# Patient Record
Sex: MALE | Race: WHITE | NOT HISPANIC OR LATINO | Employment: OTHER | URBAN - METROPOLITAN AREA
[De-identification: names, ages, dates, MRNs, and addresses within clinical notes are randomized per-mention and may not be internally consistent; named-entity substitution may affect disease eponyms.]

---

## 2017-02-06 ENCOUNTER — TRANSFERRED RECORDS (OUTPATIENT)
Dept: HEALTH INFORMATION MANAGEMENT | Facility: CLINIC | Age: 52
End: 2017-02-06

## 2017-05-03 DIAGNOSIS — E10.319 TYPE 1 DIABETES MELLITUS WITH RETINOPATHY OF BOTH EYES, MACULAR EDEMA PRESENCE UNSPECIFIED, UNSPECIFIED RETINOPATHY SEVERITY (H): ICD-10-CM

## 2017-05-03 LAB — HBA1C MFR BLD: 5.9 % (ref 4.3–6)

## 2017-05-03 PROCEDURE — 83036 HEMOGLOBIN GLYCOSYLATED A1C: CPT | Performed by: INTERNAL MEDICINE

## 2017-05-03 PROCEDURE — 36415 COLL VENOUS BLD VENIPUNCTURE: CPT | Performed by: INTERNAL MEDICINE

## 2017-05-09 ENCOUNTER — OFFICE VISIT (OUTPATIENT)
Dept: FAMILY MEDICINE | Facility: CLINIC | Age: 52
End: 2017-05-09
Payer: COMMERCIAL

## 2017-05-09 VITALS
SYSTOLIC BLOOD PRESSURE: 123 MMHG | TEMPERATURE: 97.6 F | WEIGHT: 165.8 LBS | DIASTOLIC BLOOD PRESSURE: 62 MMHG | BODY MASS INDEX: 23.74 KG/M2 | HEART RATE: 54 BPM | HEIGHT: 70 IN

## 2017-05-09 DIAGNOSIS — Z11.59 NEED FOR HEPATITIS C SCREENING TEST: ICD-10-CM

## 2017-05-09 DIAGNOSIS — E10.319 TYPE 1 DIABETES MELLITUS WITH RETINOPATHY OF BOTH EYES, MACULAR EDEMA PRESENCE UNSPECIFIED, UNSPECIFIED RETINOPATHY SEVERITY (H): ICD-10-CM

## 2017-05-09 DIAGNOSIS — E78.5 HYPERLIPIDEMIA LDL GOAL <100: Chronic | ICD-10-CM

## 2017-05-09 DIAGNOSIS — E10.3293 TYPE 1 DIABETES MELLITUS WITH MILD NONPROLIFERATIVE RETINOPATHY OF BOTH EYES WITHOUT MACULAR EDEMA (H): Primary | Chronic | ICD-10-CM

## 2017-05-09 PROCEDURE — 99214 OFFICE O/P EST MOD 30 MIN: CPT | Performed by: INTERNAL MEDICINE

## 2017-05-09 RX ORDER — INSULIN GLARGINE 100 [IU]/ML
2-5 INJECTION, SOLUTION SUBCUTANEOUS AT BEDTIME
Qty: 10 ML | Refills: 11 | Status: SHIPPED | OUTPATIENT
Start: 2017-05-09 | End: 2017-11-21

## 2017-05-09 NOTE — PROGRESS NOTES
SUBJECTIVE:                                                    Rangel Singh is a 51 year old male who presents to clinic today for the following health issues:        Diabetes Follow-up    Patient is checking blood sugars: four times daily.    Blood sugar testing frequency justification: Risk of hypoglycemia with medication(s)  Results are as follows:         am - 85              postprandial after breakfast - 110         lunchtime - 130         suppertime - 150         bedtime - 130    Diabetic concerns: None     Symptoms of hypoglycemia (low blood sugar): shaky.  Does have hypoglycemia 2-3 x week unawareness. Wears continuous glucose monitor.  Only has symptoms when blood sugar is < 40.  Often has lows with exercise or activity.  Often unaware.  He does have Glucogon.  Carries glucose tablets everywhere.    He reports he is taking 3 units with meals, sometimes less.  He is using lantus 5 units at bedtime.  He prefers insulin vials over pens.  He wants 1 vial at a time.     Paresthesias (numbness or burning in feet) or sores: No     Date of last diabetic eye exam: up to date    He has  glucagon.  He doesn't wear medical alert bracelet.  His co-workers are aware.    He does have bedtime snack consistently.  Overnight  Hypoglycemia is most bothersome.     Hyperlipidemia Follow-Up      Rate your low fat/cholesterol diet?: good    Taking statin?  Yes, no muscle aches from statin    Other lipid medications/supplements?:  none     Hypertension Follow-up      Outpatient blood pressures are being checked at home.  Results are 105/60.    Low Salt Diet: no added salt         Amount of exercise or physical activity: 2-3 days/week for an average of 45-60 minutes    Problems taking medications regularly: No    Medication side effects: none    Diet: low salt    Chief Complaint   Patient presents with     Diabetes     check     Lab Result Notice     go over           Current Outpatient Prescriptions   Medication  "Sig Dispense Refill     insulin lispro (HUMALOG) 100 UNIT/ML VIAL 3-4 units in am, 3 lunch, 4-8 units in pm and sliding scale.  Estimated maximum units daily is 25 units per day. 3 Month 1     lisinopril (PRINIVIL,ZESTRIL) 10 MG tablet Take 1 tablet (10 mg) by mouth daily 1 tab by mouth once daily 90 tablet 3     atorvastatin (LIPITOR) 10 MG tablet Take 1 tablet (10 mg) by mouth every other day 45 tablet 3     insulin glargine (LANTUS) 100 UNIT/ML vial Inject 7 Units Subcutaneous At Bedtime 30 mL 11     ASPIRIN 81 MG OR TABS 1 tab po QD (Once per day) 100 3     insulin syringes, disposable, U-100 0.3 ML MISC 1 Device 4 times daily 100 each 11     RELION ULTRA THIN LANCETS 30G MISC 1 Device 4 times daily 200 each 11     blood glucose monitoring (NO BRAND SPECIFIED) test strip Check BS 4-5x/day.  Dispense 3 month supply 100 each 3     order for DME Equipment being ordered: Continue glucose monitor - Dexcom G4 - , transmitter, sensor 1 each prn     tadalafil (CIALIS) 20 MG tablet Take 1 tablet (20 mg) by mouth daily as needed for erectile dysfunction Never use with nitroglycerin, terazosin or doxazosin. 3 tablet 3     insulin glargine (LANTUS VIAL) 100 UNIT/ML soln 3 units at dinner 1 Month 11     insulin pen needle (ULTICARE SHORT) 31G X 8 MM Use 3-4 daily or as directed. 100 each prn     insulin pen needle 31G X 6 MM Use 1 pen needles daily or as directed. 100 each 6       Reviewed and updated as needed this visit by clinical staff       Reviewed and updated as needed this visit by Provider         ROS:  Constitutional, HEENT, cardiovascular, pulmonary, gi and gu systems are negative, except as otherwise noted.    OBJECTIVE:                                                    /62 (BP Location: Right arm, Patient Position: Chair, Cuff Size: Adult Regular)  Pulse 54  Temp 97.6  F (36.4  C) (Tympanic)  Ht 5' 10.25\" (1.784 m)  Wt 165 lb 12.8 oz (75.2 kg)  BMI 23.62 kg/m2  Body mass index is 23.62 " kg/(m^2).  GENERAL APPEARANCE: healthy, alert and no distress    Diagnostic Test Results:  Results for orders placed or performed in visit on 05/03/17   Hemoglobin A1c   Result Value Ref Range    Hemoglobin A1C 5.9 4.3 - 6.0 %        ASSESSMENT/PLAN:                                                      1. 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 He also feels symptoms when his blood sugars greater than 150. Since he has hypoglycemia unawareness, he would prefer this over feeling crummy blood sugars of 150. 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 Discussed caryring glucagon. He does carry applesauce pouches. Will have patient decrease his insulin by 1-2 units at meals and with the Lantus. He is on minimal amounts of insulin. Recheck in 6 months, nonfasting blood work prior  -  insulin glargine (LANTUS) 100 UNIT/ML injection; Inject 2-5 Units Subcutaneous At Bedtime  Dispense: 10 mL; Refill: 11  - insulin lispro (HUMALOG) 100 UNIT/ML injection; 2-3 units in am, 2-3 lunch, 2-4 units in pm and sliding scale.  Estimated maximum units daily is 25 units per day.  Dispense: 1 vial; Refill: 11  - glucagon (GLUCAGON EMERGENCY) 1 MG kit; Inject 1 mg into the muscle once for 1 dose  Dispense: 1 mg; Refill: 11  - Hemoglobin A1c; Future  - LDL cholesterol direct; Future  - Basic metabolic panel; Future  - **Albumin Random Urine Quant FUTURE 6mo; Future    2. 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 - see above  - insulin glargine (LANTUS) 100 UNIT/ML injection; Inject 2-5 Units Subcutaneous At Bedtime  Dispense: 10 mL; Refill: 11  - insulin lispro (HUMALOG) 100 UNIT/ML injection; 2-3 units in am, 2-3 lunch, 2-4 units in pm and sliding scale.  Estimated maximum units daily is 25 units  per day.  Dispense: 1 vial; Refill: 11  - glucagon (GLUCAGON EMERGENCY) 1 MG kit; Inject 1 mg into the muscle once for 1 dose  Dispense: 1 mg; Refill: 11    3. Hyperlipidemia LDL goal <100 - patient on low intensity statin due to fairly low cholesterol numbers off statin    4. Need for hepatitis C screening test  - Hepatitis C antibody; Future    1. Consider wearing your medical alert ID all the time  2. Consider filling a few Glucagon, having kit at work, in lunchbox, at home, etc.  3. Decrease insulin by 1-2 units.  4. See Dr. Ji in 6 months, sooner if needed.  Get non-fasting blood work prior  5. It is recommend that people born between 1945 and 1965 be screened one time for Hepatitis C with a blood test. Order was placed.      Ana Laura Ji, DO  [facility]

## 2017-05-09 NOTE — PATIENT INSTRUCTIONS
1. Consider wearing your medical alert ID all the time  2. Consider filling a few Glucagon, having kit at work, in lunchbox, at home, etc.  3. Decrease insulin by 1-2 units.  4. See Dr. Ji in 6 months, sooner if needed.  Get non-fasting blood work prior  5. It is recommend that people born between 1945 and 1965 be screened one time for Hepatitis C with a blood test. Order was placed.

## 2017-05-09 NOTE — NURSING NOTE
"Chief Complaint   Patient presents with     Diabetes     check     Lab Result Notice     go over       Initial /62 (BP Location: Right arm, Patient Position: Chair, Cuff Size: Adult Regular)  Pulse 54  Temp 97.6  F (36.4  C) (Tympanic)  Ht 5' 10.25\" (1.784 m)  Wt 165 lb 12.8 oz (75.2 kg)  BMI 23.62 kg/m2 Estimated body mass index is 23.62 kg/(m^2) as calculated from the following:    Height as of this encounter: 5' 10.25\" (1.784 m).    Weight as of this encounter: 165 lb 12.8 oz (75.2 kg).  Medication Reconciliation: complete  "

## 2017-05-09 NOTE — MR AVS SNAPSHOT
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 Consider wearing your medical alert ID all the time  2. Consider filling a few Glucagon, having kit at work, in lunchbox, at home, etc.  3. Decrease insulin by 1-2 units.  4. See Dr. Ji in 6 months, sooner if needed.  Get non-fasting blood work prior  5. It is recommend that people born between 1945 and 1965 be screened one time for Hepatitis C with a blood test. Order was placed.          Follow-ups after your visit        Future tests that were ordered for you today     Open Future Orders        Priority Expected Expires Ordered    Hepatitis C antibody Routine  5/9/2018 5/9/2017    Hemoglobin A1c Routine 11/7/2017 2/7/2018 5/9/2017    LDL cholesterol direct Routine  5/9/2018 5/9/2017    Basic metabolic panel Routine 11/7/2017 2/7/2018 5/9/2017    **Albumin Random Urine Quant FUTURE 6mo Routine 11/5/2017 12/5/2017 5/9/2017            Who to contact     If you have questions or need follow up information about today's clinic visit or your schedule please contact [facility] directly at [phone].  Normal or non-critical lab and imaging results will be communicated to you by MyChart, letter or phone within 4 business days after the clinic has received the results. If you do not hear from us within 7 days, please contact the clinic through [employer] or  "phone. If you have a critical or abnormal lab result, we will notify you by phone as soon as possible.  Submit refill requests through "[employer]" or call your pharmacy and they will forward the refill request to us. Please allow 3 business days for your refill to be completed.          Additional Information About Your Visit        MOD[employer]art Information     "[employer] gives you secure access to your electronic health record. If you see a primary care provider, you can also send messages to your care team and make appointments. If you have questions, please call your primary care clinic.  If you do not have a primary care provider, please call [phone] and they will assist you.        Care EveryWhere ID     This is your Care EveryWhere ID. This could be used by other organizations to access your [place] medical records  [id number]        Your Vitals Were     Pulse Temperature Height BMI (Body Mass Index)          54 97.6  F (36.4  C) (Tympanic) 5' 10.25\" (1.784 m) 23.62 kg/m2         Blood Pressure from Last 3 Encounters:   05/09/17 123/62   11/10/16 122/67   11/01/16 123/70    Weight from Last 3 Encounters:   05/09/17 165 lb 12.8 oz (75.2 kg)   11/10/16 162 lb (73.5 kg)   11/01/16 162 lb (73.5 kg)                 Today's Medication Changes          These changes are accurate as of: 5/9/17  3:54 PM.  If you have any questions, ask your nurse or doctor.               Start taking these medicines.        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 "        Dose/Directions    insulin glargine 100 UNIT/ML injection   Commonly known as:  LANTUS   This may have changed:    - how much to take  - Another medication with the same name was removed. Continue taking this medication, and follow the directions you see here.   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  Estimated maximum units daily is 25 units per day.   Quantity:  1 vial   Refills:  11            Where to get your medicines      These medications were sent to [facility] [address] [place] [address]  [address] [facility] [address]     Phone:  [phone]     glucagon 1 MG kit    insulin glargine 100 UNIT/ML injection    insulin lispro 100 UNIT/ML injection                Primary Care Provider Office Phone # Fax #    Ana Laura Ji [phone]       [facility] [address] [facility] [address]        Thank you!     Thank you for choosing [facility]  for your care. Our goal is always to provide you with excellent care. Hearing back from our patients is one way we can continue to improve our services. Please take a few  minutes to complete the written survey that you may receive in the mail after your visit with us. Thank you!             Your Updated Medication List - Protect others around you: Learn how to safely use, store and throw away your medicines at www.disposemymeds.org.          This list is accurate as of: 5/9/17  3:54 PM.  Always use your most recent med list.                   Brand Name Dispense Instructions for use    aspirin 81 MG tablet     100    1 tab po QD (Once per day)       atorvastatin 10 MG tablet    LIPITOR    45 tablet    Take 1 tablet (10 mg) by mouth every other day       blood glucose monitoring test strip    no brand specified    100 each    Check BS 4-5x/day.  Dispense 3 month supply       glucagon 1 MG kit    GLUCAGON EMERGENCY    1 mg    Inject 1 mg into the muscle once for 1 dose       insulin glargine 100 UNIT/ML injection    LANTUS    10 mL    Inject 2-5 Units Subcutaneous At Bedtime       insulin lispro 100 UNIT/ML injection    humaLOG    1 vial    2-3 units in am, 2-3 lunch, 2-4 units in pm and sliding scale.  Estimated maximum units daily is 25 units per day.       * insulin pen needle 31G X 6 MM     100 each    Use 1 pen needles daily or as directed.       * insulin pen needle 31G X 8 MM    ULTICARE SHORT    100 each    Use 3-4 daily or as directed.       insulin syringes (disposable) U-100 0.3 ML Misc     100 each    1 Device 4 times daily       lisinopril 10 MG tablet    PRINIVIL/ZESTRIL    90 tablet    Take 1 tablet (10 mg) by mouth daily 1 tab by mouth once daily       order for DME     1 each    Equipment being ordered: Continue glucose monitor - Dexcom G4 - , transmitter, sensor       RELION ULTRA THIN LANCETS 30G Misc     200 each    1 Device 4 times daily       tadalafil 20 MG tablet    CIALIS    3 tablet    Take 1 tablet (20 mg) by mouth daily as needed for erectile dysfunction Never use with nitroglycerin, terazosin or doxazosin.       * Notice:  This list has 2  medication(s) that are the same as other medications prescribed for you. Read the directions carefully, and ask your doctor or other care provider to review them with you.

## 2017-11-18 ENCOUNTER — TELEPHONE (OUTPATIENT)
Dept: FAMILY MEDICINE | Facility: CLINIC | Age: 52
End: 2017-11-18

## 2017-11-18 DIAGNOSIS — E10.3293 TYPE 1 DIABETES MELLITUS WITH MILD NONPROLIFERATIVE RETINOPATHY OF BOTH EYES WITHOUT MACULAR EDEMA (H): Chronic | ICD-10-CM

## 2017-11-18 DIAGNOSIS — Z11.59 NEED FOR HEPATITIS C SCREENING TEST: ICD-10-CM

## 2017-11-18 DIAGNOSIS — E78.5 HYPERLIPIDEMIA LDL GOAL <100: Primary | Chronic | ICD-10-CM

## 2017-11-18 DIAGNOSIS — E78.5 HYPERLIPIDEMIA LDL GOAL <100: Chronic | ICD-10-CM

## 2017-11-18 LAB
ANION GAP SERPL CALCULATED.3IONS-SCNC: 4 MMOL/L (ref 3–14)
BUN SERPL-MCNC: 25 MG/DL (ref 7–30)
CALCIUM SERPL-MCNC: 8.6 MG/DL (ref 8.5–10.1)
CHLORIDE SERPL-SCNC: 107 MMOL/L (ref 94–109)
CO2 SERPL-SCNC: 29 MMOL/L (ref 20–32)
CREAT SERPL-MCNC: 0.76 MG/DL (ref 0.66–1.25)
CREAT UR-MCNC: 154 MG/DL
GFR SERPL CREATININE-BSD FRML MDRD: >90 ML/MIN/1.7M2
GLUCOSE SERPL-MCNC: 106 MG/DL (ref 70–99)
HBA1C MFR BLD: 6.3 % (ref 4.3–6)
LDLC SERPL DIRECT ASSAY-MCNC: 96 MG/DL
MICROALBUMIN UR-MCNC: 17 MG/L
MICROALBUMIN/CREAT UR: 10.78 MG/G CR (ref 0–17)
POTASSIUM SERPL-SCNC: 5.1 MMOL/L (ref 3.4–5.3)
SODIUM SERPL-SCNC: 140 MMOL/L (ref 133–144)

## 2017-11-18 PROCEDURE — 83721 ASSAY OF BLOOD LIPOPROTEIN: CPT | Performed by: INTERNAL MEDICINE

## 2017-11-18 PROCEDURE — 36415 COLL VENOUS BLD VENIPUNCTURE: CPT | Performed by: INTERNAL MEDICINE

## 2017-11-18 PROCEDURE — 80048 BASIC METABOLIC PNL TOTAL CA: CPT | Performed by: INTERNAL MEDICINE

## 2017-11-18 PROCEDURE — 82043 UR ALBUMIN QUANTITATIVE: CPT | Performed by: INTERNAL MEDICINE

## 2017-11-18 PROCEDURE — 86803 HEPATITIS C AB TEST: CPT | Performed by: INTERNAL MEDICINE

## 2017-11-18 PROCEDURE — 83036 HEMOGLOBIN GLYCOSYLATED A1C: CPT | Performed by: INTERNAL MEDICINE

## 2017-11-18 PROCEDURE — 80061 LIPID PANEL: CPT | Performed by: INTERNAL MEDICINE

## 2017-11-18 NOTE — TELEPHONE ENCOUNTER
Rangel would like the whole BLIPR done with his labs today. Not just the LDLDIR.   Please place order for him in [facility]. Thanks  Hellen

## 2017-11-20 LAB
CHOLEST SERPL-MCNC: 146 MG/DL
HCV AB SERPL QL IA: NONREACTIVE
HDLC SERPL-MCNC: 60 MG/DL
LDLC SERPL CALC-MCNC: 80 MG/DL
NONHDLC SERPL-MCNC: 86 MG/DL
TRIGL SERPL-MCNC: 29 MG/DL

## 2017-11-20 NOTE — TELEPHONE ENCOUNTER
Pt is asking for lipid panel instead of LDL only.  I have cued this up since pt was already drawn and processed on 11/18/17.  I called the lab and lipid panel can still be processed.    Order placed per protocol.  Vibha Jaquez RN    Recent Labs   Lab Test  11/18/17   0657  10/22/16   0723  04/22/16   0811  10/24/15   0721  10/11/14   0716   CHOL   --   156  234*  150  153   HDL   --   60  72  58  62   LDL  96  91  154*  86  87   TRIG   --   24  38  29  22   CHOLHDLRATIO   --    --    --   2.6  2.5

## 2017-11-21 ENCOUNTER — OFFICE VISIT (OUTPATIENT)
Dept: FAMILY MEDICINE | Facility: CLINIC | Age: 52
End: 2017-11-21
Payer: COMMERCIAL

## 2017-11-21 VITALS
HEIGHT: 70 IN | DIASTOLIC BLOOD PRESSURE: 66 MMHG | TEMPERATURE: 97.6 F | SYSTOLIC BLOOD PRESSURE: 103 MMHG | HEART RATE: 64 BPM | WEIGHT: 161.6 LBS | BODY MASS INDEX: 23.13 KG/M2

## 2017-11-21 DIAGNOSIS — I10 ESSENTIAL HYPERTENSION: ICD-10-CM

## 2017-11-21 DIAGNOSIS — E78.5 HYPERLIPIDEMIA LDL GOAL <100: Chronic | ICD-10-CM

## 2017-11-21 DIAGNOSIS — Z00.00 ENCOUNTER FOR GENERAL ADULT MEDICAL EXAMINATION WITHOUT ABNORMAL FINDINGS: Primary | ICD-10-CM

## 2017-11-21 DIAGNOSIS — E10.319 TYPE 1 DIABETES MELLITUS WITH RETINOPATHY OF BOTH EYES, MACULAR EDEMA PRESENCE UNSPECIFIED, UNSPECIFIED RETINOPATHY SEVERITY (H): ICD-10-CM

## 2017-11-21 PROBLEM — E10.649 TYPE 1 DIABETES MELLITUS WITH HYPOGLYCEMIA AND WITHOUT COMA (H): Status: ACTIVE | Noted: 2017-11-21

## 2017-11-21 PROCEDURE — 99396 PREV VISIT EST AGE 40-64: CPT | Performed by: INTERNAL MEDICINE

## 2017-11-21 RX ORDER — INSULIN GLARGINE 100 [IU]/ML
2-5 INJECTION, SOLUTION SUBCUTANEOUS AT BEDTIME
Qty: 10 ML | Refills: 11 | Status: SHIPPED | OUTPATIENT
Start: 2017-11-21 | End: 2018-12-22

## 2017-11-21 RX ORDER — ATORVASTATIN CALCIUM 10 MG/1
10 TABLET, FILM COATED ORAL EVERY OTHER DAY
Qty: 45 TABLET | Refills: 3 | Status: SHIPPED | OUTPATIENT
Start: 2017-11-21 | End: 2019-01-08

## 2017-11-21 RX ORDER — LISINOPRIL 10 MG/1
10 TABLET ORAL DAILY
Qty: 90 TABLET | Refills: 3 | Status: SHIPPED | OUTPATIENT
Start: 2017-11-21 | End: 2019-01-08

## 2017-11-21 NOTE — PROGRESS NOTES
SUBJECTIVE:   CC: Rangel Singh is an 52 year old male who presents for preventative health visit.   Chief Complaint   Patient presents with     Physical     follow up,      Diabetes     follow up diabetes/cholesterol , renew meds- orders pended, due for foot exam      Health Maintenance     Declined Flu shot        Healthy Habits:    Do you get at least three servings of calcium containing foods daily (dairy, green leafy vegetables, etc.)? yes    Amount of exercise or daily activities, outside of work: 6 day(s) per week    Problems taking medications regularly No    Medication side effects: No    Have you had an eye exam in the past two years? yes    Do you see a dentist twice per year? Yes    Do you have sleep apnea, excessive snoring or daytime drowsiness?yes, daytime drowsiness    Diabetes Follow-up    Patient is checking blood sugars: four times daily.    Blood sugar testing frequency justification: Risk of hypoglycemia with medication(s)  Results are as follows:       am -        lunchtime - 70-80            postprandial after supper- 120       bedtime - 100     Diabetic concerns: other - one month ago was 150- over 200 for 2 weeks. Better now      Symptoms of hypoglycemia (low blood sugar): none     Paresthesias (numbness or burning in feet) or sores: Yes Numbness  ( had this in June) seen Ortho for this, has inserts, they do help, but if he does run his big toes get numb.  Worsened with excessive running.  Ortho thought more mechanical than diabetes neuropathy.     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  Occurs 5-6 x in last 6 months.  He has glucagon. He doesn't wear medical alert bracelet.  His co-workers are aware    He does have bedtime snack consistently    Humalog 2-3 in AM and Lantus 4 units HS.  Not running, so using higher amounts.     He prefers vials over insulin pens    He has DEXCOM due to hypoglycemia unawareness    Hyperlipidemia  Follow-Up      Rate your low fat/cholesterol diet?: good    Taking statin?  Yes, muscle aches after starting statin    Other lipid medications/supplements?:  none    Hypertension Follow-up      Outpatient blood pressures are being checked at home.  Results are 100/70-75.    Low Salt Diet: not monitoring salt    Episode of shortness of breath:  While completing half marathon, felt 'gurgling' in throat, like he had to cough, but nothing would come out.  Almost sought medical attention during race, but was able to complete.  He has since completed multiple other long runs w/out similar symptoms.    Urinary Frequency:  Occurred 2-3 weeks ago.  Was urinating every 1-2 hours, large amounts.  Starting lifting weights and symptoms since resolved.  No dysuria, hematuria.  Occurred around time when he had hyperglycemia in 150s.      Today's PHQ-2 Score:   PHQ-2 ( 1999 Pfizer) 11/21/2017 5/9/2017   Q1: Little interest or pleasure in doing things 0 0   Q2: Feeling down, depressed or hopeless 0 0   PHQ-2 Score 0 0       Abuse: Current or Past(Physical, Sexual or Emotional)- No  Do you feel safe in your environment - Yes    Social History   Substance Use Topics     Smoking status: Never Smoker     Smokeless tobacco: Never Used     Alcohol use Yes      Comment: occ. 2-3 times a month     The patient does not drink >3 drinks per day nor >7 drinks per week.    Last PSA: No results found for: PSA    Reviewed orders with patient. Reviewed health maintenance and updated orders accordingly - Yes  Current Outpatient Prescriptions   Medication Sig Dispense Refill     insulin glargine (LANTUS) 100 UNIT/ML injection Inject 2-5 Units Subcutaneous At Bedtime 10 mL 11     insulin lispro (HUMALOG) 100 UNIT/ML injection 2-3 units in am, 2-3 lunch, 2-4 units in pm and sliding scale.  Estimated maximum units daily is 25 units per day. 1 vial 11     insulin syringes, disposable, U-100 0.3 ML MISC 1 Device 4 times daily 100 each 11     RELION ULTRA  "THIN LANCETS 30G MISC 1 Device 4 times daily 200 each 11     blood glucose monitoring (NO BRAND SPECIFIED) test strip Check BS 4-5x/day.  Dispense 3 month supply 100 each 3     lisinopril (PRINIVIL,ZESTRIL) 10 MG tablet Take 1 tablet (10 mg) by mouth daily 1 tab by mouth once daily 90 tablet 3     atorvastatin (LIPITOR) 10 MG tablet Take 1 tablet (10 mg) by mouth every other day 45 tablet 3     order for DME Equipment being ordered: Continue glucose monitor - Dexcom G4 - , transmitter, sensor 1 each prn     ASPIRIN 81 MG OR TABS 1 tab po QD (Once per day) 100 3     glucagon (GLUCAGON EMERGENCY) 1 MG kit Inject 1 mg into the muscle once for 1 dose 1 mg 11       Reviewed and updated as needed this visit by clinical staff  Tobacco  Allergies  Meds  Problems  Med Hx  Surg Hx  Fam Hx  Soc Hx          Reviewed and updated as needed this visit by Provider  Allergies  Meds  Problems          ROS: negative except as noted in HPI  C: NEGATIVE for fever, chills, change in weight  I: NEGATIVE for worrisome rashes, moles or lesions  E: NEGATIVE for vision changes or irritation  ENT: NEGATIVE for ear, mouth and throat problems  R: NEGATIVE for significant cough or SOB  CV: NEGATIVE for chest pain, palpitations or peripheral edema  GI: NEGATIVE for nausea, abdominal pain, heartburn, or change in bowel habits   male: negative for dysuria, hematuria, decreased urinary stream, erectile dysfunction, urethral discharge  M: NEGATIVE for significant arthralgias or myalgia  N: NEGATIVE for weakness, dizziness or paresthesias  P: NEGATIVE for changes in mood or affect    OBJECTIVE:   /66 (BP Location: Left arm, Patient Position: Chair, Cuff Size: Adult Regular)  Pulse 64  Temp 97.6  F (36.4  C) (Tympanic)  Ht 5' 10.25\" (1.784 m)  Wt 161 lb 9.6 oz (73.3 kg)  BMI 23.02 kg/m2  EXAM:  GENERAL: healthy, alert and no distress  EYES: Eyes grossly normal to inspection, PERRL and conjunctivae and sclerae normal  HENT: " "ear canals and TM's normal, nose and mouth without ulcers or lesions  NECK: no adenopathy, no asymmetry, masses, or scars and thyroid normal to palpation  RESP: lungs clear to auscultation - no rales, rhonchi or wheezes  CV: regular rate and rhythm, normal S1 S2, no S3 or S4, no murmur, click or rub, no peripheral edema and peripheral pulses strong  ABDOMEN: soft, nontender, no hepatosplenomegaly, no masses and bowel sounds normal  MS: no gross musculoskeletal defects noted, no edema  SKIN: no suspicious lesions or rashes  NEURO: Normal strength and tone, mentation intact and speech normal  PSYCH: mentation appears normal, affect normal/bright  Diabetic foot exam: normal DP and PT pulses, no trophic changes or ulcerative lesions and normal sensory exam    ASSESSMENT/PLAN:       ICD-10-CM    1. Encounter for general adult medical examination without abnormal findings Z00.00    2. 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 Hyperlipidemia LDL goal <100 E78.5 atorvastatin (LIPITOR) 10 MG tablet   4. Essential hypertension I10        COUNSELING:  Reviewed preventive health counseling, as reflected in patient instructions           reports that he has never smoked. He has never used smokeless tobacco.      Estimated body mass index is 23.02 kg/(m^2) as calculated from the following:    Height as of this encounter: 5' 10.25\" (1.784 m).    Weight as of this encounter: 161 lb 9.6 oz (73.3 kg).         Counseling Resources:  ATP IV Guidelines  Pooled Cohorts Equation " Calculator  FRAX Risk Assessment  ICSI Preventive Guidelines  Dietary Guidelines for Americans, 2010  USDA's MyPlate  ASA Prophylaxis  Lung CA Screening    Ana Laura Ji DO  [facility]

## 2017-11-21 NOTE — PATIENT INSTRUCTIONS
Thank you for choosing [facility].  You may be receiving a survey in the mail from Beck Cota regarding your visit today.  Please take a few minutes to complete and return the survey to let us know how we are doing.      If you have questions or concerns, please contact us via [employer] or you can contact your care team at [phone].    Our Clinic hours are:  Monday 6:40 am  to 7:00 pm  Tuesday -Friday 6:40 am to 5:00 pm    The [place] outpatient lab hours are:  Monday - Friday 6:10 am to 4:45 pm  Saturdays 7:00 am to 11:00 am  Appointments are required, call [phone]    If you have clinical questions after hours or would like to schedule an appointment,  call the clinic at [phone].    Preventive Health Recommendations  Male Ages 50   64    Yearly exam:             See your health care provider every year in order to  o   Review health changes.   o   Discuss preventive care.    o   Review your medicines if your doctor has prescribed any.     Have a cholesterol test every 5 years, or more frequently if you are at risk for high cholesterol/heart disease.     Have a diabetes test (fasting glucose) every three years. If you are at risk for diabetes, you should have this test more often.     Have a colonoscopy at age 50, or have a yearly FIT test (stool test). These exams will check for colon cancer.      Talk with your health care provider about whether or not a prostate cancer screening test (PSA) is right for you.    You should be tested each year for STDs (sexually transmitted diseases), if you re at risk.     Shots: Get a flu shot each year. Get a tetanus shot every 10 years.     Nutrition:    Eat at least 5 servings of fruits and vegetables daily.     Eat whole-grain bread, whole-wheat pasta and brown rice instead of white grains and rice.     Talk to your provider about Calcium and Vitamin D.     Lifestyle    Exercise for at least 150 minutes a week (30 minutes a day, 5 days a week). This  will help you control your weight and prevent disease.     Limit alcohol to one drink per day.     No smoking.     Wear sunscreen to prevent skin cancer.     See your dentist every six months for an exam and cleaning.     See your eye doctor every 1 to 2 years.

## 2017-11-21 NOTE — MR AVS SNAPSHOT
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  You may be receiving a survey in the mail from Bcek Cota regarding your visit today.  Please take a few minutes to complete and return the survey to let us know how we are doing.      If you have questions or concerns, please contact us via [employer] or you can contact your care team at [phone].    Our Clinic hours are:  Monday 6:40 am  to 7:00 pm  Tuesday -Friday 6:40 am to 5:00 pm    The [place] outpatient lab hours are:  Monday - Friday 6:10 am to 4:45 pm  Saturdays 7:00 am to 11:00 am  Appointments are required, call [phone]    If you have clinical questions after hours or would like to schedule an appointment,  call the clinic at [phone].    Preventive Health Recommendations  Male Ages 50 - 64    Yearly exam:             See your health care provider every year in order to  o   Review health changes.   o   Discuss preventive care.    o   Review your medicines if your doctor has prescribed any.     Have a cholesterol test every 5 years, or more frequently if you are at risk for high cholesterol/heart disease.     Have a diabetes test (fasting glucose) every three years. If you are at risk for diabetes, you should have this test more often.     Have a colonoscopy at age 50, or have a yearly FIT test (stool test). These exams will check for  colon cancer.      Talk with your health care provider about whether or not a prostate cancer screening test (PSA) is right for you.    You should be tested each year for STDs (sexually transmitted diseases), if you re at risk.     Shots: Get a flu shot each year. Get a tetanus shot every 10 years.     Nutrition:    Eat at least 5 servings of fruits and vegetables daily.     Eat whole-grain bread, whole-wheat pasta and brown rice instead of white grains and rice.     Talk to your provider about Calcium and Vitamin D.     Lifestyle    Exercise for at least 150 minutes a week (30 minutes a day, 5 days a week). This will help you control your weight and prevent disease.     Limit alcohol to one drink per day.     No smoking.     Wear sunscreen to prevent skin cancer.     See your dentist every six months for an exam and cleaning.     See your eye doctor every 1 to 2 years.            Follow-ups after your visit        Future tests that were ordered for you today     Open Future Orders        Priority Expected Expires Ordered    Hemoglobin A1c Routine 5/22/2018 8/22/2018 11/21/2017            Who to contact     If you have questions or need follow up information about today's clinic visit or your schedule please contact [facility] directly at [phone].  Normal or non-critical lab and imaging results will be communicated to you by MyChart, letter or phone within 4 business days after the clinic has received the results. If you do not hear from us within 7 days, please contact the clinic through [employer] or phone. If you have a critical or abnormal lab result, we will notify you by phone as soon as possible.  Submit refill requests through [employer] or call your pharmacy and they will forward the refill request to us. Please allow 3 business days for your refill to be completed.          Additional Information About Your Visit        Mobile Pulse Information     [employer] gives you secure access to your electronic  "health record. If you see a primary care provider, you can also send messages to your care team and make appointments. If you have questions, please call your primary care clinic.  If you do not have a primary care provider, please call [phone] and they will assist you.        Care EveryWhere ID     This is your Care EveryWhere ID. This could be used by other organizations to access your [place] medical records  [id number]        Your Vitals Were     Pulse Temperature Height BMI (Body Mass Index)          64 97.6  F (36.4  C) (Tympanic) 5' 10.25\" (1.784 m) 23.02 kg/m2         Blood Pressure from Last 3 Encounters:   11/21/17 103/66   05/09/17 123/62   11/10/16 122/67    Weight from Last 3 Encounters:   11/21/17 161 lb 9.6 oz (73.3 kg)   05/09/17 165 lb 12.8 oz (75.2 kg)   11/10/16 162 lb (73.5 kg)                 Today's Medication Changes          These changes are accurate as of: 11/21/17  8:10 AM.  If you have any questions, ask your nurse or doctor.               These medicines have changed or have updated prescriptions.        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   Changed by:  Ana Laura Ji DO        Dose:  10 mg   Take 1 tablet (10 mg) by mouth daily   Quantity:  90 tablet   Refills:  3            Where to get your medicines      These medications were sent to [facility] [address] [place] [address]  [address] [facility] [address]     Phone:  [phone]     atorvastatin 10 MG tablet    blood glucose monitoring test strip    glucagon 1 MG kit    insulin glargine 100 UNIT/ML injection    insulin lispro 100 UNIT/ML injection    insulin syringes (disposable) U-100 0.3 ML Misc    lisinopril 10 MG tablet                Primary " Care Provider Office Phone # Fax #    Ana Laura Ji [phone] [address] [facility] [address]        Equal Access to Services     LITZY KERNS : Hadii aad ku hadmarianao Soyonathanali, waaxda luqadaha, qaybta kaalmada adeegbellda, cyndie meek laOliviagabby keys. So [facility] [phone].    ATENCIÓN: Si habla español, tiene a rosario disposición servicios gratuitos de asistencia lingüística. Llame al [phone].    We comply with applicable federal civil rights laws and [place] laws. We do not discriminate on the basis of race, color, national origin, age, disability, sex, sexual orientation, or gender identity.            Thank you!     Thank you for choosing [facility]  for your care. Our goal is always to provide you with excellent care. Hearing back from our patients is one way we can continue to improve our services. Please take a few minutes to complete the written survey that you may receive in the mail after your visit with us. Thank you!             Your Updated Medication List - Protect others around you: Learn how to safely use, store and throw away your medicines at www.disposemymeds.org.          This list is accurate as of: 11/21/17  8:10 AM.  Always use your most recent med list.                   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  Estimated maximum units daily is 25 units per day.    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

## 2017-11-21 NOTE — NURSING NOTE
"Chief Complaint   Patient presents with     Physical     follow up,      Diabetes     follow up diabetes/cholesterol , renew meds- orders pended, due for foot exam      Health Maintenance     Declined Flu shot        Initial /66 (BP Location: Left arm, Patient Position: Chair, Cuff Size: Adult Regular)  Pulse 64  Temp 97.6  F (36.4  C) (Tympanic)  Ht 5' 10.25\" (1.784 m)  Wt 161 lb 9.6 oz (73.3 kg)  BMI 23.02 kg/m2 Estimated body mass index is 23.02 kg/(m^2) as calculated from the following:    Height as of this encounter: 5' 10.25\" (1.784 m).    Weight as of this encounter: 161 lb 9.6 oz (73.3 kg).  Medication Reconciliation: complete    "

## 2017-12-18 DIAGNOSIS — E10.319 TYPE 1 DIABETES MELLITUS WITH RETINOPATHY OF BOTH EYES, MACULAR EDEMA PRESENCE UNSPECIFIED, UNSPECIFIED RETINOPATHY SEVERITY (H): ICD-10-CM

## 2017-12-27 RX ORDER — GLUCOSAM/CHON-MSM1/C/MANG/BOSW 500-416.6
TABLET ORAL
Qty: 300 EACH | Refills: 3 | Status: SHIPPED | OUTPATIENT
Start: 2017-12-27 | End: 2018-12-28

## 2018-01-25 DIAGNOSIS — E78.5 HYPERLIPIDEMIA LDL GOAL <100: Chronic | ICD-10-CM

## 2018-01-25 DIAGNOSIS — E10.319 TYPE 1 DIABETES MELLITUS WITH RETINOPATHY OF BOTH EYES, MACULAR EDEMA PRESENCE UNSPECIFIED, UNSPECIFIED RETINOPATHY SEVERITY (H): ICD-10-CM

## 2018-01-26 NOTE — TELEPHONE ENCOUNTER
"Requested Prescriptions   Pending Prescriptions Disp Refills     atorvastatin (LIPITOR) 10 MG tablet [Pharmacy Med Name: ATORVASTATIN 10MG   TAB]  Last Written Prescription Date:  11/21/2017  Last Fill Quantity: 45,  # refills: 3   Last Office Visit with [facility] provider:  11/21/2017   Future Office Visit:      45 tablet 3     Sig: TAKE ONE TABLET BY MOUTH EVERY OTHER DAY    Statins Protocol Passed    1/25/2018  3:58 PM       Passed - LDL on file in past 12 months    Recent Labs   Lab Test  11/18/17   0657   LDL  80  96            Passed - No abnormal creatine kinase in past 12 months    No lab results found.         Passed - Recent or future visit with authorizing provider    Patient had office visit in the last year or has a visit in the next 30 days with authorizing provider.  See \"Patient Info\" tab in inbasket, or \"Choose Columns\" in Meds & Orders section of the refill encounter.            Passed - Patient is age 18 or older        Santos SAGASTUME (R)    "

## 2018-01-31 RX ORDER — ATORVASTATIN CALCIUM 10 MG/1
TABLET, FILM COATED ORAL
Qty: 45 TABLET | Refills: 3 | OUTPATIENT
Start: 2018-01-31

## 2018-02-26 ENCOUNTER — OFFICE VISIT (OUTPATIENT)
Dept: FAMILY MEDICINE | Facility: CLINIC | Age: 53
End: 2018-02-26
Payer: COMMERCIAL

## 2018-02-26 VITALS
WEIGHT: 169 LBS | OXYGEN SATURATION: 99 % | SYSTOLIC BLOOD PRESSURE: 112 MMHG | DIASTOLIC BLOOD PRESSURE: 68 MMHG | BODY MASS INDEX: 24.08 KG/M2 | HEART RATE: 63 BPM | TEMPERATURE: 98.2 F

## 2018-02-26 DIAGNOSIS — J06.9 UPPER RESPIRATORY TRACT INFECTION, UNSPECIFIED TYPE: ICD-10-CM

## 2018-02-26 DIAGNOSIS — R68.89 FLU-LIKE SYMPTOMS: Primary | ICD-10-CM

## 2018-02-26 DIAGNOSIS — E10.3293 TYPE 1 DIABETES MELLITUS WITH MILD NONPROLIFERATIVE RETINOPATHY OF BOTH EYES WITHOUT MACULAR EDEMA (H): Chronic | ICD-10-CM

## 2018-02-26 LAB
FLUAV+FLUBV AG SPEC QL: NEGATIVE
FLUAV+FLUBV AG SPEC QL: NEGATIVE
SPECIMEN SOURCE: NORMAL

## 2018-02-26 PROCEDURE — 99214 OFFICE O/P EST MOD 30 MIN: CPT | Performed by: NURSE PRACTITIONER

## 2018-02-26 PROCEDURE — 87804 INFLUENZA ASSAY W/OPTIC: CPT | Performed by: NURSE PRACTITIONER

## 2018-02-26 NOTE — PROGRESS NOTES
SUBJECTIVE:   Rangel Singh is a 52 year old male who presents to clinic today for the following health issues:      ENT Symptoms             Symptoms: cc Present Absent Comment   Fever/Chills  x  chills   Fatigue  x     Muscle Aches  x     Eye Irritation   x    Sneezing   x    Nasal Calvin/Drg   x    Sinus Pressure/Pain   x    Loss of smell   x    Dental pain   x    Sore Throat   x    Swollen Glands   x    Ear Pain/Fullness   x    Cough  x     Wheeze  x     Chest Pain   x    Shortness of breath  x     Rash   x    Other   x      Symptom duration:  3   Symptom severity:  moderate   Treatments tried:  none   Contacts:  Co workers     Patient with history of diabetes mellitus I; hypertension.     2 days ago started feeling achy/ dry cough, fatigued.   Today his cough was productive, + Headaches.   No fevers. Patient is not a smoker- no history of asthma.       -------------------------------------    Problem list and histories reviewed & adjusted, as indicated.  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 (H)     Hyperlipidemia LDL goal <100     Facial cellulitis     Erectile dysfunction     Urinary urgency     Adhesive capsulitis of shoulder, left     Essential hypertension     Type 1 diabetes mellitus with hypoglycemia and without coma (H)     Past Surgical History:   Procedure Laterality Date     COLONOSCOPY  1/3/2014    Procedure: COMBINED COLONOSCOPY, SINGLE BIOPSY/POLYPECTOMY BY BIOPSY;  Colonoscopy;  Surgeon: Ed Galvez MD;  Location: [facility]     EXAM UNDER ANESTHESIA, MANIPULATE JOINT (LOCATION) Left 3/30/2016    Procedure: EXAM UNDER ANESTHESIA, MANIPULATE JOINT (LOCATION);  Surgeon: Justen Ford MD;  Location: [facility]     SURGICAL HISTORY OF -   08/97    appendectomy ([place], [place])        Social History   Substance Use  Topics     Smoking status: Never Smoker     Smokeless tobacco: Never Used     Alcohol use Yes      Comment: occ. 2-3 times a month     Family History   Problem Relation Age of Onset     C.A.D. Father      50s      Hypertension Father      Hypertension Mother      CEREBROVASCULAR DISEASE Mother      CANCER Maternal Grandfather      ?throat/lung cancer     CEREBROVASCULAR DISEASE Maternal Grandmother      Arthritis Sister      RA     CYolandaA.D. Brother      Circulatory Other      2 cousins on maternal side .w anurysms     Cancer - colorectal No family hx of      DIABETES No family hx of            Reviewed and updated as needed this visit by clinical staff       Reviewed and updated as needed this visit by Provider         ROS:  Constitutional, HEENT, cardiovascular, pulmonary, GI, , musculoskeletal, neuro, skin, endocrine and psych systems are negative, except as otherwise noted.    OBJECTIVE:     /68 (BP Location: Left arm, Patient Position: Chair, Cuff Size: Adult Large)  Pulse 63  Temp 98.2  F (36.8  C) (Tympanic)  Wt 169 lb (76.7 kg)  SpO2 99%  BMI 24.08 kg/m2  Body mass index is 24.08 kg/(m^2).  GENERAL: healthy, alert and no distress  HENT: normal cephalic/atraumatic, nose and mouth without ulcers or lesions, oropharynx clear and oral mucous membranes moist  NECK: no adenopathy, no asymmetry, masses, or scars and thyroid normal to palpation  RESP: lungs clear to auscultation - no rales, rhonchi or wheezes  CV: regular rate and rhythm, normal S1 S2, no S3 or S4, no murmur, click or rub, no peripheral edema and peripheral pulses strong  MS: no gross musculoskeletal defects noted, no edema    Diagnostic Test Results:  Results for orders placed or performed in visit on 02/26/18 (from the past 24 hour(s))   Influenza A/B antigen   Result Value Ref Range    Influenza A/B Agn Specimen Nasal     Influenza A Negative NEG^Negative    Influenza B Negative NEG^Negative       ASSESSMENT/PLAN:       1. Upper  respiratory tract infection, unspecified type  - 2-3 days of symptoms   - Influenza - negative  - discussed symptomatic treatment-   - offered patient albuterol inhaler and/or cough medication- patient declined   - Coricidin for cough as other cough medications can elevate blood pressure     2. Flu-like symptoms    - Influenza A/B antigen- Negative     3. 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       Patient Instructions       Adult Self-Care for Colds  Colds are caused by viruses. They can't be cured with antibiotics. However, you can ease symptoms and support your body's efforts to heal itself.  No matter which symptoms you have, be sure to:    Drink plenty of fluids (water or clear soup)    Stop smoking and drinking alcohol    Get plenty of rest    Understand a fever    Take your temperature several times a day. If your fever is 100.4 F (38.0 C) for more than a day, call your healthcare provider.    Relax, lie down. Go to bed if you want. Just get off your feet and rest. Also, drink plenty of fluids to avoid dehydration.    Take acetaminophen or a nonsteroidal anti-inflammatory agent (NSAID), such as ibuprofen.  Treat a troubled nose kindly    Breathe steam or heated humidified air to open blocked nasal passages.  a hot shower or use a vaporizer. Be careful not to get burned by the steam.    Saline nasal sprays and decongestant tablets help open a stuffy nose. Antihistamines can also help, but they can cause side effects such as drowsiness and drying of the eyes, nose, and mouth.  Soothe a sore throat and cough    Gargle every 2 hours with 1/4 teaspoon of salt dissolved in 1/2 cup of warm water. Suck on throat lozenges and cough drops to moisten your throat.    Cough medicines are available but it is unclear how well they actually work.    Take  acetaminophen or an NSAID, such as ibuprofen, to ease throat pain  Ease digestive problems    Put fluids back into your body. Take frequent sips of clear liquids such as water or broth. Avoid drinks that have a lot of sugar in them, such as juices and sodas. These can make diarrhea worse. Older children and adults can drink sports drinks.    As your appetite returns, you can resume your normal diet. Ask your healthcare provider if there are any foods you should avoid.  When to seek medical care  When you first notice symptoms, ask your healthcare provider if antiviral medicines are appropriate. Antibiotics should not be taken for colds or flu. Also, call your healthcare provider if you have any of the following symptoms or if you aren't feeling better after 7 days:    Shortness of breath    Pain or pressure in the chest or belly (abdomen)    Worsening symptoms, especially after a period of improvement    Fever of 100.4 F  (38.0 C) or higher, or fever that doesn't go down with medicine    Sudden dizziness or confusion    Severe or continued vomiting    Signs of dehydration, including extreme thirst, dark urine, infrequent urination, dry mouth    Spotted, red, or very sore throat   Date Last Reviewed: 12/1/2016 2000-2017 [employer]. [address] [place] [address], [place] [address]. All rights reserved. This information is not intended as a substitute for professional medical care. Always follow your healthcare professional's instructions.            LEXI Mclain [facility]

## 2018-02-26 NOTE — NURSING NOTE
"Initial /68 (BP Location: Left arm, Patient Position: Chair, Cuff Size: Adult Large)  Pulse 63  Temp 98.2  F (36.8  C) (Tympanic)  Wt 169 lb (76.7 kg)  SpO2 99%  BMI 24.08 kg/m2 Estimated body mass index is 24.08 kg/(m^2) as calculated from the following:    Height as of 11/21/17: 5' 10.25\" (1.784 m).    Weight as of this encounter: 169 lb (76.7 kg). .    Lisa Rinaldi    "

## 2018-02-26 NOTE — MR AVS SNAPSHOT
After Visit Summary   2/26/2018    Rangel Singh    MRN: [id number]           Patient Information     Date Of Birth          1965        Visit Information        Provider Department      2/26/2018 8:20 AM Ana Laura Bradshaw APRN 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 They can't be cured with antibiotics. However, you can ease symptoms and support your body's efforts to heal itself.  No matter which symptoms you have, be sure to:    Drink plenty of fluids (water or clear soup)    Stop smoking and drinking alcohol    Get plenty of rest    Understand a fever    Take your temperature several times a day. If your fever is 100.4 F (38.0 C) for more than a day, call your healthcare provider.    Relax, lie down. Go to bed if you want. Just get off your feet and rest. Also, drink plenty of fluids to avoid dehydration.    Take acetaminophen or a nonsteroidal anti-inflammatory agent (NSAID), such as ibuprofen.  Treat a troubled nose kindly    Breathe steam or heated humidified air to open blocked nasal passages.  a hot shower or use a vaporizer. Be careful not to get burned by the steam.    Saline nasal sprays and decongestant tablets help open a stuffy nose. Antihistamines can also help, but they can cause side effects such as drowsiness and drying of the eyes, nose, and mouth.  Soothe a sore throat and cough    Gargle every 2 hours with 1/4 teaspoon of salt dissolved in 1/2 cup of warm water. Suck on throat lozenges and cough drops to moisten your throat.    Cough medicines are available but it is unclear how well they actually work.    Take acetaminophen or an NSAID, such as ibuprofen, to ease throat  pain  Ease digestive problems    Put fluids back into your body. Take frequent sips of clear liquids such as water or broth. Avoid drinks that have a lot of sugar in them, such as juices and sodas. These can make diarrhea worse. Older children and adults can drink sports drinks.    As your appetite returns, you can resume your normal diet. Ask your healthcare provider if there are any foods you should avoid.  When to seek medical care  When you first notice symptoms, ask your healthcare provider if antiviral medicines are appropriate. Antibiotics should not be taken for colds or flu. Also, call your healthcare provider if you have any of the following symptoms or if you aren't feeling better after 7 days:    Shortness of breath    Pain or pressure in the chest or belly (abdomen)    Worsening symptoms, especially after a period of improvement    Fever of 100.4 F  (38.0 C) or higher, or fever that doesn't go down with medicine    Sudden dizziness or confusion    Severe or continued vomiting    Signs of dehydration, including extreme thirst, dark urine, infrequent urination, dry mouth    Spotted, red, or very sore throat   Date Last Reviewed: 12/1/2016 2000-2017 [employer]. [address] [place] [address]. All rights reserved. This information is not intended as a substitute for professional medical care. Always follow your healthcare professional's instructions.                Follow-ups after your visit        Who to contact     If you have questions or need follow up information about today's clinic visit or your schedule please contact [facility] directly at [phone].  Normal or non-critical lab and imaging results will be communicated to you by MyChart, letter or phone within 4 business days after the clinic has received the results. If you do not hear from us within 7 days, please contact the clinic through MyChart or phone. If you have a critical or abnormal lab  result, we will notify you by phone as soon as possible.  Submit refill requests through [employer] or call your pharmacy and they will forward the refill request to us. Please allow 3 business days for your refill to be completed.          Additional Information About Your Visit        [employer] Information     [employer] gives you secure access to your electronic health record. If you see a primary care provider, you can also send messages to your care team and make appointments. If you have questions, please call your primary care clinic.  If you do not have a primary care provider, please call [phone] and they will assist you.        Care EveryWhere ID     This is your Care EveryWhere ID. This could be used by other organizations to access your [facility] medical records  [id number]        Your Vitals Were     Pulse Temperature Pulse Oximetry BMI (Body Mass Index)          63 98.2  F (36.8  C) (Tympanic) 99% 24.08 kg/m2         Blood Pressure from Last 3 Encounters:   02/26/18 112/68   11/21/17 103/66   05/09/17 123/62    Weight from Last 3 Encounters:   02/26/18 169 lb (76.7 kg)   11/21/17 161 lb 9.6 oz (73.3 kg)   05/09/17 165 lb 12.8 oz (75.2 kg)              We Performed the Following     Influenza A/B antigen        Primary Care Provider Office Phone # Fax #    Ana Laura Ji [phone] [address] [address]        Equal Access to Services     LITZY KERNS AH: Hadii jonathon ku hadasho Soyonathanali, waaxda luqadaha, qaybta kaalmada adeegyada, cyndie keys. So [facility] [phone].    ATENCIÓN: Si habla español, tiene a rosario disposición servicios gratuitos de asistencia lingüística. Shane al [phone].    We comply with applicable federal civil rights laws and [place] laws. We do not discriminate on the basis of race, color, national origin, age, disability, sex, sexual orientation, or gender identity.            Thank you!     Thank you for  choosing [facility]  for your care. Our goal is always to provide you with excellent care. Hearing back from our patients is one way we can continue to improve our services. Please take a few minutes to complete the written survey that you may receive in the mail after your visit with us. Thank you!             Your Updated Medication List - Protect others around you: Learn how to safely use, store and throw away your medicines at www.disposemymeds.org.          This list is accurate as of 2/26/18  8:56 AM.  Always use your most recent med list.                   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       glucagon 1 MG kit    GLUCAGON EMERGENCY    1 mg    Inject 1 mg into the muscle once for 1 dose    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       insulin glargine 100 UNIT/ML injection    LANTUS    10 mL    Inject 2-5 Units Subcutaneous At Bedtime    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       insulin lispro 100 UNIT/ML injection    humaLOG    1 vial    2-3 units in am, 2-3 lunch, 2-4 units in pm and sliding scale.  Estimated maximum units daily is 25 units per day.    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, macular edema  presence unspecified, unspecified retinopathy severity (H)       insulin syringes (disposable) U-100 0.3 ML Misc     100 each    1 Device 4 times daily    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       lisinopril 10 MG tablet    PRINIVIL/ZESTRIL    90 tablet    Take 1 tablet (10 mg) by mouth daily    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       order for DME     1 each    Equipment being ordered: Continue glucose monitor - Dexcom G4 - , transmitter, sensor    Type 1 diabetes mellitus with diabetic retinopathy, macular edema presence unspecified, with unspecified retinopathy severity       TRUEPLUS LANCETS 33G Misc     300 each    1 DEVICE 4 TIMES DAILY    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

## 2018-02-26 NOTE — PATIENT INSTRUCTIONS
Adult Self-Care for Colds  Colds are caused by viruses. They can't be cured with antibiotics. However, you can ease symptoms and support your body's efforts to heal itself.  No matter which symptoms you have, be sure to:    Drink plenty of fluids (water or clear soup)    Stop smoking and drinking alcohol    Get plenty of rest    Understand a fever    Take your temperature several times a day. If your fever is 100.4 F (38.0 C) for more than a day, call your healthcare provider.    Relax, lie down. Go to bed if you want. Just get off your feet and rest. Also, drink plenty of fluids to avoid dehydration.    Take acetaminophen or a nonsteroidal anti-inflammatory agent (NSAID), such as ibuprofen.  Treat a troubled nose kindly    Breathe steam or heated humidified air to open blocked nasal passages.  a hot shower or use a vaporizer. Be careful not to get burned by the steam.    Saline nasal sprays and decongestant tablets help open a stuffy nose. Antihistamines can also help, but they can cause side effects such as drowsiness and drying of the eyes, nose, and mouth.  Soothe a sore throat and cough    Gargle every 2 hours with 1/4 teaspoon of salt dissolved in 1/2 cup of warm water. Suck on throat lozenges and cough drops to moisten your throat.    Cough medicines are available but it is unclear how well they actually work.    Take acetaminophen or an NSAID, such as ibuprofen, to ease throat pain  Ease digestive problems    Put fluids back into your body. Take frequent sips of clear liquids such as water or broth. Avoid drinks that have a lot of sugar in them, such as juices and sodas. These can make diarrhea worse. Older children and adults can drink sports drinks.    As your appetite returns, you can resume your normal diet. Ask your healthcare provider if there are any foods you should avoid.  When to seek medical care  When you first notice symptoms, ask your healthcare provider if antiviral medicines are  appropriate. Antibiotics should not be taken for colds or flu. Also, call your healthcare provider if you have any of the following symptoms or if you aren't feeling better after 7 days:    Shortness of breath    Pain or pressure in the chest or belly (abdomen)    Worsening symptoms, especially after a period of improvement    Fever of 100.4 F  (38.0 C) or higher, or fever that doesn't go down with medicine    Sudden dizziness or confusion    Severe or continued vomiting    Signs of dehydration, including extreme thirst, dark urine, infrequent urination, dry mouth    Spotted, red, or very sore throat   Date Last Reviewed: 12/1/2016 2000-2017 [employer]. [address] [place] [address], [place] [address]. All rights reserved. This information is not intended as a substitute for professional medical care. Always follow your healthcare professional's instructions.

## 2018-03-02 ENCOUNTER — OFFICE VISIT (OUTPATIENT)
Dept: FAMILY MEDICINE | Facility: CLINIC | Age: 53
End: 2018-03-02
Payer: COMMERCIAL

## 2018-03-02 VITALS
TEMPERATURE: 99.7 F | HEIGHT: 70 IN | DIASTOLIC BLOOD PRESSURE: 57 MMHG | HEART RATE: 70 BPM | RESPIRATION RATE: 18 BRPM | SYSTOLIC BLOOD PRESSURE: 109 MMHG | BODY MASS INDEX: 24.02 KG/M2 | WEIGHT: 167.8 LBS | OXYGEN SATURATION: 94 %

## 2018-03-02 DIAGNOSIS — J20.9 ACUTE BRONCHITIS WITH SYMPTOMS > 10 DAYS: Primary | ICD-10-CM

## 2018-03-02 PROCEDURE — 99213 OFFICE O/P EST LOW 20 MIN: CPT | Performed by: NURSE PRACTITIONER

## 2018-03-02 RX ORDER — ALBUTEROL SULFATE 90 UG/1
2 AEROSOL, METERED RESPIRATORY (INHALATION) EVERY 6 HOURS PRN
Qty: 1 INHALER | Refills: 0 | Status: SHIPPED | OUTPATIENT
Start: 2018-03-02 | End: 2019-03-07

## 2018-03-02 RX ORDER — AZITHROMYCIN 250 MG/1
TABLET, FILM COATED ORAL
Qty: 6 TABLET | Refills: 0 | Status: SHIPPED | OUTPATIENT
Start: 2018-03-02 | End: 2018-06-07

## 2018-03-02 RX ORDER — CODEINE PHOSPHATE AND GUAIFENESIN 10; 100 MG/5ML; MG/5ML
1 SOLUTION ORAL EVERY 4 HOURS PRN
Qty: 120 ML | Refills: 0 | Status: SHIPPED | OUTPATIENT
Start: 2018-03-02 | End: 2018-06-07

## 2018-03-02 NOTE — PROGRESS NOTES
"  SUBJECTIVE:   Rangel Singh is a 52 year old male who presents to clinic today for the following health issues: ill since 2/25, cough, fevers, shortness of breath, nasal congestion and wheezing. Was evaluated on Monday, tested negative for influenza.     ENT Symptoms             Symptoms: cc Present Absent Comment   Fever/Chills  x  High temp of 101 orally this morning at 2:00am; ibuprofen taken today at 6:30am   Fatigue  x     Muscle Aches  x     Eye Irritation   x    Sneezing  x  little   Nasal Calvin/Drg  x     Sinus Pressure/Pain   x    Loss of smell  x     Dental pain   x    Sore Throat   x    Swollen Glands   x    Ear Pain/Fullness   x    Cough  x     Wheeze  x     Chest Pain  x     Shortness of breath  x     Rash   x    Other  x  Loss of appetite, loss of taste, phlegm, headache, stomach ache     Symptom duration:  2/25/18   Symptom severity:  moderate   Treatments tried:  ibuprofen, dayquil   Contacts:  work     Problem list and histories reviewed & adjusted, as indicated.  Additional history: as documented    Labs reviewed in EPIC    Reviewed and updated as needed this visit by clinical staff  Tobacco  Allergies  Meds  Med Hx  Surg Hx  Fam Hx  Soc Hx      Reviewed and updated as needed this visit by Provider         ROS:  Constitutional, HEENT, cardiovascular, pulmonary, gi and gu systems are negative, except as otherwise noted.    OBJECTIVE:     /57  Pulse 70  Temp 99.7  F (37.6  C) (Tympanic)  Resp 18  Ht 5' 10.25\" (1.784 m)  Wt 167 lb 12.8 oz (76.1 kg)  SpO2 94%  BMI 23.91 kg/m2  Body mass index is 23.91 kg/(m^2).  GENERAL: healthy, alert and no distress  EYES: Eyes grossly normal to inspection, PERRL and conjunctivae and sclerae normal  HENT: ear canals and TM's normal, nose and mouth without ulcers or lesions  NECK: no adenopathy, no asymmetry, masses, or scars and thyroid normal to palpation  RESP: occasional bilateral expiratory wheezes, otherwise clear.   CV: regular rate and " rhythm, normal S1 S2, no S3 or S4, no murmur, click or rub, no peripheral edema and peripheral pulses strong  PSYCH: mentation appears normal, affect normal/bright    Diagnostic Test Results:  none     ASSESSMENT/PLAN:     1. Acute bronchitis with symptoms > 10 days  - azithromycin (ZITHROMAX) 250 MG tablet; Two tablets first day, then one tablet daily for four days.  Dispense: 6 tablet; Refill: 0  - guaiFENesin-codeine (ROBITUSSIN AC) 100-10 MG/5ML SOLN solution; Take 5 mLs by mouth every 4 hours as needed for cough  Dispense: 120 mL; Refill: 0  - albuterol (PROAIR HFA/PROVENTIL HFA/VENTOLIN HFA) 108 (90 BASE) MCG/ACT Inhaler; Inhale 2 puffs into the lungs every 6 hours as needed for shortness of breath / dyspnea or wheezing  Dispense: 1 Inhaler; Refill: 0  -drink more fluids  -rest  -if no improvement in 3-4 days follow up    See Patient Instructions    LEXI Rosales [facility]

## 2018-03-02 NOTE — NURSING NOTE
"Chief Complaint   Patient presents with     URI     was seen on 2/26 with Ana Laura Bradshaw; began on 2/25/18 - seems to be getting worse       Initial /57  Pulse 70  Temp 99.7  F (37.6  C) (Tympanic)  Resp 18  Ht 5' 10.25\" (1.784 m)  Wt 167 lb 12.8 oz (76.1 kg)  SpO2 94%  BMI 23.91 kg/m2 Estimated body mass index is 23.91 kg/(m^2) as calculated from the following:    Height as of this encounter: 5' 10.25\" (1.784 m).    Weight as of this encounter: 167 lb 12.8 oz (76.1 kg).  Medication Reconciliation: complete  "

## 2018-03-02 NOTE — MR AVS SNAPSHOT
After Visit Summary   3/2/2018    Rangel Singh    MRN: [id number]           Patient Information     Date Of Birth          1965        Visit Information        Provider Department      3/2/2018 7:40 AM Laura Beatty APRN CNP [facility]        Today's Diagnoses     Acute bronchitis with symptoms > 10 days    -  1      Care Instructions      Cough syrup 5 ml every 4-6 hrs as needed   Albuterol every 1-2 puffs every 4-6 hrs as needed for wheezing and shortness of breath.  Z-pack for 5 days, take 2 tabs today and than 1 tablet daily on day 2-5  Drink more fluids  Rest           Thank you for choosing [facility].  You may be receiving a survey in the mail from [employer] regarding your visit today.  Please take a few minutes to complete and return the survey to let us know how we are doing.      If you have questions or concerns, please contact us via [email or web address] or you can contact your care team at [phone].    Our Clinic hours are:  Monday 6:40 am  to 7:00 pm  Tuesday -Friday 6:40 am to 5:00 pm    The [place] outpatient lab hours are:  Monday - Friday 6:10 am to 4:45 pm  Saturdays 7:00 am to 11:00 am  Appointments are required, call [phone]    If you have clinical questions after hours or would like to schedule an appointment,  call the clinic at [phone].          Follow-ups after your visit        Who to contact     If you have questions or need follow up information about today's clinic visit or your schedule please contact [facility] directly at [phone].  Normal or non-critical lab and imaging results will be communicated to you by MyChart, letter or phone within 4 business days after the clinic has received the results. If you do not hear from us within 7 days, please contact the clinic through [employer] or phone. If you have a critical or abnormal lab result, we will notify you by phone as soon as possible.  Submit refill requests  "through [employer] or call your pharmacy and they will forward the refill request to us. Please allow 3 business days for your refill to be completed.          Additional Information About Your Visit        [employer] Information     [employer] gives you secure access to your electronic health record. If you see a primary care provider, you can also send messages to your care team and make appointments. If you have questions, please call your primary care clinic.  If you do not have a primary care provider, please call [phone] and they will assist you.        Care EveryWhere ID     This is your Care EveryWhere ID. This could be used by other organizations to access your [facility] medical records  [id number]        Your Vitals Were     Pulse Temperature Respirations Height Pulse Oximetry BMI (Body Mass Index)    70 99.7  F (37.6  C) (Tympanic) 18 5' 10.25\" (1.784 m) 94% 23.91 kg/m2       Blood Pressure from Last 3 Encounters:   03/02/18 109/57   02/26/18 112/68   11/21/17 103/66    Weight from Last 3 Encounters:   03/02/18 167 lb 12.8 oz (76.1 kg)   02/26/18 169 lb (76.7 kg)   11/21/17 161 lb 9.6 oz (73.3 kg)              Today, you had the following     No orders found for display         Today's Medication Changes          These changes are accurate as of 3/2/18  8:03 AM.  If you have any questions, ask your nurse or doctor.               Start taking these medicines.        Dose/Directions    albuterol 108 (90 BASE) MCG/ACT Inhaler   Commonly known as:  PROAIR HFA/PROVENTIL HFA/VENTOLIN HFA   Used for:  Acute bronchitis with symptoms > 10 days   Started by:  Laura Beatty APRN CNP        Dose:  2 puff   Inhale 2 puffs into the lungs every 6 hours as needed for shortness of breath / dyspnea or wheezing   Quantity:  1 Inhaler   Refills:  0       azithromycin 250 MG tablet   Commonly known as:  ZITHROMAX   Used for:  Acute bronchitis with symptoms > 10 days   Started by:  Laura Beatty APRN CNP     "    Two tablets first day, then one tablet daily for four days.   Quantity:  6 tablet   Refills:  0       guaiFENesin-codeine 100-10 MG/5ML Soln solution   Commonly known as:  ROBITUSSIN AC   Used for:  Acute bronchitis with symptoms > 10 days   Started by:  Laura Beatty APRN CNP        Dose:  1 tsp.   Take 5 mLs by mouth every 4 hours as needed for cough   Quantity:  120 mL   Refills:  0            Where to get your medicines      These medications were sent to [facility] [address] [place] [address] [address] [facility]  [address] [facility] [address]     Phone:  [phone]     albuterol 108 (90 BASE) MCG/ACT Inhaler    azithromycin 250 MG tablet         Some of these will need a paper prescription and others can be bought over the counter.  Ask your nurse if you have questions.     Bring a paper prescription for each of these medications     guaiFENesin-codeine 100-10 MG/5ML Soln solution                Primary Care Provider Office Phone # Fax #    Ana Laura Ibanez Ji,  [phone] [address] [facility] [address]        Equal Access to Services     LITZY KERNS AH: Hadii aad ku hadasho Somarcus, waaxda luqadaha, qaybta kaalmada adedolores, cyndie ornelas . So [facility] [phone].    ATENCIÓN: Si habla español, tiene a rosario disposición servicios gratuitos de asistencia lingüística. Llame al [phone].    We comply with applicable federal civil rights laws and [place] laws. We do not discriminate on the basis of race, color, national origin, age, disability, sex, sexual orientation, or gender identity.            Thank you!     Thank you for choosing [facility]  for your care. Our goal is always to provide you with excellent care. Hearing back from our patients is one way we can continue to improve our services. Please take a few minutes to complete the written survey that you may receive in the mail after your visit with us.  Thank you!             Your Updated Medication List - Protect others around you: Learn how to safely use, store and throw away your medicines at www.disposemymeds.org.          This list is accurate as of 3/2/18  8:03 AM.  Always use your most recent med list.                   Brand Name Dispense Instructions for use Diagnosis    albuterol 108 (90 BASE) MCG/ACT Inhaler    PROAIR HFA/PROVENTIL HFA/VENTOLIN HFA    1 Inhaler    Inhale 2 puffs into the lungs every 6 hours as needed for shortness of breath / dyspnea or wheezing    Acute bronchitis with symptoms > 10 days       aspirin 81 MG tablet     100    1 tab po QD (Once per day)        atorvastatin 10 MG tablet    LIPITOR    45 tablet    Take 1 tablet (10 mg) by mouth every other day    Hyperlipidemia LDL goal <100, 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       azithromycin 250 MG tablet    ZITHROMAX    6 tablet    Two tablets first day, then one tablet daily for four days.    Acute bronchitis with symptoms > 10 days       blood glucose monitoring test strip    no brand specified    100 each    Check BS 4-5x/day.  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       glucagon 1 MG kit    GLUCAGON EMERGENCY    1 mg    Inject 1 mg into the muscle once for 1 dose    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       guaiFENesin-codeine 100-10 MG/5ML Soln solution    ROBITUSSIN AC    120 mL    Take 5 mLs by mouth every 4 hours as needed for cough    Acute bronchitis with symptoms > 10 days       insulin glargine 100 UNIT/ML injection    LANTUS    10 mL    Inject 2-5 Units Subcutaneous At Bedtime    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, macular edema presence unspecified,  unspecified retinopathy severity (H)       insulin lispro 100 UNIT/ML injection    humaLOG    1 vial    2-3 units in am, 2-3 lunch, 2-4 units in pm and sliding scale.  Estimated maximum units daily is 25 units per day.    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

## 2018-03-02 NOTE — PATIENT INSTRUCTIONS
Cough syrup 5 ml every 4-6 hrs as needed   Albuterol every 1-2 puffs every 4-6 hrs as needed for wheezing and shortness of breath.  Z-pack for 5 days, take 2 tabs today and than 1 tablet daily on day 2-5  Drink more fluids  Rest           Thank you for choosing [facility].  You may be receiving a survey in the mail from [facility] regarding your visit today.  Please take a few minutes to complete and return the survey to let us know how we are doing.      If you have questions or concerns, please contact us via [employer] or you can contact your care team at [phone].    Our Clinic hours are:  Monday 6:40 am  to 7:00 pm  Tuesday -Friday 6:40 am to 5:00 pm    The [place] outpatient lab hours are:  Monday - Friday 6:10 am to 4:45 pm  Saturdays 7:00 am to 11:00 am  Appointments are required, call [phone]    If you have clinical questions after hours or would like to schedule an appointment,  call the clinic at [phone].

## 2018-03-09 ENCOUNTER — TRANSFERRED RECORDS (OUTPATIENT)
Dept: HEALTH INFORMATION MANAGEMENT | Facility: CLINIC | Age: 53
End: 2018-03-09

## 2018-03-20 DIAGNOSIS — E10.319 TYPE 1 DIABETES MELLITUS WITH RETINOPATHY OF BOTH EYES, MACULAR EDEMA PRESENCE UNSPECIFIED, UNSPECIFIED RETINOPATHY SEVERITY (H): ICD-10-CM

## 2018-05-25 DIAGNOSIS — E10.319 TYPE 1 DIABETES MELLITUS WITH RETINOPATHY OF BOTH EYES, MACULAR EDEMA PRESENCE UNSPECIFIED, UNSPECIFIED RETINOPATHY SEVERITY (H): ICD-10-CM

## 2018-05-25 LAB — HBA1C MFR BLD: 6 % (ref 0–5.6)

## 2018-05-25 PROCEDURE — 83036 HEMOGLOBIN GLYCOSYLATED A1C: CPT | Performed by: INTERNAL MEDICINE

## 2018-05-25 PROCEDURE — 36415 COLL VENOUS BLD VENIPUNCTURE: CPT | Performed by: INTERNAL MEDICINE

## 2018-06-07 ENCOUNTER — OFFICE VISIT (OUTPATIENT)
Dept: FAMILY MEDICINE | Facility: CLINIC | Age: 53
End: 2018-06-07
Payer: COMMERCIAL

## 2018-06-07 VITALS
TEMPERATURE: 96.3 F | SYSTOLIC BLOOD PRESSURE: 106 MMHG | HEART RATE: 49 BPM | DIASTOLIC BLOOD PRESSURE: 62 MMHG | WEIGHT: 161 LBS | BODY MASS INDEX: 22.94 KG/M2 | OXYGEN SATURATION: 99 %

## 2018-06-07 DIAGNOSIS — I10 ESSENTIAL HYPERTENSION: ICD-10-CM

## 2018-06-07 DIAGNOSIS — E10.3293 TYPE 1 DIABETES MELLITUS WITH MILD NONPROLIFERATIVE RETINOPATHY OF BOTH EYES WITHOUT MACULAR EDEMA (H): Primary | Chronic | ICD-10-CM

## 2018-06-07 DIAGNOSIS — E78.5 HYPERLIPIDEMIA LDL GOAL <100: Chronic | ICD-10-CM

## 2018-06-07 DIAGNOSIS — Z71.89 ADVANCED DIRECTIVES, COUNSELING/DISCUSSION: ICD-10-CM

## 2018-06-07 PROCEDURE — 99214 OFFICE O/P EST MOD 30 MIN: CPT | Performed by: INTERNAL MEDICINE

## 2018-06-07 NOTE — MR AVS SNAPSHOT
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  If you want to look into 1/2 unit syringes, let Dr. Ji know, I can order over the phone.  2. Return to clinic in Nov, fasting blood work prior.  If need to do non-fasting, that's ok too  3. Make appointment for shingles shot after your race.          Follow-ups after your visit        Future tests that were ordered for you today     Open Future Orders        Priority Expected Expires Ordered    Hemoglobin A1c Routine 12/6/2018 3/8/2019 6/7/2018    **TSH with free T4 reflex FUTURE 6mo Routine 12/4/2018 1/3/2019 6/7/2018    Albumin Random Urine Quantitative with Creat Ratio Routine 12/4/2018 1/3/2019 6/7/2018    Basic metabolic panel Routine 12/6/2018 3/8/2019 6/7/2018    Lipid panel reflex to direct LDL Fasting Routine 12/4/2018 1/3/2019 6/7/2018            Who to contact     If you have questions or need follow up information about today's clinic visit or your schedule please contact [facility] directly at [phone].  Normal or non-critical lab and imaging results will be communicated to you by MyChart, letter or phone within 4 business days after the clinic has received the results. If you do not hear from us within 7 days, please contact the clinic through [employer] or phone. If you have a critical or abnormal lab result, we will notify you by phone as soon as possible.  Submit refill requests through [employer] or call your pharmacy and they will  forward the refill request to us. Please allow 3 business days for your refill to be completed.          Additional Information About Your Visit        [email or web address] Information     [employer] gives you secure access to your electronic health record. If you see a primary care provider, you can also send messages to your care team and make appointments. If you have questions, please call your primary care clinic.  If you do not have a primary care provider, please call [phone] and they will assist you.        Your Vitals Were     Pulse Temperature Pulse Oximetry BMI (Body Mass Index)          49 96.3  F (35.7  C) (Tympanic) 99% 22.94 kg/m2         Blood Pressure from Last 3 Encounters:   06/07/18 106/62   03/02/18 109/57   02/26/18 112/68    Weight from Last 3 Encounters:   06/07/18 161 lb (73 kg)   03/02/18 167 lb 12.8 oz (76.1 kg)   02/26/18 169 lb (76.7 kg)               Primary Care Provider Office Phone # Fax #    Ana Laura Marcelle Ji [phone] [address] [facility] [address]        Equal Access to Services     LITZY KERNS : Hadii jonathon ku hadasho Soyonathanali, waaxda luqadaha, qaybta kaalmada adeegyada, cyndie ornelas . So [facility] [phone].    ATENCIÓN: Si habla español, tiene a rosario disposición servicios gratuitos de asistencia lingüística. Llame al [phone].    We comply with applicable federal civil rights laws and [place] laws. We do not discriminate on the basis of race, color, national origin, age, disability, sex, sexual orientation, or gender identity.            Thank you!     Thank you for choosing [facility]  for your care. Our goal is always to provide you with excellent care. Hearing back from our patients is one way we can continue to improve our services. Please take a few minutes to complete the written survey that you may receive in the mail after your visit with us. Thank you!             Your Updated Medication List - Protect  others around you: Learn how to safely use, store and throw away your medicines at www.disposemymeds.org.          This list is accurate as of 6/7/18  3:47 PM.  Always use your most recent med list.                   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  Dispense 3 month supply. Accu-Check Compact Plus.    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  Estimated maximum units daily is 25 units per day.    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       insulin syringes (disposable)  U-100 0.3 ML Misc     100 each    1 Device 4 times daily    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       lisinopril 10 MG tablet    PRINIVIL/ZESTRIL    90 tablet    Take 1 tablet (10 mg) by mouth daily    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       order for DME     1 each    Equipment being ordered: Continue glucose monitor - Dexcom G4 - , transmitter, sensor    Type 1 diabetes mellitus with diabetic retinopathy, macular edema presence unspecified, with unspecified retinopathy severity       TRUEPLUS LANCETS 33G Misc     300 each    1 DEVICE 4 TIMES DAILY    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

## 2018-06-07 NOTE — PROGRESS NOTES
SUBJECTIVE:   Rangel Singh is a 53 year old male who presents to clinic today for the following health issues:      Diabetes Follow-up    Patient is checking blood sugars: four times daily.    Blood sugar testing frequency justification: Uncontrolled diabetes  Results are as follows:         am - 90 to 110         lunchtime - 80 to 100         suppertime - 110          bedtime - 80    Diabetic concerns: None     Symptoms of hypoglycemia (low blood sugar): none     Paresthesias (numbness or burning in feet) or sores: Yes      Date of last diabetic eye exam: [facility] - not in Epic - Pt did signed  LISSETTE today    Having hypoglycemia 4-5 x month per Dexcom.  He has hypoglycemia unawareness. Never has hypoglycemia during exercise, but the night after.  Typically with physical labor but not with running, which he does often.    He has glucagon. He doesn't wear medical alert bracelet.  His co-workers are aware    He does have bedtime snack consistently    Humalog 3 with breakfast/lunch, 5-6 units with dinner and Lantus 4 units HS.  Not running, so using higher amounts.     He prefers vials over insulin pens    He has DEXCOM due to hypoglycemia unawareness    He has tingling and numbness in right great toe after running.  Never happens other times.  He was told it was due to anatomic foot problem, not neuropathy.     Hyperlipidemia Follow-Up      Rate your low fat/cholesterol diet?: good    Taking statin?  Yes, no muscle aches from statin    Other lipid medications/supplements?:  none    Hypertension Follow-up      Outpatient blood pressures are being checked at home.  Results are 100/60.    Low Salt Diet: low salt    BP Readings from Last 2 Encounters:   06/07/18 106/62   03/02/18 109/57     Hemoglobin A1C (%)   Date Value   05/25/2018 6.0 (H)   11/18/2017 6.3 (H)     LDL Cholesterol Calculated (mg/dL)   Date Value   11/18/2017 80   10/22/2016 91     LDL Cholesterol Direct (mg/dL)   Date Value   11/18/2017 96        Amount of exercise or physical activity: 4-5 days/week for an average of greater than 60 minutes    Problems taking medications regularly: No    Medication side effects: none    Diet: low salt          Current Outpatient Prescriptions   Medication Sig Dispense Refill     ASPIRIN 81 MG OR TABS 1 tab po QD (Once per day) 100 3     atorvastatin (LIPITOR) 10 MG tablet Take 1 tablet (10 mg) by mouth every other day 45 tablet 3     blood glucose monitoring (NO BRAND SPECIFIED) test strip Check BS 4-5x/day.  Dispense 3 month supply. Accu-Check Compact Plus. 450 each 2     insulin glargine (LANTUS) 100 UNIT/ML injection Inject 2-5 Units Subcutaneous At Bedtime 10 mL 11     insulin lispro (HUMALOG) 100 UNIT/ML injection 2-3 units in am, 2-3 lunch, 2-4 units in pm and sliding scale.  Estimated maximum units daily is 25 units per day. 1 vial 11     insulin syringes, disposable, U-100 0.3 ML MISC 1 Device 4 times daily 100 each 11     lisinopril (PRINIVIL/ZESTRIL) 10 MG tablet Take 1 tablet (10 mg) by mouth daily 90 tablet 3     order for DME Equipment being ordered: Continue glucose monitor - Dexcom G4 - , transmitter, sensor 1 each prn     TRUEPLUS LANCETS 33G MISC 1 DEVICE 4 TIMES DAILY 300 each 3     albuterol (PROAIR HFA/PROVENTIL HFA/VENTOLIN HFA) 108 (90 BASE) MCG/ACT Inhaler Inhale 2 puffs into the lungs every 6 hours as needed for shortness of breath / dyspnea or wheezing (Patient not taking: Reported on 6/7/2018) 1 Inhaler 0     glucagon (GLUCAGON EMERGENCY) 1 MG kit Inject 1 mg into the muscle once for 1 dose 1 mg 11       Reviewed and updated as needed this visit by clinical staff  Tobacco  Allergies  Meds  Problems  Med Hx  Surg Hx  Fam Hx  Soc Hx        Reviewed and updated as needed this visit by Provider  Allergies  Meds  Problems         ROS:  Constitutional, HEENT, cardiovascular, pulmonary, gi and gu systems are negative, except as otherwise noted.    OBJECTIVE:     /62 (BP  Location: Right arm, Patient Position: Sitting, Cuff Size: Adult Regular)  Pulse (!) 49  Temp 96.3  F (35.7  C) (Tympanic)  Wt 161 lb (73 kg)  SpO2 99%  BMI 22.94 kg/m2  Body mass index is 22.94 kg/(m^2).  GENERAL APPEARANCE: healthy, alert and no distress    Diagnostic Test Results:  No results found for this or any previous visit (from the past 24 hour(s)).    ASSESSMENT/PLAN:     1. 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  Recheck in nov.  May consider 1/2 unit syringes, which I can order via phone  - Hemoglobin A1c; Future  - **TSH with free T4 reflex FUTURE 6mo; Future  - Albumin Random Urine Quantitative with Creat Ratio; Future  - Basic metabolic panel; Future  - Lipid panel reflex to direct LDL Fasting; Future    2. Hyperlipidemia LDL goal <100 - stable, on statin    3. Essential hypertension - well controlled    4. Advanced directives, counseling/discussion - given honoring choices      Patient Instructions   1.  If you want to look into 1/2 unit syringes, let Dr. Ji know, I can order over the phone.  2. Return to clinic in Nov, fasting blood work prior.  If need to do non-fasting, that's ok too  3. Make appointment for shingles shot after your race.      Ana Laura Ji, DO  [facility]

## 2018-07-06 ENCOUNTER — ALLIED HEALTH/NURSE VISIT (OUTPATIENT)
Dept: FAMILY MEDICINE | Facility: CLINIC | Age: 53
End: 2018-07-06
Payer: COMMERCIAL

## 2018-07-06 DIAGNOSIS — Z23 NEED FOR VACCINATION: Primary | ICD-10-CM

## 2018-07-06 PROCEDURE — 90750 HZV VACC RECOMBINANT IM: CPT

## 2018-07-06 PROCEDURE — 90471 IMMUNIZATION ADMIN: CPT

## 2018-07-06 PROCEDURE — 99207 ZZC NO CHARGE NURSE ONLY: CPT

## 2018-07-06 NOTE — MR AVS SNAPSHOT
After Visit Summary   7/6/2018    Rangel Singh    MRN: [id number]           Patient Information     Date Of Birth          1965        Visit Information        Provider Department      7/6/2018 10:45 AM [place] NADIYA ROLLINS/LPN [facility]        Today's Diagnoses     Need for vaccination    -  1       Follow-ups after your visit        Who to contact     If you have questions or need follow up information about today's clinic visit or your schedule please contact [facility] directly at [phone].  Normal or non-critical lab and imaging results will be communicated to you by [email or web address], letter or phone within 4 business days after the clinic has received the results. If you do not hear from us within 7 days, please contact the clinic through [email or web address] or phone. If you have a critical or abnormal lab result, we will notify you by phone as soon as possible.  Submit refill requests through Freespee or call your pharmacy and they will forward the refill request to us. Please allow 3 business days for your refill to be completed.          Additional Information About Your Visit        MyChart Information     Freespee gives you secure access to your electronic health record. If you see a primary care provider, you can also send messages to your care team and make appointments. If you have questions, please call your primary care clinic.  If you do not have a primary care provider, please call [phone] and they will assist you.         Blood Pressure from Last 3 Encounters:   06/07/18 106/62   03/02/18 109/57   02/26/18 112/68    Weight from Last 3 Encounters:   06/07/18 161 lb (73 kg)   03/02/18 167 lb 12.8 oz (76.1 kg)   02/26/18 169 lb (76.7 kg)              We Performed the Following     1st  Administration  [[id number]]     SHINGRIX [[id number]]        Primary Care Provider Office Phone # Fax #    Ana Laura Ji [phone] [address]  [facility] [address]        Equal Access to Services     LITZY KERNS : Hadii aad ku hadmarianawesly Sami, wamaria gda luqadaha, qaybta kaalmaender landers, cyndie keys. So [facility] [phone].    ATENCIÓN: Si habla español, tiene a rosario disposición servicios gratuitos de asistencia lingüística. Shane al [phone].    We comply with applicable federal civil rights laws and [place] laws. We do not discriminate on the basis of race, color, national origin, age, disability, sex, sexual orientation, or gender identity.            Thank you!     Thank you for choosing [facility]  for your care. Our goal is always to provide you with excellent care. Hearing back from our patients is one way we can continue to improve our services. Please take a few minutes to complete the written survey that you may receive in the mail after your visit with us. Thank you!             Your Updated Medication List - Protect others around you: Learn how to safely use, store and throw away your medicines at www.disposemymeds.org.          This list is accurate as of 7/6/18 11:10 AM.  Always use your most recent med list.                   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  Dispense 3 month supply. Accu-Check Compact Plus.     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  Estimated maximum units daily is 25 units per day.    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

## 2018-07-06 NOTE — NURSING NOTE
Prior to injection verified patient identity using patient's name and date of birth.  Due to injection administration, patient instructed to remain in clinic for 15 minutes  afterwards, and to report any adverse reaction to me immediately.    - Inquired with pt if he had checked with insurance prior to injection. He said he had and it would be covered. He also stated he would pay regardless. Informed that he would need the 2nd dose in the series in 6 months.   Becca Molina MA  10:59 AM 7/6/2018    Screening Questionnaire for Adult Immunization    Are you sick today?   No   Do you have allergies to medications, food, a vaccine component or latex?   No   Have you ever had a serious reaction after receiving a vaccination?   No   Do you have a long-term health problem with heart disease, lung disease, asthma, kidney disease, metabolic disease (e.g. diabetes), anemia, or other blood disorder?   Yes - diabetes   Do you have cancer, leukemia, HIV/AIDS, or any other immune system problem?   No   In the past 3 months, have you taken medications that affect  your immune system, such as prednisone, other steroids, or anticancer drugs; drugs for the treatment of rheumatoid arthritis, Crohn s disease, or psoriasis; or have you had radiation treatments?   No   Have you had a seizure, or a brain or other nervous system problem?   No   During the past year, have you received a transfusion of blood or blood     products, or been given immune (gamma) globulin or antiviral drug?   No   For women: Are you pregnant or is there a chance you could become        pregnant during the next month?   No   Have you received any vaccinations in the past 4 weeks?   No     Immunization questionnaire answers were all negative.      Pt had spoken with PCP prior to see if he should receive vaccine.   Per orders of Dr. Ana Laura Ji, injection of Shingrix given by Becca Molina. Patient instructed to remain in clinic for 15 minutes afterwards, and  to report any adverse reaction to me immediately.       Screening performed by Becca Molina on 7/6/2018 at 10:57 AM.

## 2018-12-18 ENCOUNTER — ALLIED HEALTH/NURSE VISIT (OUTPATIENT)
Dept: FAMILY MEDICINE | Facility: CLINIC | Age: 53
End: 2018-12-18
Payer: COMMERCIAL

## 2018-12-18 DIAGNOSIS — Z23 NEED FOR VACCINATION: Primary | ICD-10-CM

## 2018-12-18 PROCEDURE — 99207 ZZC NO CHARGE NURSE ONLY: CPT

## 2018-12-18 PROCEDURE — 90471 IMMUNIZATION ADMIN: CPT

## 2018-12-18 PROCEDURE — 90750 HZV VACC RECOMBINANT IM: CPT

## 2018-12-18 NOTE — PROGRESS NOTES
Chief Complaint   Patient presents with     Imm/Inj     Shingrix #2- patient checked with insurance and this is covered.      Health Maintenance     declined flu shot        Screening Questionnaire for Adult Immunization    Are you sick today?   No   Do you have allergies to medications, food, a vaccine component or latex?   No   Have you ever had a serious reaction after receiving a vaccination?   Yes   Do you have a long-term health problem with heart disease, lung disease, asthma, kidney disease, metabolic disease (e.g. diabetes), anemia, or other blood disorder?   No   Do you have cancer, leukemia, HIV/AIDS, or any other immune system problem?   No   In the past 3 months, have you taken medications that affect  your immune system, such as prednisone, other steroids, or anticancer drugs; drugs for the treatment of rheumatoid arthritis, Crohn s disease, or psoriasis; or have you had radiation treatments?   No   Have you had a seizure, or a brain or other nervous system problem?   No   During the past year, have you received a transfusion of blood or blood     products, or been given immune (gamma) globulin or antiviral drug?   No   For women: Are you pregnant or is there a chance you could become        pregnant during the next month?   No   Have you received any vaccinations in the past 4 weeks?   No     Immunization questionnaire was positive for at least one answer.  Ok per guidelines for Shingrix #2.         Patient instructed to remain in clinic for 15 minutes afterwards, and to report any adverse reaction to me immediately.       Screening performed by Marlo Easton on 12/18/2018 at 4:21 PM.

## 2018-12-22 DIAGNOSIS — E10.319 TYPE 1 DIABETES MELLITUS WITH RETINOPATHY OF BOTH EYES, MACULAR EDEMA PRESENCE UNSPECIFIED, UNSPECIFIED RETINOPATHY SEVERITY (H): ICD-10-CM

## 2018-12-22 NOTE — LETTER
[facility]  [address] [place] [address]  Phone: [phone]       December 24, 2018         Rangel Singh  [address]  [facility] [address]            Dear Rangel:    We are concerned about your health care.  We recently provided you with medication refills.  Many medications require routine follow-up with your doctor and routine labs.     Your prescription(s) have been refilled for 30 days so you may have time for the above noted follow-up. Please call to schedule soon so we can assure you have an appointment before your next refills are needed.    Thank you,      Ana Laura Ji DO / tenisha

## 2018-12-24 RX ORDER — SYRING-NEEDL,DISP,INSUL,0.3 ML 31GX15/64"
SYRINGE, EMPTY DISPOSABLE MISCELLANEOUS
Qty: 100 EACH | Refills: 6 | Status: SHIPPED | OUTPATIENT
Start: 2018-12-24 | End: 2019-01-08

## 2018-12-24 RX ORDER — INSULIN GLARGINE 100 [IU]/ML
INJECTION, SOLUTION SUBCUTANEOUS
Qty: 10 ML | Refills: 0 | Status: SHIPPED | OUTPATIENT
Start: 2018-12-24 | End: 2019-01-08

## 2018-12-24 NOTE — TELEPHONE ENCOUNTER
"Requested Prescriptions   Pending Prescriptions Disp Refills     BD VEO INSULIN SYRINGE U/F 31G X 15/64\" 0.3 ML [Pharmacy Med Name: BD INS SYR 0.3/31G/6MM MIS]  Last Written Prescription Date:  12/22/18  Last Fill Quantity: 100,  # refills: 9   Last office visit: 12/18/2018 with prescribing provider:  Garrison Moctezuma Office Visit:     100 each 9     Sig: USE 1 SYRINGE 4 TIMES DAILY    Diabetic Supplies Protocol Passed - 12/22/2018 10:10 AM       Passed - Patient is 18 years of age or older       Passed - Recent (6 mo) or future (30 days) visit within the authorizing provider's specialty    Patient had office visit in the last 6 months or has a visit in the next 30 days with authorizing provider.  See \"Patient Info\" tab in inbasket, or \"Choose Columns\" in Meds & Orders section of the refill encounter.            insulin lispro (HUMALOG) 100 UNIT/ML vial [Pharmacy Med Name: HUMALOG 100UNIT/ML  INJ]  Last Written Prescription Date:  11/21/17  Last Fill Quantity: 1,  # refills: 11   Last office visit: 12/18/2018 with prescribing provider:  Garrison   Future Office Visit:      11     Sig: INJECT 2-3 UNITS IN THE MORNING, 2-3 AT LUNCH, AND 2-4 IN THE EVENING AND SLIDING SCALE. ESTIMATED MAXIMUM UNITS DAILY IS 25 UNITS PER DAY    Short Acting Insulin Protocol Failed - 12/22/2018 10:10 AM       Failed - Blood pressure less than 140/90 in past 6 months    BP Readings from Last 3 Encounters:   06/07/18 106/62   03/02/18 109/57   02/26/18 112/68                Failed - LDL on file in past 12 months    Recent Labs   Lab Test 11/18/17  0657   LDL 80  96            Failed - Serum creatinine on file in past 12 months    Recent Labs   Lab Test 11/18/17  0657   CR 0.76            Failed - HgbA1C in past 3 or 6 months    If HgbA1C is 8 or greater, it needs to be on file within the past 3 months.  If less than 8, must be on file within the past 6 months.     Recent Labs   Lab Test 05/25/18  1453   A1C 6.0*            Passed - " "Microalbumin on file in past 12 months    Recent Labs   Lab Test 11/18/17  0658   MICROL 17   UMALCR 10.78            Passed - Patient is age 18 or older       Passed - Recent (6 mo) or future (30 days) visit within the authorizing provider's specialty    Patient had office visit in the last 6 months or has a visit in the next 30 days with authorizing provider or within the authorizing provider's specialty.  See \"Patient Info\" tab in inbasket, or \"Choose Columns\" in Meds & Orders section of the refill encounter.            LANTUS VIAL 100 UNIT/ML soln [Pharmacy Med Name: LANTUS VIAL 100U/ML INJ]  Last Written Prescription Date:  11/21/17  Last Fill Quantity: 10 mL,  # refills: 11   Last office visit: 12/18/2018 with prescribing provider:  Garrison   Future Office Visit:      11     Sig: INJECT 2 TO 5 UNITS SUBCUTANEOUSLY AT BEDTIME    Long Acting Insulin Protocol Failed - 12/22/2018 10:10 AM       Failed - Blood pressure less than 140/90 in past 6 months    BP Readings from Last 3 Encounters:   06/07/18 106/62   03/02/18 109/57   02/26/18 112/68                Failed - LDL on file in past 12 months    Recent Labs   Lab Test 11/18/17  0657   LDL 80  96            Failed - Serum creatinine on file in past 12 months    Recent Labs   Lab Test 11/18/17  0657   CR 0.76            Failed - HgbA1C in past 3 or 6 months    If HgbA1C is 8 or greater, it needs to be on file within the past 3 months.  If less than 8, must be on file within the past 6 months.     Recent Labs   Lab Test 05/25/18  1453   A1C 6.0*            Passed - Microalbumin on file in past 12 months    Recent Labs   Lab Test 11/18/17  0658   MICROL 17   UMALCR 10.78            Passed - Patient is age 18 or older       Passed - Recent (6 mo) or future (30 days) visit within the authorizing provider's specialty    Patient had office visit in the last 6 months or has a visit in the next 30 days with authorizing provider or within the authorizing provider's " "specialty.  See \"Patient Info\" tab in inbasket, or \"Choose Columns\" in Meds & Orders section of the refill encounter.              "

## 2018-12-28 DIAGNOSIS — E10.319 TYPE 1 DIABETES MELLITUS WITH RETINOPATHY OF BOTH EYES, MACULAR EDEMA PRESENCE UNSPECIFIED, UNSPECIFIED RETINOPATHY SEVERITY (H): ICD-10-CM

## 2018-12-28 RX ORDER — GLUCOSAM/CHON-MSM1/C/MANG/BOSW 500-416.6
1 TABLET ORAL 4 TIMES DAILY
Qty: 100 EACH | Refills: 0 | Status: SHIPPED | OUTPATIENT
Start: 2018-12-28 | End: 2019-01-08

## 2018-12-28 NOTE — TELEPHONE ENCOUNTER
"Requested Prescriptions   Pending Prescriptions Disp Refills     TRUEPLUS LANCETS 33G MISC 300 each 3    Diabetic Supplies Protocol Passed - 12/28/2018 10:53 AM       Passed - Patient is 18 years of age or older       Passed - Recent (6 mo) or future (30 days) visit within the authorizing provider's specialty    Patient had office visit in the last 6 months or has a visit in the next 30 days with authorizing provider.  See \"Patient Info\" tab in inbasket, or \"Choose Columns\" in Meds & Orders section of the refill encounter.          Last Written Prescription Date:  12/27/17  Last Fill Quantity: 300,  # refills: 3   Last office visit: 12/18/2018 with prescribing provider:     Future Office Visit:   Next 5 appointments (look out 90 days)    Jan 08, 2019  9:20 AM CST  SHORT with Ana Laura Ji DO  [facility] [address] [id number]  [phone]           "

## 2018-12-28 NOTE — TELEPHONE ENCOUNTER
Lancets approved per [facility] Refill Protocol.  6/7/18 OV notes indicates pt checks BG 4x/day.  Scheduled to see Dr. Ana Laura Ji on 1/8/19.    Leah BUSTILLO RN

## 2018-12-29 DIAGNOSIS — E10.3293 TYPE 1 DIABETES MELLITUS WITH MILD NONPROLIFERATIVE RETINOPATHY OF BOTH EYES WITHOUT MACULAR EDEMA (H): Chronic | ICD-10-CM

## 2018-12-29 LAB
ANION GAP SERPL CALCULATED.3IONS-SCNC: 5 MMOL/L (ref 3–14)
BUN SERPL-MCNC: 25 MG/DL (ref 7–30)
CALCIUM SERPL-MCNC: 8.6 MG/DL (ref 8.5–10.1)
CHLORIDE SERPL-SCNC: 103 MMOL/L (ref 94–109)
CHOLEST SERPL-MCNC: 179 MG/DL
CO2 SERPL-SCNC: 29 MMOL/L (ref 20–32)
CREAT SERPL-MCNC: 0.77 MG/DL (ref 0.66–1.25)
CREAT UR-MCNC: 82 MG/DL
GFR SERPL CREATININE-BSD FRML MDRD: >90 ML/MIN/{1.73_M2}
GLUCOSE SERPL-MCNC: 98 MG/DL (ref 70–99)
HBA1C MFR BLD: 6.7 % (ref 0–5.6)
HDLC SERPL-MCNC: 60 MG/DL
LDLC SERPL CALC-MCNC: 111 MG/DL
MICROALBUMIN UR-MCNC: 9 MG/L
MICROALBUMIN/CREAT UR: 10.69 MG/G CR (ref 0–17)
NONHDLC SERPL-MCNC: 119 MG/DL
POTASSIUM SERPL-SCNC: 5 MMOL/L (ref 3.4–5.3)
SODIUM SERPL-SCNC: 137 MMOL/L (ref 133–144)
TRIGL SERPL-MCNC: 40 MG/DL
TSH SERPL DL<=0.005 MIU/L-ACNC: 1.82 MU/L (ref 0.4–4)

## 2018-12-29 PROCEDURE — 82043 UR ALBUMIN QUANTITATIVE: CPT | Performed by: INTERNAL MEDICINE

## 2018-12-29 PROCEDURE — 84443 ASSAY THYROID STIM HORMONE: CPT | Performed by: INTERNAL MEDICINE

## 2018-12-29 PROCEDURE — 83036 HEMOGLOBIN GLYCOSYLATED A1C: CPT | Performed by: INTERNAL MEDICINE

## 2018-12-29 PROCEDURE — 80061 LIPID PANEL: CPT | Performed by: INTERNAL MEDICINE

## 2018-12-29 PROCEDURE — 80048 BASIC METABOLIC PNL TOTAL CA: CPT | Performed by: INTERNAL MEDICINE

## 2018-12-29 PROCEDURE — 36415 COLL VENOUS BLD VENIPUNCTURE: CPT | Performed by: INTERNAL MEDICINE

## 2019-01-08 ENCOUNTER — OFFICE VISIT (OUTPATIENT)
Dept: FAMILY MEDICINE | Facility: CLINIC | Age: 54
End: 2019-01-08
Payer: COMMERCIAL

## 2019-01-08 VITALS
TEMPERATURE: 96.7 F | HEIGHT: 70 IN | HEART RATE: 59 BPM | WEIGHT: 172.7 LBS | BODY MASS INDEX: 24.73 KG/M2 | DIASTOLIC BLOOD PRESSURE: 72 MMHG | OXYGEN SATURATION: 98 % | SYSTOLIC BLOOD PRESSURE: 120 MMHG

## 2019-01-08 DIAGNOSIS — E10.319 TYPE 1 DIABETES MELLITUS WITH RETINOPATHY OF BOTH EYES, MACULAR EDEMA PRESENCE UNSPECIFIED, UNSPECIFIED RETINOPATHY SEVERITY (H): Primary | ICD-10-CM

## 2019-01-08 DIAGNOSIS — E78.5 HYPERLIPIDEMIA LDL GOAL <100: Chronic | ICD-10-CM

## 2019-01-08 DIAGNOSIS — K64.4 ANAL SKIN TAG: ICD-10-CM

## 2019-01-08 DIAGNOSIS — I10 ESSENTIAL HYPERTENSION: ICD-10-CM

## 2019-01-08 PROCEDURE — 99214 OFFICE O/P EST MOD 30 MIN: CPT | Performed by: INTERNAL MEDICINE

## 2019-01-08 RX ORDER — LISINOPRIL 10 MG/1
10 TABLET ORAL DAILY
Qty: 90 TABLET | Refills: 3 | Status: SHIPPED | OUTPATIENT
Start: 2019-01-08

## 2019-01-08 RX ORDER — ALBUTEROL SULFATE 90 UG/1
2 AEROSOL, METERED RESPIRATORY (INHALATION) EVERY 6 HOURS PRN
Qty: 1 INHALER | Refills: 0 | Status: CANCELLED | OUTPATIENT
Start: 2019-01-08

## 2019-01-08 RX ORDER — ATORVASTATIN CALCIUM 10 MG/1
10 TABLET, FILM COATED ORAL EVERY OTHER DAY
Qty: 45 TABLET | Refills: 3 | Status: SHIPPED | OUTPATIENT
Start: 2019-01-08 | End: 2022-10-17

## 2019-01-08 RX ORDER — GLUCOSAM/CHON-MSM1/C/MANG/BOSW 500-416.6
1 TABLET ORAL 4 TIMES DAILY
Qty: 100 EACH | Refills: 11 | Status: SHIPPED | OUTPATIENT
Start: 2019-01-08

## 2019-01-08 RX ORDER — INSULIN GLARGINE 100 [IU]/ML
INJECTION, SOLUTION SUBCUTANEOUS
Qty: 10 ML | Refills: 11 | Status: SHIPPED | OUTPATIENT
Start: 2019-01-08

## 2019-01-08 ASSESSMENT — MIFFLIN-ST. JEOR: SCORE: 1628.58

## 2019-01-08 NOTE — PROGRESS NOTES
SUBJECTIVE:   Rangel Singh is a 53 year old male who presents to clinic today for the following health issues:    Diabetes Follow-up    Patient is checking blood sugars: four times daily.  Mornin-110 lunch:  dinner: 100-120 and before he goes to bed: varies from , sometimes higher     Diabetic concerns: None     Symptoms of hypoglycemia (low blood sugar): none     Paresthesias (numbness or burning in feet) or sores: No     Date of last diabetic eye exam: UTD     He has glucagon. He doesn't wear medical alert bracelet.  His co-workers are aware    He does have bedtime snack consistently    Humalog 3 with breakfast/lunch, 5-6 units with dinner and Lantus 4 units HS.  Not running, so using higher amounts. Also attributes his higher blood sugar due to holidays and not exercising like he would like.    He prefers vials over insulin pens    He has DEXCOM due to hypoglycemia unawareness    He reports he had 10-12 hypoglycemia episodes in Dec due to using more insulin.  He feels it takes a long time for the insulin to work and then his blood sugar will drop too low    He is also working 12 hour days which he thinks affects his blood sugar.    We discussed meeting with diabetes RN today and he declined.    He does get syringes with half units, but most recently did not.  He will check with pharmacy on this.    Diabetes Management Resources    Hyperlipidemia Follow-Up      Rate your low fat/cholesterol diet?: not monitoring fat    Taking statin?  Yes, no muscle aches from statin    Other lipid medications/supplements?:  none    Hypertension Follow-up      Outpatient blood pressures are being checked at home.  Results are 120/75, 110/65     Low Salt Diet: not monitoring salt    BP Readings from Last 2 Encounters:   18 106/62   18 109/57     Hemoglobin A1C (%)   Date Value   2018 6.7 (H)   2018 6.0 (H)     LDL Cholesterol Calculated (mg/dL)   Date Value   2018 111 (H)  "  11/18/2017 80     LDL Cholesterol Direct (mg/dL)   Date Value   11/18/2017 96       Amount of exercise or physical activity: 4-5 days/week for an average of greater than 60 minutes    Problems taking medications regularly: No    Medication side effects: none    Diet: regular (no restrictions)      Family history of A-fib - both parents and bro.    Personal history of anal polyp: 10+ years ago.  He reports it was removed, but it has recurred and wants it removed.    HCM: declines flu shot      Current Outpatient Medications   Medication Sig Dispense Refill     ASPIRIN 81 MG OR TABS 1 tab po QD (Once per day) 100 3     atorvastatin (LIPITOR) 10 MG tablet Take 1 tablet (10 mg) by mouth every other day 45 tablet 3     blood glucose (NO BRAND SPECIFIED) test strip Check BS 4-5x/day.  Dispense 3 month supply. Accu-Check Compact Plus. 510 each 11     glucagon (GLUCAGON EMERGENCY) 1 MG kit Inject 1 mg into the muscle once for 1 dose 1 mg 11     insulin glargine (LANTUS VIAL) 100 UNIT/ML vial INJECT 2 TO 5 UNITS SUBCUTANEOUSLY AT BEDTIME 10 mL 11     insulin lispro (HUMALOG) 100 UNIT/ML vial INJECT 2-3 UNITS IN THE MORNING, 2-3 AT LUNCH, AND 2-4 IN THE EVENING AND SLIDING SCALE. ESTIMATED MAXIMUM UNITS DAILY IS 25 UNITS PER DAY 10 mL 11     insulin syringe-needle U-100 (BD VEO INSULIN SYRINGE U/F) 31G X 15/64\" 0.3 ML Use 4 syringes daily or as directed. 120 each 11     insulin syringes, disposable, U-100 0.3 ML MISC 1 Device 4 times daily 100 each 11     lisinopril (PRINIVIL/ZESTRIL) 10 MG tablet Take 1 tablet (10 mg) by mouth daily 90 tablet 3     TRUEPLUS LANCETS 33G MISC 1 Device 4 times daily 100 each 11     albuterol (PROAIR HFA/PROVENTIL HFA/VENTOLIN HFA) 108 (90 BASE) MCG/ACT Inhaler Inhale 2 puffs into the lungs every 6 hours as needed for shortness of breath / dyspnea or wheezing (Patient not taking: Reported on 6/7/2018) 1 Inhaler 0     order for DME Equipment being ordered: Continue glucose monitor - Dexcom G4 - " ", transmitter, sensor 1 each prn       Reviewed and updated as needed this visit by clinical staff       Reviewed and updated as needed this visit by Provider         ROS:  Constitutional, HEENT, cardiovascular, pulmonary, gi and gu systems are negative, except as otherwise noted.    OBJECTIVE:     /72 (BP Location: Right arm, Patient Position: Sitting, Cuff Size: Adult Regular)   Pulse 59   Temp 96.7  F (35.9  C) (Tympanic)   Ht 1.784 m (5' 10.25\")   Wt 78.3 kg (172 lb 11.2 oz)   SpO2 98%   BMI 24.60 kg/m    Body mass index is 24.6 kg/m .  GENERAL APPEARANCE: healthy, alert and no distress  RESP: lungs clear to auscultation - no rales, rhonchi or wheezes  CV: regular rates and rhythm, normal S1 S2, no S3 or S4 and no murmur, click or rub  DIABETIC FOOT EXAM: normal DP and PT pulses, no trophic changes or ulcerative lesions and normal sensory exam    Diagnostic Test Results:  Results for orders placed or performed in visit on 12/29/18   Hemoglobin A1c   Result Value Ref Range    Hemoglobin A1C 6.7 (H) 0 - 5.6 %   **TSH with free T4 reflex FUTURE 6mo   Result Value Ref Range    TSH 1.82 0.40 - 4.00 mU/L   Basic metabolic panel   Result Value Ref Range    Sodium 137 133 - 144 mmol/L    Potassium 5.0 3.4 - 5.3 mmol/L    Chloride 103 94 - 109 mmol/L    Carbon Dioxide 29 20 - 32 mmol/L    Anion Gap 5 3 - 14 mmol/L    Glucose 98 70 - 99 mg/dL    Urea Nitrogen 25 7 - 30 mg/dL    Creatinine 0.77 0.66 - 1.25 mg/dL    GFR Estimate >90 >60 mL/min/[1.73_m2]    GFR Estimate If Black >90 >60 mL/min/[1.73_m2]    Calcium 8.6 8.5 - 10.1 mg/dL   Lipid panel reflex to direct LDL Fasting   Result Value Ref Range    Cholesterol 179 <200 mg/dL    Triglycerides 40 <150 mg/dL    HDL Cholesterol 60 >39 mg/dL    LDL Cholesterol Calculated 111 (H) <100 mg/dL    Non HDL Cholesterol 119 <130 mg/dL   Albumin Random Urine Quantitative with Creat Ratio   Result Value Ref Range    Creatinine Urine 82 mg/dL    Albumin Urine mg/L " "9 mg/L    Albumin Urine mg/g Cr 10.69 0 - 17 mg/g Cr       ASSESSMENT/PLAN:       1. 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 -sleep slightly higher A1c due to holidays and not exercising.  He is also having frequent hypoglycemia.  Discussed referral to diabetes education, he declined.  Continue with meds unchanged.  - Hemoglobin A1c; Future  - atorvastatin (LIPITOR) 10 MG tablet; Take 1 tablet (10 mg) by mouth every other day  Dispense: 45 tablet; Refill: 3  - blood glucose (NO BRAND SPECIFIED) test strip; Check BS 4-5x/day.  Dispense 3 month supply. Accu-Check Compact Plus.  Dispense: 510 each; Refill: 11  - glucagon (GLUCAGON EMERGENCY) 1 MG kit; Inject 1 mg into the muscle once for 1 dose  Dispense: 1 mg; Refill: 11  - insulin lispro (HUMALOG) 100 UNIT/ML vial; INJECT 2-3 UNITS IN THE MORNING, 2-3 AT LUNCH, AND 2-4 IN THE EVENING AND SLIDING SCALE. ESTIMATED MAXIMUM UNITS DAILY IS 25 UNITS PER DAY  Dispense: 10 mL; Refill: 11  - insulin syringes, disposable, U-100 0.3 ML MISC; 1 Device 4 times daily  Dispense: 100 each; Refill: 11  - insulin glargine (LANTUS VIAL) 100 UNIT/ML vial; INJECT 2 TO 5 UNITS SUBCUTANEOUSLY AT BEDTIME  Dispense: 10 mL; Refill: 11  - lisinopril (PRINIVIL/ZESTRIL) 10 MG tablet; Take 1 tablet (10 mg) by mouth daily  Dispense: 90 tablet; Refill: 3  - TRUEPLUS LANCETS 33G MISC; 1 Device 4 times daily  Dispense: 100 each; Refill: 11  - insulin syringe-needle U-100 (BD VEO INSULIN SYRINGE U/F) 31G X 15/64\" 0.3 ML; Use 4 syringes daily or as directed.  Dispense: 120 each; Refill: 11  - Hemoglobin A1c; Future  - Hemoglobin A1c; Standing    2. Hyperlipidemia LDL goal <100  - stable, refill provided  - atorvastatin (LIPITOR) 10 MG tablet; Take 1 tablet (10 mg) by mouth every other day  Dispense: 45 tablet; Refill: 3    3. Essential hypertension - stable    4. Anal skin tag requests removal  - GENERAL SURG ADULT " REFERRAL      Ana Laura Ji, [facility]

## 2019-01-08 NOTE — PATIENT INSTRUCTIONS
1. Referral to General Surgery for anal skin tag  2. All ordered renewed  3. Return to clinic 6 months, non-fasting labs prior.

## 2019-01-09 ENCOUNTER — OFFICE VISIT (OUTPATIENT)
Dept: SURGERY | Facility: CLINIC | Age: 54
End: 2019-01-09
Payer: COMMERCIAL

## 2019-01-09 VITALS
HEART RATE: 64 BPM | BODY MASS INDEX: 24.62 KG/M2 | DIASTOLIC BLOOD PRESSURE: 55 MMHG | WEIGHT: 172 LBS | TEMPERATURE: 98.5 F | SYSTOLIC BLOOD PRESSURE: 122 MMHG | HEIGHT: 70 IN

## 2019-01-09 DIAGNOSIS — K64.4 SKIN TAG OF ANUS: Primary | ICD-10-CM

## 2019-01-09 PROCEDURE — 46220 EXCISE ANAL EXT TAG/PAPILLA: CPT | Performed by: SURGERY

## 2019-01-09 ASSESSMENT — MIFFLIN-ST. JEOR: SCORE: 1625.41

## 2019-01-09 NOTE — LETTER
1/9/2019         RE: Rangel Singh  [id number]  [facility] [address]        Dear Colleague,    Thank you for referring your patient, Rangel Singh, to the [facility]. Please see a copy of my visit note below.    53-year-old male here for evaluation of perianal skin tag.  Patient has had these in the past and have been excised in clinic without difficulty.  Patient reports this 1 has shown up recently and is currently nonpainful.    Exam:    Posterior anal margin with a large skin tag with pedunculated base.    Procedure: Area wiped down with alcohol prep pad.  1% lidocaine with epinephrine anesthetize the base of the skin tag and tag removed using a 15 blade scalpel.  Bleeding controlled with direct pressure.  Wound covered with dry gauze.    Assessment and plan: Status post removal of anal skin tag.  Patient advised to keep area clean and dry.  Bleeding should subside over the next few days.  Patient sent home with gauze.  Follow-up again as necessary.    Eduardo Naranjo MD     Again, thank you for allowing me to participate in the care of your patient.        Sincerely,        Eduardo Naranjo MD

## 2019-01-09 NOTE — NURSING NOTE
"Initial /55 (BP Location: Right arm, Patient Position: Sitting, Cuff Size: Adult Regular)   Pulse 64   Temp 98.5  F (36.9  C) (Oral)   Ht 1.784 m (5' 10.25\")   Wt 78 kg (172 lb)   BMI 24.50 kg/m   Estimated body mass index is 24.5 kg/m  as calculated from the following:    Height as of this encounter: 1.784 m (5' 10.25\").    Weight as of this encounter: 78 kg (172 lb). .    Dorinda Douglas MA    "

## 2019-01-09 NOTE — PROGRESS NOTES
53-year-old male here for evaluation of perianal skin tag.  Patient has had these in the past and have been excised in clinic without difficulty.  Patient reports this 1 has shown up recently and is currently nonpainful.    Exam:    Posterior anal margin with a large skin tag with pedunculated base.    Procedure: Area wiped down with alcohol prep pad.  1% lidocaine with epinephrine anesthetize the base of the skin tag and tag removed using a 15 blade scalpel.  Bleeding controlled with direct pressure.  Wound covered with dry gauze.    Assessment and plan: Status post removal of anal skin tag.  Patient advised to keep area clean and dry.  Bleeding should subside over the next few days.  Patient sent home with gauze.  Follow-up again as necessary.    Eduardo Naranjo MD

## 2019-02-04 ENCOUNTER — TELEPHONE (OUTPATIENT)
Dept: FAMILY MEDICINE | Facility: CLINIC | Age: 54
End: 2019-02-04

## 2019-02-05 NOTE — TELEPHONE ENCOUNTER
Prior Authorization Retail Medication Request    Medication/Dose: humalog  ICD code (if different than what is on RX):    Previously Tried and Failed:  Lantus; lantus solstar; humalog kwikpen; humulin unit(s); novolog;   Rationale:  Patient has used since 2011    Insurance Name:  [employer] [phone]  Insurance ID:  [id number]      Pharmacy Information (if different than what is on RX)  Name:    Phone:

## 2019-02-09 NOTE — TELEPHONE ENCOUNTER
PA Initiation    Medication: humalog  Insurance Company: [employer] ([facility]) - Phone [phone] Fax [phone]  Pharmacy Filling the Rx: [facility] [address] [place] [address]  Filling Pharmacy Phone: [phone]  Filling Pharmacy Fax:    Start Date: 2/9/2019    Central Prior Authorization Team   Phone: [phone]

## 2019-02-11 NOTE — TELEPHONE ENCOUNTER
Prior Authorization Not Needed per Insurance    Medication: humalog  Insurance Company: [employer] ([facility]) - Phone [phone] Fax [phone]  Expected CoPay:      Pharmacy Filling the Rx: [facility] [address] [place] [address]  Pharmacy Notified: Yes per pharmacy no PA is needed it is just a refill too soon til 3/3/19  Patient Notified: Yes

## 2019-03-04 ENCOUNTER — NURSE TRIAGE (OUTPATIENT)
Dept: NURSING | Facility: CLINIC | Age: 54
End: 2019-03-04

## 2019-03-04 ENCOUNTER — OFFICE VISIT (OUTPATIENT)
Dept: URGENT CARE | Facility: URGENT CARE | Age: 54
End: 2019-03-04
Payer: COMMERCIAL

## 2019-03-04 VITALS
TEMPERATURE: 99.7 F | WEIGHT: 166 LBS | RESPIRATION RATE: 18 BRPM | DIASTOLIC BLOOD PRESSURE: 69 MMHG | HEART RATE: 77 BPM | BODY MASS INDEX: 23.65 KG/M2 | OXYGEN SATURATION: 99 % | SYSTOLIC BLOOD PRESSURE: 126 MMHG

## 2019-03-04 DIAGNOSIS — E10.9 TYPE 1 DIABETES MELLITUS WITHOUT COMPLICATION (H): ICD-10-CM

## 2019-03-04 DIAGNOSIS — A08.4 VIRAL GASTROENTERITIS: Primary | ICD-10-CM

## 2019-03-04 PROCEDURE — 99214 OFFICE O/P EST MOD 30 MIN: CPT | Performed by: PHYSICIAN ASSISTANT

## 2019-03-04 RX ORDER — ONDANSETRON 4 MG/1
4 TABLET, ORALLY DISINTEGRATING ORAL EVERY 8 HOURS PRN
Qty: 20 TABLET | Refills: 1 | Status: SHIPPED | OUTPATIENT
Start: 2019-03-04 | End: 2021-06-14

## 2019-03-04 ASSESSMENT — ENCOUNTER SYMPTOMS
SINUS PRESSURE: 0
MYALGIAS: 0
EYE DISCHARGE: 0
VOMITING: 1
ABDOMINAL PAIN: 0
COUGH: 0
SINUS PAIN: 0
UNEXPECTED WEIGHT CHANGE: 0
RHINORRHEA: 0
CONSTIPATION: 0
EYE ITCHING: 0
ARTHRALGIAS: 0
PALPITATIONS: 0
EYE REDNESS: 0
EYE PAIN: 0
FATIGUE: 0
WHEEZING: 0
SORE THROAT: 0
FEVER: 1
SHORTNESS OF BREATH: 0
CHILLS: 0
DIARRHEA: 1
NAUSEA: 1

## 2019-03-04 NOTE — LETTER
[facility]  [address] [address]  Phone: [phone]  Fax: [phone]    March 4, 2019        Rangel Singh  [address] [facility] [address]          To whom it may concern:    RE: Rangel Singh    Patient was seen and treated today at our clinic and missed work 03/04/19 through 03/08/19      Please contact me for questions or concerns.      Sincerely,        Nicole Hannon PA-C

## 2019-03-04 NOTE — TELEPHONE ENCOUNTER
"Pt reports vomiting since 12AM today, diarrhea started at 6AM but has subsided.  Pt continues to vomit when he attempts fluid intake.  Chills present, unknown fever status.  BS is 100.      Disposition:  ED.  He verbalized understanding and had no further questions.     Cecilia Haynes RN/FNA    Reason for Disposition    [1] SEVERE vomiting (e.g., 6 or more times/day) AND [2] present > 8 hours    Additional Information    Negative: Shock suspected (e.g., cold/pale/clammy skin, too weak to stand, low BP, rapid pulse)    Negative: Difficult to awaken or acting confused  (e.g., disoriented, slurred speech)    Negative: Sounds like a life-threatening emergency to the triager    Negative: Vomiting occurs only while coughing    Negative: [1] Pregnant < 20 Weeks AND [2] nausea/vomiting began in early pregnancy (i.e., 4-8 weeks pregnant)    Negative: Chest pain    Negative: [1] SEVERE headache AND [2] similar to prior migraines    Negative: [1] Vomiting AND [2] contains red blood or black (\"coffee ground\") material  (Exception: few red streaks in vomit that only happened once)    Negative: Severe pain in one eye    Negative: Recent head injury (within last 3 days)    Negative: Recent abdominal injury (within last 3 days)    Negative: [1] Insulin-dependent diabetes (Type I) AND [2] glucose > 400 mg/dl (22 mmol/l)    Protocols used: VOMITING-ADULT-      "

## 2019-03-05 NOTE — PROGRESS NOTES
SUBJECTIVE:   Rangel Singh is a 53 year old male presenting with a chief complaint of   Chief Complaint   Patient presents with     Vomiting     since yesterday, unable to keep anything down, diarrhea- 7x in last 24 hours, vomiting- 6x in last 24 hours, low grade fever, can't keep water down, body aches        He is an established patient of [facility].    Gastro    Onset of symptoms was 1 day(s) ago.  Course of illness is same.    Severity moderate  Current and Associated symptoms:  vomiting, diarrhea and fever  Aggravating factors: eating.    Alleviating factors:nothing  Diarrhea: Yes  7 stools/day and is persisting  Stools: watery  Vomitting: Yes  6x/day  Appetite: decreased  Risk factors: sick contacts, wife has similar symptoms.     Patient has T1DM, has been checking blood sugar regularly.       Review of Systems   Constitutional: Positive for fever. Negative for chills, fatigue and unexpected weight change.   HENT: Negative for congestion, ear pain, rhinorrhea, sinus pressure, sinus pain and sore throat.    Eyes: Negative for pain, discharge, redness and itching.   Respiratory: Negative for cough, shortness of breath and wheezing.    Cardiovascular: Negative for chest pain, palpitations and leg swelling.   Gastrointestinal: Positive for diarrhea, nausea and vomiting. Negative for abdominal pain and constipation.   Musculoskeletal: Negative for arthralgias and myalgias.   Skin: Negative for rash.       Past Medical History:   Diagnosis Date     CELLULITIS NOS     left danny-auricular 2004     Facial cellulitis 8/11/2014     Family History   Problem Relation Age of Onset     DAYNAAJAIR Father         50s      Hypertension Father      Atrial fibrillation Father      Hypertension Mother      Cerebrovascular Disease Mother      Atrial fibrillation Mother      Cancer Maternal Grandfather         ?throat/lung cancer     Cerebrovascular Disease Maternal Grandmother      Arthritis Sister         RA     ADRIANA Brother   "    Atrial fibrillation Brother      Circulatory Other         2 cousins on maternal side .w anurysms     Cancer - colorectal No family hx of      Diabetes No family hx of      Current Outpatient Medications   Medication Sig Dispense Refill     ASPIRIN 81 MG OR TABS 1 tab po QD (Once per day) 100 3     atorvastatin (LIPITOR) 10 MG tablet Take 1 tablet (10 mg) by mouth every other day 45 tablet 3     blood glucose (NO BRAND SPECIFIED) test strip Check BS 4-5x/day.  Dispense 3 month supply. Accu-Check Compact Plus. 510 each 11     insulin glargine (LANTUS VIAL) 100 UNIT/ML vial INJECT 2 TO 5 UNITS SUBCUTANEOUSLY AT BEDTIME 10 mL 11     insulin lispro (HUMALOG) 100 UNIT/ML vial INJECT 2-3 UNITS IN THE MORNING, 2-3 AT LUNCH, AND 2-4 IN THE EVENING AND SLIDING SCALE. ESTIMATED MAXIMUM UNITS DAILY IS 25 UNITS PER DAY 10 mL 11     insulin syringe-needle U-100 (BD VEO INSULIN SYRINGE U/F) 31G X 15/64\" 0.3 ML Use 4 syringes daily or as directed. 120 each 11     insulin syringes, disposable, U-100 0.3 ML MISC 1 Device 4 times daily 100 each 11     lisinopril (PRINIVIL/ZESTRIL) 10 MG tablet Take 1 tablet (10 mg) by mouth daily 90 tablet 3     ondansetron (ZOFRAN-ODT) 4 MG ODT tab Take 1 tablet (4 mg) by mouth every 8 hours as needed for nausea 20 tablet 1     order for DME Equipment being ordered: Continue glucose monitor - Dexcom G4 - , transmitter, sensor 1 each prn     TRUEPLUS LANCETS 33G MISC 1 Device 4 times daily 100 each 11     albuterol (PROAIR HFA/PROVENTIL HFA/VENTOLIN HFA) 108 (90 BASE) MCG/ACT Inhaler Inhale 2 puffs into the lungs every 6 hours as needed for shortness of breath / dyspnea or wheezing (Patient not taking: Reported on 6/7/2018) 1 Inhaler 0     glucagon (GLUCAGON EMERGENCY) 1 MG kit Inject 1 mg into the muscle once for 1 dose 1 mg 11     Social History     Tobacco Use     Smoking status: Never Smoker     Smokeless tobacco: Never Used   Substance Use Topics     Alcohol use: Yes     Comment: " occ. 2-3 times a month       OBJECTIVE  /69   Pulse 77   Temp 99.7  F (37.6  C) (Tympanic)   Resp 18   Wt 75.3 kg (166 lb)   SpO2 99%   BMI 23.65 kg/m      Physical Exam   Constitutional: He appears well-developed and well-nourished. No distress.   HENT:   Head: Normocephalic and atraumatic.   Right Ear: Tympanic membrane and ear canal normal.   Left Ear: Tympanic membrane and ear canal normal.   Mouth/Throat: Oropharynx is clear and moist.   Eyes: Conjunctivae are normal. Pupils are equal, round, and reactive to light.   Cardiovascular: Normal rate and regular rhythm.   Pulmonary/Chest: Effort normal and breath sounds normal.   Abdominal: Soft. Normal appearance and bowel sounds are normal. There is no tenderness.   Skin: Skin is warm and dry. No rash noted.   Psychiatric: He has a normal mood and affect. His behavior is normal.       Labs:  No results found for this or any previous visit (from the past 24 hour(s)).    X-Ray was not done.    ASSESSMENT:      ICD-10-CM    1. Viral gastroenteritis A08.4 ondansetron (ZOFRAN-ODT) 4 MG ODT tab   2. Type 1 diabetes mellitus without complication (H) E10.9         Medical Decision Making:    Differential Diagnosis:  Gastro: viral gastroenteritis and food poisoning    Serious Comorbid Conditions:  Adult:  Diabetes    PLAN:    Gastro: This is likely a viral illness. Continue with supportive care. Get plenty of rest and push fluids. Advance diet as tolerated. Prescribed Zofran ODT 4mg every 8-12hrs as needed for nausea. Can also try over the counter Imodium as needed for diarrhea. Follow up as needed      T1DM: continue to monitor blood sugar closely and watch for signs of dehydration.     Followup:    If not improving or if condition worsens, follow up with your Primary Care Provider    There are no Patient Instructions on file for this visit.

## 2019-03-06 ENCOUNTER — HOSPITAL ENCOUNTER (EMERGENCY)
Facility: CLINIC | Age: 54
Discharge: HOME | End: 2019-03-06
Attending: EMERGENCY MEDICINE | Admitting: EMERGENCY MEDICINE
Payer: COMMERCIAL

## 2019-03-06 ENCOUNTER — TELEPHONE (OUTPATIENT)
Dept: EMERGENCY MEDICINE | Facility: CLINIC | Age: 54
End: 2019-03-06

## 2019-03-06 VITALS
HEIGHT: 69 IN | DIASTOLIC BLOOD PRESSURE: 77 MMHG | BODY MASS INDEX: 24.59 KG/M2 | RESPIRATION RATE: 20 BRPM | WEIGHT: 166 LBS | TEMPERATURE: 98.3 F | HEART RATE: 72 BPM | SYSTOLIC BLOOD PRESSURE: 130 MMHG | OXYGEN SATURATION: 97 %

## 2019-03-06 DIAGNOSIS — R19.7 DIARRHEA, UNSPECIFIED TYPE: ICD-10-CM

## 2019-03-06 LAB
ALBUMIN SERPL-MCNC: 3.6 G/DL (ref 3.4–5)
ALP SERPL-CCNC: 107 U/L (ref 40–150)
ALT SERPL W P-5'-P-CCNC: 144 U/L (ref 0–70)
ANION GAP SERPL CALCULATED.3IONS-SCNC: 4 MMOL/L (ref 3–14)
AST SERPL W P-5'-P-CCNC: 145 U/L (ref 0–45)
BASOPHILS # BLD AUTO: 0 10E9/L (ref 0–0.2)
BASOPHILS NFR BLD AUTO: 0.4 %
BILIRUB SERPL-MCNC: 0.8 MG/DL (ref 0.2–1.3)
BUN SERPL-MCNC: 18 MG/DL (ref 7–30)
C COLI+JEJUNI+LARI FUSA STL QL NAA+PROBE: NOT DETECTED
C DIFF TOX B STL QL: NEGATIVE
CALCIUM SERPL-MCNC: 8.2 MG/DL (ref 8.5–10.1)
CHLORIDE SERPL-SCNC: 100 MMOL/L (ref 94–109)
CO2 SERPL-SCNC: 30 MMOL/L (ref 20–32)
CREAT SERPL-MCNC: 0.83 MG/DL (ref 0.66–1.25)
DIFFERENTIAL METHOD BLD: ABNORMAL
EC STX1 GENE STL QL NAA+PROBE: NOT DETECTED
EC STX2 GENE STL QL NAA+PROBE: NOT DETECTED
ENTERIC PATHOGEN COMMENT: ABNORMAL
EOSINOPHIL # BLD AUTO: 0.3 10E9/L (ref 0–0.7)
EOSINOPHIL NFR BLD AUTO: 7 %
ERYTHROCYTE [DISTWIDTH] IN BLOOD BY AUTOMATED COUNT: 12.7 % (ref 10–15)
GFR SERPL CREATININE-BSD FRML MDRD: >90 ML/MIN/{1.73_M2}
GLUCOSE SERPL-MCNC: 179 MG/DL (ref 70–99)
HCT VFR BLD AUTO: 47.8 % (ref 40–53)
HGB BLD-MCNC: 15.3 G/DL (ref 13.3–17.7)
IMM GRANULOCYTES # BLD: 0 10E9/L (ref 0–0.4)
IMM GRANULOCYTES NFR BLD: 0.4 %
LIPASE SERPL-CCNC: 95 U/L (ref 73–393)
LYMPHOCYTES # BLD AUTO: 0.5 10E9/L (ref 0.8–5.3)
LYMPHOCYTES NFR BLD AUTO: 10.1 %
MCH RBC QN AUTO: 30.8 PG (ref 26.5–33)
MCHC RBC AUTO-ENTMCNC: 32 G/DL (ref 31.5–36.5)
MCV RBC AUTO: 96 FL (ref 78–100)
MONOCYTES # BLD AUTO: 0.6 10E9/L (ref 0–1.3)
MONOCYTES NFR BLD AUTO: 13.5 %
NEUTROPHILS # BLD AUTO: 3.1 10E9/L (ref 1.6–8.3)
NEUTROPHILS NFR BLD AUTO: 68.6 %
NOROV GI+II ORF1-ORF2 JNC STL QL NAA+PR: ABNORMAL
NRBC # BLD AUTO: 0 10*3/UL
NRBC BLD AUTO-RTO: 0 /100
PLATELET # BLD AUTO: 170 10E9/L (ref 150–450)
POTASSIUM SERPL-SCNC: 4.1 MMOL/L (ref 3.4–5.3)
PROT SERPL-MCNC: 6.9 G/DL (ref 6.8–8.8)
RBC # BLD AUTO: 4.96 10E12/L (ref 4.4–5.9)
RVA NSP5 STL QL NAA+PROBE: NOT DETECTED
SALMONELLA SP RPOD STL QL NAA+PROBE: NOT DETECTED
SHIGELLA SP+EIEC IPAH STL QL NAA+PROBE: NOT DETECTED
SODIUM SERPL-SCNC: 134 MMOL/L (ref 133–144)
SPECIMEN SOURCE: NORMAL
V CHOL+PARA RFBL+TRKH+TNAA STL QL NAA+PR: NOT DETECTED
WBC # BLD AUTO: 4.5 10E9/L (ref 4–11)
Y ENTERO RECN STL QL NAA+PROBE: NOT DETECTED

## 2019-03-06 PROCEDURE — 83690 ASSAY OF LIPASE: CPT | Performed by: EMERGENCY MEDICINE

## 2019-03-06 PROCEDURE — 87506 IADNA-DNA/RNA PROBE TQ 6-11: CPT | Performed by: EMERGENCY MEDICINE

## 2019-03-06 PROCEDURE — 96360 HYDRATION IV INFUSION INIT: CPT | Performed by: EMERGENCY MEDICINE

## 2019-03-06 PROCEDURE — 80053 COMPREHEN METABOLIC PANEL: CPT | Performed by: EMERGENCY MEDICINE

## 2019-03-06 PROCEDURE — 85025 COMPLETE CBC W/AUTO DIFF WBC: CPT | Performed by: EMERGENCY MEDICINE

## 2019-03-06 PROCEDURE — 99283 EMERGENCY DEPT VISIT LOW MDM: CPT | Mod: Z6 | Performed by: EMERGENCY MEDICINE

## 2019-03-06 PROCEDURE — 99283 EMERGENCY DEPT VISIT LOW MDM: CPT | Mod: 25 | Performed by: EMERGENCY MEDICINE

## 2019-03-06 PROCEDURE — 25800030 ZZH RX IP 258 OP 636: Performed by: EMERGENCY MEDICINE

## 2019-03-06 PROCEDURE — 87493 C DIFF AMPLIFIED PROBE: CPT | Performed by: EMERGENCY MEDICINE

## 2019-03-06 RX ORDER — SODIUM CHLORIDE, SODIUM LACTATE, POTASSIUM CHLORIDE, CALCIUM CHLORIDE 600; 310; 30; 20 MG/100ML; MG/100ML; MG/100ML; MG/100ML
INJECTION, SOLUTION INTRAVENOUS ONCE
Status: COMPLETED | OUTPATIENT
Start: 2019-03-06 | End: 2019-03-06

## 2019-03-06 RX ADMIN — SODIUM CHLORIDE, POTASSIUM CHLORIDE, SODIUM LACTATE AND CALCIUM CHLORIDE 1000 ML: 600; 310; 30; 20 INJECTION, SOLUTION INTRAVENOUS at 04:44

## 2019-03-06 ASSESSMENT — ENCOUNTER SYMPTOMS
FATIGUE: 1
DIAPHORESIS: 0
SHORTNESS OF BREATH: 0
ABDOMINAL PAIN: 1
BACK PAIN: 0
WEAKNESS: 0
LIGHT-HEADEDNESS: 0
APPETITE CHANGE: 1
CONSTIPATION: 0
DIARRHEA: 1
DYSURIA: 0
NAUSEA: 1
CHEST TIGHTNESS: 0
FEVER: 0
HEADACHES: 0
VOMITING: 1

## 2019-03-06 ASSESSMENT — MIFFLIN-ST. JEOR: SCORE: 1578.35

## 2019-03-06 NOTE — TELEPHONE ENCOUNTER
[facility] Emergency Department Lab result notification:    [place] ED lab result protocol used  Enteric Bacteria and Virus Panel / Norovirus    Reason for call  Notify of lab results, assess symptoms,  review ED providers recommendations/discharge instructions (if necessary) and advise per ED lab result f/u protocol    Lab Result  Final Enteric Bacteria and Virus Panel by ANNAMARIE Stool is POSITIVE for Norovirus.  Patient to be notified, symptom's assessed and advised per [place] ED lab result f/u protocol.  Information table from ED Provider visit on 3/6/19  Symptoms reported at ED visit (Chief complaint, HPI) Rangel Singh is a 53 year old male with a history of type 1 diabetes, hypertension, and hyperlipidemia presenting for evaluation of persistent heavy frequent episodes of diarrhea.  Patient reports symptoms began on Sunday with initially nausea and vomiting followed by diarrhea.  Was seen in clinic on Monday for the symptoms and given Zofran to help with the nausea.  Nausea since improved and diarrhea was better yesterday but again Tuesday became heavy and frequent.  Patient reports dozens of episodes of loose watery diarrhea which is malodorous and nonbloody.  He reports anytime he eats or drinks anything triggers abdominal cramping followed by diarrhea.  Continues to have some mild nausea but no vomiting.  Denies fever or chills.  No known bad food exposure.  No recent travel.  No recent camping.  Wife had some similar but milder symptoms on Sunday as well but her symptoms have resolved.  No recent antibiotic use.   ED providers Impression and Plan (applicable information) 53-year-old male with history of type 1 diabetes pending for evaluation of frequent watery malodorous diarrhea.  No specific known risk factors or contacts.  Afebrile with normal vital signs in the ED.  Has some moderate epigastric tenderness.  Given IV fluids and blood work and stool sample evaluated.  LFTs mildly  "elevated but no white count or concerning likely abnormalities.  Stool culture pending.  Encouraged oral hydration.  No indication for admission given absence of electrode abnormalities or abnormal vital signs.  Discussed the consideration for over-the-counter antidiarrheals on a limited basis if symptoms need some mild control.   Miscellaneous information N/A     RN Assessment (Patient s current Symptoms), include time called.  [Insert Left message here if message left]  Ray \"not terribly\" better.   Taking Zofran, no emesis.  Diarrhea has not yet improved, sees PCP tomorrow.        RN Recommendations/Instructions per [facility] ED lab result protocol  Did review in depth with Ray and wife general information including infection control related to Norovirus.  Questions answered.      Please Contact your PCP clinic or return to the Emergency department if your:    Symptoms return.    Symptoms worsen or other concerning symptom's.    PCP follow-up Questions asked: YES       Brenda Cardozo RN    [facility] RN  Lung Nodule and ED Lab Results F/U RN  Epic pool (ED late result f/u RN) : [id number]  Ph # [phone]    Copy of Lab result   Order   Enteric Bacteria and Virus Panel by ANNAMARIE Stool [[id number]] (Order [id number])   Exam Information     Exam Date Exam Time Accession # Results    3/6/19  5:01 AM [id number]    Specimen Information: Feces        Component Value Flag Ref Range Units Status Collected Lab   Campylobacter group by ANNAMARIE Not Detected   NDET^Not Detected  Final 03/06/2019  5:01    Salmonella species by ANNAMARIE Not Detected   NDET^Not Detected  Final 03/06/2019  5:01    Shigella species by ANNAMARIE Not Detected   NDET^Not Detected  Final 03/06/2019  5:01    Vibrio group by ANNAMARIE Not Detected   NDET^Not Detected  Final 03/06/2019  5:01    Rotavirus A by ANNAMARIE Not Detected   NDET^Not Detected  Final 03/06/2019  5:01    Shiga toxin 1 gene by ANNAMARIE Not Detected   NDET^Not Detected  " Final 03/06/2019  5:01    Shiga toxin 2 gene by ANNAMARIE Not Detected   NDET^Not Detected  Final 03/06/2019  5:01    Norovirus I and II by ANNAMARIE (Abnormal)   NDET^Not Detected  Final 03/06/2019  5:01    Detected, Abnormal Result Abnormal     Yersinia enterocolitica by ANNAMARIE Not Detected   NDET^Not Detected  Final 03/06/2019  5:01    Enteric pathogen comment     Final 03/06/2019  5:01      ya

## 2019-03-06 NOTE — ED TRIAGE NOTES
Diarrhea Sunday into Monday and restarted yesterday afternoon. Was seen in clinic for nausea and vomiting and got a script for zofran.

## 2019-03-06 NOTE — ED AVS SNAPSHOT
[facility] Emergency Department  [address] [facility] [address]  Phone:  [phone]  Fax:  [phone]                                    Rangel Singh   MRN: [id number]    Department:  [facility] Emergency Department   Date of Visit:  3/6/2019           After Visit Summary Signature Page    I have received my discharge instructions, and my questions have been answered. I have discussed any challenges I see with this plan with the nurse or doctor.    ..........................................................................................................................................  Patient/Patient Representative Signature      ..........................................................................................................................................  Patient Representative Print Name and Relationship to Patient    ..................................................               ................................................  Date                                   Time    ..........................................................................................................................................  Reviewed by Signature/Title    ...................................................              ..............................................  Date                                               Time          22EPIC Rev 08/18

## 2019-03-06 NOTE — ED TRIAGE NOTES
Diarrhea dozens of times tonight, sample collected appears like liquid chocolate milk with some maurice noted.

## 2019-03-06 NOTE — ED PROVIDER NOTES
History     Chief Complaint   Patient presents with     Diarrhea     HPI  Rangel Singh is a 53 year old male with a history of type 1 diabetes, hypertension, and hyperlipidemia presenting for evaluation of persistent heavy frequent episodes of diarrhea.  Patient reports symptoms began on Sunday with initially nausea and vomiting followed by diarrhea.  Was seen in clinic on Monday for the symptoms and given Zofran to help with the nausea.  Nausea since improved and diarrhea was better yesterday but again Tuesday became heavy and frequent.  Patient reports dozens of episodes of loose watery diarrhea which is malodorous and nonbloody.  He reports anytime he eats or drinks anything triggers abdominal cramping followed by diarrhea.  Continues to have some mild nausea but no vomiting.  Denies fever or chills.  No known bad food exposure.  No recent travel.  No recent camping.  Wife had some similar but milder symptoms on Sunday as well but her symptoms have resolved.  No recent antibiotic use.    Allergies:  Allergies   Allergen Reactions     Nkda [No Known Drug Allergies]        Problem List:    Patient Active Problem List    Diagnosis Date Noted     Type 1 diabetes mellitus with hypoglycemia and without coma (H) 11/21/2017     Priority: Medium     Hypoglycemia unawareness.  Uses continuous glucose monitor (Dexcom) for this purpose       Essential hypertension 04/28/2016     Priority: Medium     Adhesive capsulitis of shoulder, left 04/04/2016     Priority: Medium     Follows with [facility] Dr. 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 Insulin started 1999. Cpeptide 0.5 2004. Glutamic acid decarboylase antibody 1.1 12/05 (normal <1.45). No peripheral neuropathy. 11/3/09  Mild to moderate non-proliferative retinopathy.  2016, has mild background retinopathy  Has DEXCOM       Hyperlipidemia LDL goal <100 10/31/2010     Priority: Medium     LVH (left ventricular hypertrophy) 01/08/2009     Priority: Medium     Echo 1/09- borderline right ventricular hypertrophy,  borderline concentric left ventricular hypertrophy          Past Medical History:    Past Medical History:   Diagnosis Date     CELLULITIS NOS      Facial cellulitis 8/11/2014       Past Surgical History:    Past Surgical History:   Procedure Laterality Date     COLONOSCOPY  1/3/2014    Procedure: COMBINED COLONOSCOPY, SINGLE BIOPSY/POLYPECTOMY BY BIOPSY;  Colonoscopy;  Surgeon: Ed Galvez MD;  Location: [facility]     EXAM UNDER ANESTHESIA, MANIPULATE JOINT (LOCATION) Left 3/30/2016    Procedure: EXAM UNDER ANESTHESIA, MANIPULATE JOINT (LOCATION);  Surgeon: Justen Frod MD;  Location: [facility]     SURGICAL HISTORY OF -   08/97    appendectomy ([place], [place])        Family History:    Family History   Problem Relation Age of Onset     C.A.D. Father         50s      Hypertension Father      Atrial fibrillation Father      Hypertension Mother      Cerebrovascular Disease Mother      Atrial fibrillation Mother      Cancer Maternal Grandfather         ?throat/lung cancer     Cerebrovascular Disease Maternal Grandmother      Arthritis Sister         RA     C.A.D. Brother      Atrial fibrillation Brother      Circulatory Other         2 cousins on maternal side .w anurysms     Cancer - colorectal No family hx of      Diabetes No family hx of        Social History:  Marital Status:   [2]  Social History     Tobacco Use     Smoking status: Never Smoker     Smokeless tobacco: Never Used   Substance Use Topics     Alcohol use: Yes     Comment: occ. 2-3 times a month     Drug use: No        Medications:      albuterol (PROAIR HFA/PROVENTIL HFA/VENTOLIN HFA) 108 (90 BASE) MCG/ACT Inhaler   ASPIRIN 81 MG OR TABS  "  atorvastatin (LIPITOR) 10 MG tablet   blood glucose (NO BRAND SPECIFIED) test strip   glucagon (GLUCAGON EMERGENCY) 1 MG kit   insulin glargine (LANTUS VIAL) 100 UNIT/ML vial   insulin lispro (HUMALOG) 100 UNIT/ML vial   insulin syringe-needle U-100 (BD VEO INSULIN SYRINGE U/F) 31G X 15/64\" 0.3 ML   insulin syringes, disposable, U-100 0.3 ML MISC   lisinopril (PRINIVIL/ZESTRIL) 10 MG tablet   ondansetron (ZOFRAN-ODT) 4 MG ODT tab   order for DME   TRUEPLUS LANCETS 33G MISC         Review of Systems   Constitutional: Positive for appetite change and fatigue. Negative for diaphoresis and fever.   HENT: Negative for congestion.    Respiratory: Negative for chest tightness and shortness of breath.    Cardiovascular: Negative for chest pain.   Gastrointestinal: Positive for abdominal pain, diarrhea, nausea and vomiting. Negative for constipation.   Genitourinary: Positive for decreased urine volume. Negative for dysuria.   Musculoskeletal: Negative for back pain.   Skin: Negative for rash.   Neurological: Negative for weakness, light-headedness and headaches.   All other systems reviewed and are negative.      Physical Exam   BP: 130/77  Pulse: 72  Temp: 98.3  F (36.8  C)  Resp: 20  Height: 175.3 cm (5' 9\")  Weight: 75.3 kg (166 lb)  SpO2: 97 %      Physical Exam   Constitutional: He is oriented to person, place, and time. He appears well-developed and well-nourished. No distress.   HENT:   Head: Normocephalic and atraumatic.   Mildly dry appearing mucous membranes   Eyes: Conjunctivae are normal.   Cardiovascular: Normal rate and regular rhythm.   Pulmonary/Chest: Effort normal.   Abdominal: Soft. There is tenderness (moderate epigastric tenderness). There is no rebound and no guarding.   Musculoskeletal: Normal range of motion.   Neurological: He is alert and oriented to person, place, and time.   Skin: Skin is warm and dry. Capillary refill takes less than 2 seconds. He is not diaphoretic.   Psychiatric: He has a " normal mood and affect.   Nursing note and vitals reviewed.      ED Course        Procedures                   Results for orders placed or performed during the hospital encounter of 03/06/19 (from the past 24 hour(s))   CBC with platelets differential   Result Value Ref Range    WBC 4.5 4.0 - 11.0 10e9/L    RBC Count 4.96 4.4 - 5.9 10e12/L    Hemoglobin 15.3 13.3 - 17.7 g/dL    Hematocrit 47.8 40.0 - 53.0 %    MCV 96 78 - 100 fl    MCH 30.8 26.5 - 33.0 pg    MCHC 32.0 31.5 - 36.5 g/dL    RDW 12.7 10.0 - 15.0 %    Platelet Count 170 150 - 450 10e9/L    Diff Method Automated Method     % Neutrophils 68.6 %    % Lymphocytes 10.1 %    % Monocytes 13.5 %    % Eosinophils 7.0 %    % Basophils 0.4 %    % Immature Granulocytes 0.4 %    Nucleated RBCs 0 0 /100    Absolute Neutrophil 3.1 1.6 - 8.3 10e9/L    Absolute Lymphocytes 0.5 (L) 0.8 - 5.3 10e9/L    Absolute Monocytes 0.6 0.0 - 1.3 10e9/L    Absolute Eosinophils 0.3 0.0 - 0.7 10e9/L    Absolute Basophils 0.0 0.0 - 0.2 10e9/L    Abs Immature Granulocytes 0.0 0 - 0.4 10e9/L    Absolute Nucleated RBC 0.0    Comprehensive metabolic panel   Result Value Ref Range    Sodium 134 133 - 144 mmol/L    Potassium 4.1 3.4 - 5.3 mmol/L    Chloride 100 94 - 109 mmol/L    Carbon Dioxide 30 20 - 32 mmol/L    Anion Gap 4 3 - 14 mmol/L    Glucose 179 (H) 70 - 99 mg/dL    Urea Nitrogen 18 7 - 30 mg/dL    Creatinine 0.83 0.66 - 1.25 mg/dL    GFR Estimate >90 >60 mL/min/[1.73_m2]    GFR Estimate If Black >90 >60 mL/min/[1.73_m2]    Calcium 8.2 (L) 8.5 - 10.1 mg/dL    Bilirubin Total 0.8 0.2 - 1.3 mg/dL    Albumin 3.6 3.4 - 5.0 g/dL    Protein Total 6.9 6.8 - 8.8 g/dL    Alkaline Phosphatase 107 40 - 150 U/L     (H) 0 - 70 U/L     (H) 0 - 45 U/L   Lipase   Result Value Ref Range    Lipase 95 73 - 393 U/L       Medications   lactated ringers infusion (1,000 mLs Intravenous New Bag 3/6/19 0444)     5:50 AM: PT re-assessed.  Labs show mild LFT elevation but electrolytes and white  count normal.  Still with some crampy pain and a few episodes of loose stool.  Vitals were normal.  Discussed continued IV fluids for hydration but patient declined.  He feels comfortable going home to continue oral hydration.    Assessments & Plan (with Medical Decision Making)  53-year-old male with history of type 1 diabetes pending for evaluation of frequent watery malodorous diarrhea.  No specific known risk factors or contacts.  Afebrile with normal vital signs in the ED.  Has some moderate epigastric tenderness.  Given IV fluids and blood work and stool sample evaluated.  LFTs mildly elevated but no white count or concerning likely abnormalities.  Stool culture pending.  Encouraged oral hydration.  No indication for admission given absence of electrode abnormalities or abnormal vital signs.  Discussed the consideration for over-the-counter antidiarrheals on a limited basis if symptoms need some mild control.     I have reviewed the nursing notes.    I have reviewed the findings, diagnosis, plan and need for follow up with the patient.          Medication List      There are no discharge medications for this visit.         Final diagnoses:   Diarrhea, unspecified type - Stool culture pending       3/6/2019   [facility] EMERGENCY DEPARTMENT     Snider, Alexis Clark MD  03/06/19 0556

## 2019-03-07 ENCOUNTER — OFFICE VISIT (OUTPATIENT)
Dept: FAMILY MEDICINE | Facility: CLINIC | Age: 54
End: 2019-03-07
Payer: COMMERCIAL

## 2019-03-07 VITALS
WEIGHT: 163.2 LBS | DIASTOLIC BLOOD PRESSURE: 68 MMHG | HEIGHT: 70 IN | OXYGEN SATURATION: 99 % | RESPIRATION RATE: 12 BRPM | HEART RATE: 58 BPM | BODY MASS INDEX: 23.37 KG/M2 | TEMPERATURE: 96.8 F | SYSTOLIC BLOOD PRESSURE: 106 MMHG

## 2019-03-07 DIAGNOSIS — A09 DIARRHEA OF INFECTIOUS ORIGIN: Primary | ICD-10-CM

## 2019-03-07 PROCEDURE — 99213 OFFICE O/P EST LOW 20 MIN: CPT | Performed by: NURSE PRACTITIONER

## 2019-03-07 ASSESSMENT — MIFFLIN-ST. JEOR: SCORE: 1585.49

## 2019-03-07 NOTE — PATIENT INSTRUCTIONS
Continue BRAT diet  Drink more fluids  Zofran as needed   Repeat labs next week     Thank you for choosing [facility].  You may be receiving a survey in the mail from [employer] regarding your visit today.  Please take a few minutes to complete and return the survey to let us know how we are doing.      If you have questions or concerns, please contact us via [employer] or you can contact your care team at [phone].    Our Clinic hours are:  Monday 6:40 am  to 7:00 pm  Tuesday -Friday 6:40 am to 5:00 pm    The [place] outpatient lab hours are:  Monday - Friday 6:10 am to 4:45 pm  Saturdays 7:00 am to 11:00 am  Appointments are required, call [phone]    If you have clinical questions after hours or would like to schedule an appointment,  call the clinic at [phone].

## 2019-03-07 NOTE — PROGRESS NOTES
"  SUBJECTIVE:   Rangel Singh is a 53 year old male who presents to clinic today for the following health issues:diarrhea, feeling better, no loose stools since yesterday. Tested positive for norovirus. Still have mild nausea, takes Zofran as needed with improvement, tolerating small frequent meals, following bland diet.     ED/UC Followup:    Facility:  [facility], [place]   Date of visit: 3/6/19   Reason for visit: Diarrhea - tested positive for Norovirus   Current Status: No Diarrhea as of this morning, getting appetite back . Still feeling weak . Nausea. Zofran helps      Problem list and histories reviewed & adjusted, as indicated.  Additional history: as documented    Labs reviewed in EPIC    Reviewed and updated as needed this visit by clinical staff  Tobacco  Allergies  Meds  Med Hx  Surg Hx  Fam Hx  Soc Hx      Reviewed and updated as needed this visit by Provider         ROS:  Constitutional, HEENT, cardiovascular, pulmonary, gi and gu systems are negative, except as otherwise noted.    OBJECTIVE:     /68 (BP Location: Left arm, Patient Position: Chair, Cuff Size: Adult Regular)   Pulse 58   Temp 96.8  F (36  C) (Tympanic)   Resp 12   Ht 1.784 m (5' 10.25\")   Wt 74 kg (163 lb 3.2 oz)   SpO2 99%   BMI 23.25 kg/m    Body mass index is 23.25 kg/m .  GENERAL: healthy, alert and no distress  ABDOMEN: soft, nontender, no hepatosplenomegaly, no masses and bowel sounds normal  SKIN: no suspicious lesions or rashes  PSYCH: mentation appears normal, affect normal/bright    Diagnostic Test Results:  Pending     ASSESSMENT/PLAN:     1. Diarrhea of infectious origin  -improving  -continue with BRAT diet, advance to bland diet as tolerated  -drink more fluids  -recheck liver panel next week   - Comprehensive metabolic panel (BMP + Alb, Alk Phos, ALT, AST, Total. Bili, TP); Future    See Patient Instructions    LEXI Rosales [facility] - FAMILY PRACT[facility]  "

## 2019-03-14 DIAGNOSIS — A09 DIARRHEA OF INFECTIOUS ORIGIN: ICD-10-CM

## 2019-03-14 LAB
ALBUMIN SERPL-MCNC: 3.5 G/DL (ref 3.4–5)
ALP SERPL-CCNC: 101 U/L (ref 40–150)
ALT SERPL W P-5'-P-CCNC: 76 U/L (ref 0–70)
ANION GAP SERPL CALCULATED.3IONS-SCNC: 3 MMOL/L (ref 3–14)
AST SERPL W P-5'-P-CCNC: 40 U/L (ref 0–45)
BILIRUB SERPL-MCNC: 0.4 MG/DL (ref 0.2–1.3)
BUN SERPL-MCNC: 28 MG/DL (ref 7–30)
CALCIUM SERPL-MCNC: 8.2 MG/DL (ref 8.5–10.1)
CHLORIDE SERPL-SCNC: 105 MMOL/L (ref 94–109)
CO2 SERPL-SCNC: 29 MMOL/L (ref 20–32)
CREAT SERPL-MCNC: 0.7 MG/DL (ref 0.66–1.25)
GFR SERPL CREATININE-BSD FRML MDRD: >90 ML/MIN/{1.73_M2}
GLUCOSE SERPL-MCNC: 147 MG/DL (ref 70–99)
POTASSIUM SERPL-SCNC: 4.5 MMOL/L (ref 3.4–5.3)
PROT SERPL-MCNC: 6.6 G/DL (ref 6.8–8.8)
SODIUM SERPL-SCNC: 137 MMOL/L (ref 133–144)

## 2019-03-14 PROCEDURE — 80053 COMPREHEN METABOLIC PANEL: CPT | Performed by: NURSE PRACTITIONER

## 2019-03-14 PROCEDURE — 36415 COLL VENOUS BLD VENIPUNCTURE: CPT | Performed by: NURSE PRACTITIONER

## 2019-03-29 ENCOUNTER — TRANSFERRED RECORDS (OUTPATIENT)
Dept: HEALTH INFORMATION MANAGEMENT | Facility: CLINIC | Age: 54
End: 2019-03-29

## 2019-03-29 DIAGNOSIS — E10.319 TYPE 1 DIABETES MELLITUS WITH RETINOPATHY OF BOTH EYES, MACULAR EDEMA PRESENCE UNSPECIFIED, UNSPECIFIED RETINOPATHY SEVERITY (H): ICD-10-CM

## 2019-03-29 LAB
HBA1C MFR BLD: 6 % (ref 0–5.6)
RETINOPATHY: POSITIVE

## 2019-03-29 PROCEDURE — 36415 COLL VENOUS BLD VENIPUNCTURE: CPT | Performed by: INTERNAL MEDICINE

## 2019-03-29 PROCEDURE — 83036 HEMOGLOBIN GLYCOSYLATED A1C: CPT | Performed by: INTERNAL MEDICINE

## 2019-04-10 ENCOUNTER — TELEPHONE (OUTPATIENT)
Dept: INTERNAL MEDICINE | Facility: CLINIC | Age: 54
End: 2019-04-10

## 2019-04-10 NOTE — TELEPHONE ENCOUNTER
Total eye care report received and reviewed by the PCP  Total eye care - 3/29/19  Form placed in abstraction.  Rachel Mccann CMA ([place]UNO)   (aka: Tara Mccann)

## 2019-09-27 ENCOUNTER — HEALTH MAINTENANCE LETTER (OUTPATIENT)
Age: 54
End: 2019-09-27

## 2019-10-07 ENCOUNTER — MYC MEDICAL ADVICE (OUTPATIENT)
Dept: FAMILY MEDICINE | Facility: CLINIC | Age: 54
End: 2019-10-07

## 2019-10-07 NOTE — TELEPHONE ENCOUNTER
Provider covering for Dr. Ji,    Patient gives us less than 1 days notice on needing [facility] letter for [facility].  Please see my chart message.  I have attempted to call the patient and sent him a my chart message stating that Dr. Ji is out of office and we may not be able to provide this letter on such short notice. I have started a letter if appropriate.  Please advise. Alena LINCOLN RN

## 2019-10-07 NOTE — LETTER
Rangel Singh  [address] [address] [address]            To Whom This May Concern:    Mr. Singh has been under my care since 2015.  He has diabetes type 1 with hypoglycemia unawareness.  He requires the use of a continuous glucose monitor to track his blood sugar and prevent diabetic coma.  He exercises regularly and follows a diabetic diet.      Dr. Ana Laura Ji,   [facility]

## 2019-10-07 NOTE — TELEPHONE ENCOUNTER
Dr. Ji,    The patient returns our call.  Per patient he does not need the letter for the [facility] tomorrow.  If we could complete the letter with the information he provided and mail it to him would be great.  Alena LINCOLN RN

## 2019-10-08 NOTE — TELEPHONE ENCOUNTER
Patient notified of letter ready. Agreed he will pick letter up at  .  Catherine Benoit on 10/8/2019 at 8:42 AM

## 2019-10-26 ENCOUNTER — HEALTH MAINTENANCE LETTER (OUTPATIENT)
Age: 54
End: 2019-10-26

## 2019-12-13 ENCOUNTER — TRANSFERRED RECORDS (OUTPATIENT)
Dept: HEALTH INFORMATION MANAGEMENT | Facility: CLINIC | Age: 54
End: 2019-12-13

## 2019-12-13 LAB
ALBUMIN/CREATININE RATIO: 8 MG/G
TSH SERPL-ACNC: 2.97 UIU/ML (ref 0.47–5)

## 2020-01-07 ENCOUNTER — TELEPHONE (OUTPATIENT)
Dept: INTERNAL MEDICINE | Facility: CLINIC | Age: 55
End: 2020-01-07

## 2020-01-07 DIAGNOSIS — E10.3293 TYPE 1 DIABETES MELLITUS WITH MILD NONPROLIFERATIVE RETINOPATHY OF BOTH EYES WITHOUT MACULAR EDEMA (H): Primary | Chronic | ICD-10-CM

## 2020-01-07 DIAGNOSIS — E78.5 HYPERLIPIDEMIA LDL GOAL <100: Chronic | ICD-10-CM

## 2020-01-07 NOTE — TELEPHONE ENCOUNTER
Pended CBC and CMP as requested. Provider please place any others as needed.     Provider please review and advise. Thank you.

## 2020-01-07 NOTE — TELEPHONE ENCOUNTER
Reason for Call: Request for an order or referral:    Order or referral being requested: Patient is calling for lab orders.  He has an appt with Dr. Ji on 1/22.  He has an appt for lab draw on 1/11.  He is requesting orders for CBC and CMP.    Date needed: as soon as possible    Has the patient been seen by the PCP for this problem? YES    Phone number Patient can be reached at:  Home number on file [phone] (home)    Best Time:  Any    Can we leave a detailed message on this number?  YES    Call taken on 1/7/2020 at 4:00 PM by Heather Eller

## 2020-01-08 NOTE — TELEPHONE ENCOUNTER
Orders signed, added lipid panel and A1C as he appears to be due for these, please let him know he should be fasting.    Thanks,  Jelly Meza, CNP

## 2020-01-11 DIAGNOSIS — E10.3293 TYPE 1 DIABETES MELLITUS WITH MILD NONPROLIFERATIVE RETINOPATHY OF BOTH EYES WITHOUT MACULAR EDEMA (H): Chronic | ICD-10-CM

## 2020-01-11 DIAGNOSIS — E78.5 HYPERLIPIDEMIA LDL GOAL <100: Chronic | ICD-10-CM

## 2020-01-11 LAB
ALBUMIN SERPL-MCNC: 3.8 G/DL (ref 3.4–5)
ALP SERPL-CCNC: 96 U/L (ref 40–150)
ALT SERPL W P-5'-P-CCNC: 31 U/L (ref 0–70)
ANION GAP SERPL CALCULATED.3IONS-SCNC: 1 MMOL/L (ref 3–14)
AST SERPL W P-5'-P-CCNC: 25 U/L (ref 0–45)
BILIRUB SERPL-MCNC: 0.7 MG/DL (ref 0.2–1.3)
BUN SERPL-MCNC: 18 MG/DL (ref 7–30)
CALCIUM SERPL-MCNC: 8.9 MG/DL (ref 8.5–10.1)
CHLORIDE SERPL-SCNC: 105 MMOL/L (ref 94–109)
CHOLEST SERPL-MCNC: 201 MG/DL
CO2 SERPL-SCNC: 31 MMOL/L (ref 20–32)
CREAT SERPL-MCNC: 0.79 MG/DL (ref 0.66–1.25)
ERYTHROCYTE [DISTWIDTH] IN BLOOD BY AUTOMATED COUNT: 13 % (ref 10–15)
GFR SERPL CREATININE-BSD FRML MDRD: >90 ML/MIN/{1.73_M2}
GLUCOSE SERPL-MCNC: 145 MG/DL (ref 70–99)
HBA1C MFR BLD: 6.3 % (ref 0–5.6)
HCT VFR BLD AUTO: 45 % (ref 40–53)
HDLC SERPL-MCNC: 75 MG/DL
HGB BLD-MCNC: 14.4 G/DL (ref 13.3–17.7)
LDLC SERPL CALC-MCNC: 119 MG/DL
MCH RBC QN AUTO: 30.8 PG (ref 26.5–33)
MCHC RBC AUTO-ENTMCNC: 32 G/DL (ref 31.5–36.5)
MCV RBC AUTO: 96 FL (ref 78–100)
NONHDLC SERPL-MCNC: 126 MG/DL
PLATELET # BLD AUTO: 184 10E9/L (ref 150–450)
POTASSIUM SERPL-SCNC: 4.9 MMOL/L (ref 3.4–5.3)
PROT SERPL-MCNC: 7.2 G/DL (ref 6.8–8.8)
RBC # BLD AUTO: 4.67 10E12/L (ref 4.4–5.9)
SODIUM SERPL-SCNC: 137 MMOL/L (ref 133–144)
TRIGL SERPL-MCNC: 34 MG/DL
WBC # BLD AUTO: 3.1 10E9/L (ref 4–11)

## 2020-01-11 PROCEDURE — 85027 COMPLETE CBC AUTOMATED: CPT | Performed by: NURSE PRACTITIONER

## 2020-01-11 PROCEDURE — 36415 COLL VENOUS BLD VENIPUNCTURE: CPT | Performed by: NURSE PRACTITIONER

## 2020-01-11 PROCEDURE — 83036 HEMOGLOBIN GLYCOSYLATED A1C: CPT | Performed by: NURSE PRACTITIONER

## 2020-01-11 PROCEDURE — 80053 COMPREHEN METABOLIC PANEL: CPT | Performed by: NURSE PRACTITIONER

## 2020-01-11 PROCEDURE — 80061 LIPID PANEL: CPT | Performed by: NURSE PRACTITIONER

## 2020-03-01 LAB — RETINOPATHY: POSITIVE

## 2020-06-23 ENCOUNTER — TRANSFERRED RECORDS (OUTPATIENT)
Dept: HEALTH INFORMATION MANAGEMENT | Facility: CLINIC | Age: 55
End: 2020-06-23

## 2020-06-23 LAB — HBA1C MFR BLD: 6.5 % (ref 0–5.7)

## 2020-09-22 DIAGNOSIS — E10.65 TYPE 1 DIABETES MELLITUS WITH HYPERGLYCEMIA (H): Primary | ICD-10-CM

## 2020-09-22 LAB — HBA1C MFR BLD: 5.8 % (ref 0–5.6)

## 2020-09-22 PROCEDURE — 83036 HEMOGLOBIN GLYCOSYLATED A1C: CPT | Performed by: NURSE PRACTITIONER

## 2020-09-22 PROCEDURE — 36415 COLL VENOUS BLD VENIPUNCTURE: CPT | Performed by: NURSE PRACTITIONER

## 2021-01-04 ENCOUNTER — TRANSFERRED RECORDS (OUTPATIENT)
Dept: HEALTH INFORMATION MANAGEMENT | Facility: CLINIC | Age: 56
End: 2021-01-04

## 2021-01-04 LAB
ALBUMIN (URINE) MG/SPEC: <5 MG/L
CHOLEST SERPL-MCNC: 182 MG/DL
CREAT SERPL-MCNC: 0.81 MG/DL (ref 0.72–1.25)
CREATININE (URINE): 48 MG/DL
GFR SERPL CREATININE-BSD FRML MDRD: >60 ML/MIN/1.73M2
HBA1C MFR BLD: 5.9 % (ref 0–5.7)
HDLC SERPL-MCNC: 64 MG/DL
LDLC SERPL CALC-MCNC: 110 MG/DL (ref 0–159)
TRIGL SERPL-MCNC: 39 MG/DL (ref 30–150)
TSH SERPL-ACNC: 2.03 UIU/ML (ref 0.47–5)

## 2021-01-09 ENCOUNTER — HEALTH MAINTENANCE LETTER (OUTPATIENT)
Age: 56
End: 2021-01-09

## 2021-03-01 ENCOUNTER — TRANSFERRED RECORDS (OUTPATIENT)
Dept: HEALTH INFORMATION MANAGEMENT | Facility: CLINIC | Age: 56
End: 2021-03-01

## 2021-03-01 LAB — RETINOPATHY: NEGATIVE

## 2021-06-11 ASSESSMENT — ENCOUNTER SYMPTOMS
SHORTNESS OF BREATH: 0
NAUSEA: 0
FREQUENCY: 0
SORE THROAT: 0
PARESTHESIAS: 0
DYSURIA: 0
MYALGIAS: 0
HEMATURIA: 0
PALPITATIONS: 0
DIZZINESS: 0
COUGH: 0
HEARTBURN: 0
DIARRHEA: 0
CHILLS: 0
FEVER: 0
JOINT SWELLING: 0
ARTHRALGIAS: 0
EYE PAIN: 0
HEADACHES: 0
HEMATOCHEZIA: 0
NERVOUS/ANXIOUS: 0
ABDOMINAL PAIN: 0
CONSTIPATION: 0

## 2021-06-14 ENCOUNTER — OFFICE VISIT (OUTPATIENT)
Dept: FAMILY MEDICINE | Facility: CLINIC | Age: 56
End: 2021-06-14
Payer: COMMERCIAL

## 2021-06-14 VITALS
HEIGHT: 70 IN | BODY MASS INDEX: 23.51 KG/M2 | DIASTOLIC BLOOD PRESSURE: 60 MMHG | HEART RATE: 63 BPM | WEIGHT: 164.2 LBS | TEMPERATURE: 97.2 F | SYSTOLIC BLOOD PRESSURE: 114 MMHG | RESPIRATION RATE: 16 BRPM | OXYGEN SATURATION: 97 %

## 2021-06-14 DIAGNOSIS — K14.8 TONGUE LESION: ICD-10-CM

## 2021-06-14 DIAGNOSIS — L98.9 SKIN LESION: ICD-10-CM

## 2021-06-14 DIAGNOSIS — Z00.00 ROUTINE GENERAL MEDICAL EXAMINATION AT A HEALTH CARE FACILITY: Primary | ICD-10-CM

## 2021-06-14 DIAGNOSIS — Z12.5 SCREENING FOR PROSTATE CANCER: ICD-10-CM

## 2021-06-14 DIAGNOSIS — E10.3293 TYPE 1 DIABETES MELLITUS WITH MILD NONPROLIFERATIVE RETINOPATHY OF BOTH EYES WITHOUT MACULAR EDEMA (H): Chronic | ICD-10-CM

## 2021-06-14 LAB — PSA SERPL-ACNC: 3.09 UG/L (ref 0–4)

## 2021-06-14 PROCEDURE — 99396 PREV VISIT EST AGE 40-64: CPT | Performed by: FAMILY MEDICINE

## 2021-06-14 PROCEDURE — 99213 OFFICE O/P EST LOW 20 MIN: CPT | Mod: 25 | Performed by: FAMILY MEDICINE

## 2021-06-14 PROCEDURE — 36415 COLL VENOUS BLD VENIPUNCTURE: CPT | Performed by: FAMILY MEDICINE

## 2021-06-14 PROCEDURE — G0103 PSA SCREENING: HCPCS | Performed by: FAMILY MEDICINE

## 2021-06-14 ASSESSMENT — ENCOUNTER SYMPTOMS
JOINT SWELLING: 0
DIZZINESS: 0
CONSTIPATION: 0
HEADACHES: 0
ARTHRALGIAS: 0
CHILLS: 0
SORE THROAT: 0
DIARRHEA: 0
HEMATURIA: 0
NERVOUS/ANXIOUS: 0
DYSURIA: 0
PARESTHESIAS: 0
SHORTNESS OF BREATH: 0
ABDOMINAL PAIN: 0
HEARTBURN: 0
EYE PAIN: 0
COUGH: 0
MYALGIAS: 0
HEMATOCHEZIA: 0
FEVER: 0
PALPITATIONS: 0
NAUSEA: 0
FREQUENCY: 0

## 2021-06-14 ASSESSMENT — MIFFLIN-ST. JEOR: SCORE: 1575.03

## 2021-06-14 NOTE — PATIENT INSTRUCTIONS
Please go to lab.    I did a referral to dermatology here for further evaluation  Of your two lesoins.    Please be aware that there will be an additional charge during your preventative visit due to either a new diagnosis and/or chronic disease management.    Preventative visits screen for diseases prior to they occur.  They do not cover for any new diagnosis or chronic disease management which would include medication refills, labs etc.    If you have questions regarding your coverage please check with your insurance provider.  At [facility] we need to code correctly to be in compliance with all insurance companies.          Thank you for choosing [facility].  You may be receiving an email and/or telephone survey request from [facility] regarding your visit today.  Please take a few minutes to respond to the survey to let us know how we are doing.      If you have questions or concerns, please contact us via [email or web address] or you can contact your care team at [phone] option [phone].    Our Clinic hours are:  Monday - Thursday 7am-6pm  Friday 7am-5pm    The [place] outpatient lab hours are:  Monday - Friday 7am-4:30pm    Appointments are required, call [phone]    If you have clinical questions after hours or would like to schedule an appointment,  call the clinic at [phone].    Preventive Health Recommendations  Male Ages 50 - 64    Yearly exam:             See your health care provider every year in order to  o   Review health changes.   o   Discuss preventive care.    o   Review your medicines if your doctor has prescribed any.     Have a cholesterol test every 5 years, or more frequently if you are at risk for high cholesterol/heart disease.     Have a diabetes test (fasting glucose) every three years. If you are at risk for diabetes, you should have this test more often.     Have a colonoscopy at age 50, or have a yearly FIT test (stool test). These exams will check for colon cancer.       Talk with your health care provider about whether or not a prostate cancer screening test (PSA) is right for you.    You should be tested each year for STDs (sexually transmitted diseases), if you re at risk.     Shots: Get a flu shot each year. Get a tetanus shot every 10 years.     Nutrition:    Eat at least 5 servings of fruits and vegetables daily.     Eat whole-grain bread, whole-wheat pasta and brown rice instead of white grains and rice.     Get adequate Calcium and Vitamin D.     Lifestyle    Exercise for at least 150 minutes a week (30 minutes a day, 5 days a week). This will help you control your weight and prevent disease.     Limit alcohol to one drink per day.     No smoking.     Wear sunscreen to prevent skin cancer.     See your dentist every six months for an exam and cleaning.     See your eye doctor every 1 to 2 years.

## 2021-06-14 NOTE — Clinical Note
Please abstract the following data from this visit with this patient into the appropriate field in Epic:    Tests that can be patient reported without a hard copy:    Eye exam with ophthalmology on this date: 3/1/2021 with no retinopathy

## 2021-06-14 NOTE — PROGRESS NOTES
SUBJECTIVE:   CC: Rangel Singh is an 56 year old male who presents for preventative health visit.     Patient has been advised of split billing requirements and indicates understanding: Yes  Healthy Habits:     Getting at least 3 servings of Calcium per day:  Yes    Bi-annual eye exam:  Yes    Dental care twice a year:  Yes    Sleep apnea or symptoms of sleep apnea:  None    Diet:  Regular (no restrictions)    Frequency of exercise:  6-7 days/week    Duration of exercise:  45-60 minutes    Taking medications regularly:  Yes    Barriers to taking medications:  None    Medication side effects:  Not applicable and None    PHQ-2 Total Score: 0    Additional concerns today:  Yes              Today's PHQ-2 Score:   PHQ-2 ( 1999 Pfizer) 6/11/2021   Q1: Little interest or pleasure in doing things 0   Q2: Feeling down, depressed or hopeless 0   PHQ-2 Score 0   Q1: Little interest or pleasure in doing things Not at all   Q2: Feeling down, depressed or hopeless Not at all   PHQ-2 Score 0       Abuse: Current or Past(Physical, Sexual or Emotional)- No  Do you feel safe in your environment? Yes        Social History     Tobacco Use     Smoking status: Never Smoker     Smokeless tobacco: Never Used   Substance Use Topics     Alcohol use: Yes     Comment: occ. 2-3 times a month     If you drink alcohol do you typically have >3 drinks per day or >7 drinks per week? No    No flowsheet data found.    Last PSA: No results found for: PSA    Reviewed orders with patient. Reviewed health maintenance and updated orders accordingly - Yes      Reviewed and updated as needed this visit by clinical staff   Allergies  Meds              Reviewed and updated as needed this visit by Provider                    Review of Systems   Constitutional: Negative for chills and fever.   HENT: Negative for congestion, ear pain, hearing loss and sore throat.    Eyes: Negative for pain and visual disturbance.   Respiratory: Negative for cough and  "shortness of breath.    Cardiovascular: Negative for chest pain, palpitations and peripheral edema.   Gastrointestinal: Negative for abdominal pain, constipation, diarrhea, heartburn, hematochezia and nausea.   Genitourinary: Positive for impotence. Negative for discharge, dysuria, frequency, genital sores, hematuria and urgency.   Musculoskeletal: Negative for arthralgias, joint swelling and myalgias.   Skin: Negative for rash.   Neurological: Negative for dizziness, headaches and paresthesias.   Psychiatric/Behavioral: Negative for mood changes. The patient is not nervous/anxious.      He has a skin lesion on right temple side and a tongue lesion.    He has ED as noted above.  He has type 1 diabetes.  He has tried viagra and it did not work for him. He has seen urology.    OBJECTIVE:   /60   Pulse 63   Temp 97.2  F (36.2  C) (Tympanic)   Resp 16   Ht 1.784 m (5' 10.25\")   Wt 74.5 kg (164 lb 3.2 oz)   SpO2 97%   BMI 23.39 kg/m      Physical Exam  GENERAL: healthy, alert and no distress  EYES: Eyes grossly normal to inspection, PERRL and conjunctivae and sclerae normal  HENT: ear canals and TM's normal, nose and mouth without ulcers or lesions  NECK: no adenopathy, no asymmetry, masses, or scars and thyroid normal to palpation  RESP: lungs clear to auscultation - no rales, rhonchi or wheezes  CV: regular rate and rhythm, normal S1 S2, no S3 or S4, no murmur, click or rub, no peripheral edema and peripheral pulses strong  ABDOMEN: soft, nontender, no hepatosplenomegaly, no masses and bowel sounds normal  MS: no gross musculoskeletal defects noted, no edema  SKIN: has a compound nevus on right temple about 4 mm diameter brown and elevate, does not quite to appear to be a SK, and a tongue papule on left edge noted about 3 mm diameter  NEURO: Normal strength and tone, mentation intact and speech normal  PSYCH: mentation appears normal, affect normal/bright  LYMPH: anterior cervical: no adenopathy  posterior " "cervical: no adenopathy        ASSESSMENT/PLAN:   (Z00.00) Routine general medical examination at a health care facility  (primary encounter diagnosis)  Comment: Discussed healthy lifestyle and preventative cares.    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    Weight as of this encounter: 74.5 kg (164 lb 3.2 oz).         He reports that he has never smoked. He has never used smokeless tobacco.      Counseling Resources:  ATP IV Guidelines  Pooled Cohorts Equation Calculator  FRAX Risk Assessment  ICSI Preventive Guidelines  Dietary Guidelines for Americans, 2010  USDA's MyPlate  ASA Prophylaxis  Lung CA Screening    Patel Boogie MD  [facility]  "

## 2021-07-05 ENCOUNTER — TRANSFERRED RECORDS (OUTPATIENT)
Dept: HEALTH INFORMATION MANAGEMENT | Facility: CLINIC | Age: 56
End: 2021-07-05

## 2021-07-05 LAB
CHOLESTEROL (EXTERNAL): 216 MG/DL
HBA1C MFR BLD: 6 %
HDLC SERPL-MCNC: 64 MG/DL
LDL CHOLESTEROL CALCULATED (EXTERNAL): 144 MG/DL (ref 0–159)
TRIGLYCERIDES (EXTERNAL): 40 MG/DL (ref 30–150)

## 2021-07-12 ENCOUNTER — OFFICE VISIT (OUTPATIENT)
Dept: DERMATOLOGY | Facility: CLINIC | Age: 56
End: 2021-07-12
Attending: FAMILY MEDICINE
Payer: COMMERCIAL

## 2021-07-12 VITALS — OXYGEN SATURATION: 100 % | HEART RATE: 60 BPM | DIASTOLIC BLOOD PRESSURE: 78 MMHG | SYSTOLIC BLOOD PRESSURE: 135 MMHG

## 2021-07-12 DIAGNOSIS — K14.8 TONGUE LESION: ICD-10-CM

## 2021-07-12 DIAGNOSIS — L82.0 INFLAMED SEBORRHEIC KERATOSIS: ICD-10-CM

## 2021-07-12 DIAGNOSIS — L98.9 SKIN LESION: ICD-10-CM

## 2021-07-12 DIAGNOSIS — L82.1 SEBORRHEIC KERATOSIS: Primary | ICD-10-CM

## 2021-07-12 DIAGNOSIS — L81.4 LENTIGO: ICD-10-CM

## 2021-07-12 DIAGNOSIS — D36.10 NEUROMA: ICD-10-CM

## 2021-07-12 DIAGNOSIS — D18.01 ANGIOMA OF SKIN: ICD-10-CM

## 2021-07-12 DIAGNOSIS — D23.9 DERMAL NEVUS: ICD-10-CM

## 2021-07-12 PROCEDURE — 17110 DESTRUCTION B9 LES UP TO 14: CPT | Performed by: DERMATOLOGY

## 2021-07-12 PROCEDURE — 99243 OFF/OP CNSLTJ NEW/EST LOW 30: CPT | Mod: 25 | Performed by: DERMATOLOGY

## 2021-07-12 NOTE — LETTER
7/12/2021         RE: Rangel Singh  [id number]  [facility] [id number]        Dear Colleague,    Thank you for referring your patient, Rangel Singh, to the [facility]. Please see a copy of my visit note below.    Rangel Singh is an extremely pleasant 56 year old year old male patient I was asked to see by Dr. Boogie for spots on skin.  Patient states this has been present for a while.  Patient reports the following symptoms:  Itching on forehead.  Patient reports the following previous treatments none.  These treatments did not work.  Patient reports the following modifying factors none.  Associated symptoms: none.  Patient has no other skin complaints today.  Remainder of the HPI, Meds, PMH, Allergies, FH, and SH was reviewed in chart.      Past Medical History:   Diagnosis Date     CELLULITIS NOS     left danny-auricular 2004     Facial cellulitis 8/11/2014       Past Surgical History:   Procedure Laterality Date     COLONOSCOPY  1/3/2014    Procedure: COMBINED COLONOSCOPY, SINGLE BIOPSY/POLYPECTOMY BY BIOPSY;  Colonoscopy;  Surgeon: Ed Galvez MD;  Location: [facility]     EXAM UNDER ANESTHESIA, MANIPULATE JOINT (LOCATION) Left 3/30/2016    Procedure: EXAM UNDER ANESTHESIA, MANIPULATE JOINT (LOCATION);  Surgeon: Justen Ford MD;  Location: [facility]     SURGICAL HISTORY OF -   08/97    appendectomy ([place], [place])         Family History   Problem Relation Age of Onset     C.A.D. Father         50s      Hypertension Father      Atrial fibrillation Father      Hypertension Mother      Cerebrovascular Disease Mother      Atrial fibrillation Mother      Cancer Maternal Grandfather         ?throat/lung cancer     Cerebrovascular Disease Maternal Grandmother      Arthritis Sister         RA     C.A.D. Brother      Atrial fibrillation Brother      Circulatory Other         2 cousins on maternal side .w anurysms     Cancer - colorectal No family hx of       Diabetes No family hx of        Social History     Socioeconomic History     Marital status:      Spouse name: Not on file     Number of children: Not on file     Years of education: Not on file     Highest education level: Not on file   Occupational History     Occupation:      Employer: [employer]   Tobacco Use     Smoking status: Never Smoker     Smokeless tobacco: Never Used   Substance and Sexual Activity     Alcohol use: Yes     Comment: occ. 2-3 times a month     Drug use: No     Sexual activity: Yes     Partners: Female   Other Topics Concern     Parent/sibling w/ CABG, MI or angioplasty before 65F 55M? No   Social History Narrative     Not on file     Social Determinants of Health     Financial Resource Strain:      Difficulty of Paying Living Expenses:    Food Insecurity:      Worried About Running Out of Food in the Last Year:      Ran Out of Food in the Last Year:    Transportation Needs:      Lack of Transportation (Medical):      Lack of Transportation (Non-Medical):    Physical Activity:      Days of Exercise per Week:      Minutes of Exercise per Session:    Stress:      Feeling of Stress :    Social Connections:      Frequency of Communication with Friends and Family:      Frequency of Social Gatherings with Friends and Family:      Attends Yarsanism Services:      Active Member of Clubs or Organizations:      Attends Club or Organization Meetings:      Marital Status:    Intimate Partner Violence:      Fear of Current or Ex-Partner:      Emotionally Abused:      Physically Abused:      Sexually Abused:        Outpatient Encounter Medications as of 7/12/2021   Medication Sig Dispense Refill     ASPIRIN 81 MG OR TABS 1 tab po QD (Once per day) 100 3     atorvastatin (LIPITOR) 10 MG tablet Take 1 tablet (10 mg) by mouth every other day 45 tablet 3     blood glucose (NO BRAND SPECIFIED) test strip Check BS 4-5x/day.  Dispense 3 month supply. Accu-Check Compact Plus. 510 each 11  "    glucagon (GLUCAGON EMERGENCY) 1 MG kit Inject 1 mg into the muscle once for 1 dose 1 mg 11     insulin glargine (LANTUS VIAL) 100 UNIT/ML vial INJECT 2 TO 5 UNITS SUBCUTANEOUSLY AT BEDTIME 10 mL 11     insulin lispro (HUMALOG) 100 UNIT/ML vial INJECT 2-3 UNITS IN THE MORNING, 2-3 AT LUNCH, AND 2-4 IN THE EVENING AND SLIDING SCALE. ESTIMATED MAXIMUM UNITS DAILY IS 25 UNITS PER DAY 10 mL 11     insulin syringe-needle U-100 (BD VEO INSULIN SYRINGE U/F) 31G X 15/64\" 0.3 ML Use 4 syringes daily or as directed. 120 each 11     insulin syringes, disposable, U-100 0.3 ML MISC 1 Device 4 times daily 100 each 11     lisinopril (PRINIVIL/ZESTRIL) 10 MG tablet Take 1 tablet (10 mg) by mouth daily 90 tablet 3     order for DME Equipment being ordered: Continue glucose monitor - Dexcom G4 - , transmitter, sensor 1 each prn     TRUEPLUS LANCETS 33G MISC 1 Device 4 times daily 100 each 11     No facility-administered encounter medications on file as of 7/12/2021.             O:   NAD, WDWN, Alert & Oriented, Mood & Affect wnl, Vitals stable   Here today alone   /78   Pulse 60   SpO2 100%    General appearance normal   Vitals stable   Alert, oriented and in no acute distress      Following lymph nodes palpated: Occipital, Cervical, Supraclavicular no lad   R brow inflamed seborrheic keratosis      Stuck on papules and brown macules on trunk and ext   Red papules on trunk  Flesh colored papules on trunk  L lat tongue skin colored 2mm papule      The remainder of the full exam was normal; the following areas were examined:  conjunctiva/lids, oral mucosa, neck, peripheral vascular system, abdomen, lymph nodes, digits/nails, eccrine and apocrine glands, scalp/hair, face, neck, chest, abdomen, buttocks, back, RUE, LUE, RLE, LLE       Eyes: Conjunctivae/lids:Normal     ENT: Lips, buccal mucosa, tongue: normal    MSK:Normal    Cardiovascular: peripheral edema none    Pulm: Breathing Normal    Lymph Nodes: No Head and " Neck Lymphadenopathy     Neuro/Psych: Orientation:Alert and Orientedx3 ; Mood/Affect:normal       A/P:  1. Seborrheic keratosis, lentigo, angioma, dermal nevus  2. neruoma   2. R brow inflamed seborrheic keratosis   LN2:  Treated with LN2 for 5s for 1-2 cycles. Warned risks of blistering, pain, pigment change, scarring, and incomplete resolution.  Advised patient to return if lesions do not completely resolve.  Wound care sheet given.    It was a pleasure speaking to Rangel Singh today.  Previous clinic notes and pertinent laboratory tests were reviewed prior to Rangel Singh's visit.    Nature and genetics of benign skin lesions dicussed with patient.  Signs and Symptoms of skin cancer discussed with patient.  Patient encouraged to perform monthly skin exams.  UV precautions reviewed with patient.  Risks of non-melanoma skin cancer discussed with patient   Return to clinic 12 months        Again, thank you for allowing me to participate in the care of your patient.        Sincerely,        Ruben Cee MD

## 2021-07-12 NOTE — PROGRESS NOTES
Rangel Singh is an extremely pleasant 56 year old year old male patient I was asked to see by Dr. Boogie for spots on skin.  Patient states this has been present for a while.  Patient reports the following symptoms:  Itching on forehead.  Patient reports the following previous treatments none.  These treatments did not work.  Patient reports the following modifying factors none.  Associated symptoms: none.  Patient has no other skin complaints today.  Remainder of the HPI, Meds, PMH, Allergies, FH, and SH was reviewed in chart.      Past Medical History:   Diagnosis Date     CELLULITIS NOS     left danny-auricular 2004     Facial cellulitis 8/11/2014       Past Surgical History:   Procedure Laterality Date     COLONOSCOPY  1/3/2014    Procedure: COMBINED COLONOSCOPY, SINGLE BIOPSY/POLYPECTOMY BY BIOPSY;  Colonoscopy;  Surgeon: Ed Galvez MD;  Location: [facility]     EXAM UNDER ANESTHESIA, MANIPULATE JOINT (LOCATION) Left 3/30/2016    Procedure: EXAM UNDER ANESTHESIA, MANIPULATE JOINT (LOCATION);  Surgeon: Justen Ford MD;  Location: [facility]     SURGICAL HISTORY OF -   08/97    appendectomy ([place], [place])         Family History   Problem Relation Age of Onset     C.A.D. Father         50s      Hypertension Father      Atrial fibrillation Father      Hypertension Mother      Cerebrovascular Disease Mother      Atrial fibrillation Mother      Cancer Maternal Grandfather         ?throat/lung cancer     Cerebrovascular Disease Maternal Grandmother      Arthritis Sister         RA     C.A.D. Brother      Atrial fibrillation Brother      Circulatory Other         2 cousins on maternal side .w anurysms     Cancer - colorectal No family hx of      Diabetes No family hx of        Social History     Socioeconomic History     Marital status:      Spouse name: Not on file     Number of children: Not on file     Years of education: Not on file     Highest education level: Not on file   Occupational  History     Occupation:      Employer: [employer]   Tobacco Use     Smoking status: Never Smoker     Smokeless tobacco: Never Used   Substance and Sexual Activity     Alcohol use: Yes     Comment: occ. 2-3 times a month     Drug use: No     Sexual activity: Yes     Partners: Female   Other Topics Concern     Parent/sibling w/ CABG, MI or angioplasty before 65F 55M? No   Social History Narrative     Not on file     Social Determinants of Health     Financial Resource Strain:      Difficulty of Paying Living Expenses:    Food Insecurity:      Worried About Running Out of Food in the Last Year:      Ran Out of Food in the Last Year:    Transportation Needs:      Lack of Transportation (Medical):      Lack of Transportation (Non-Medical):    Physical Activity:      Days of Exercise per Week:      Minutes of Exercise per Session:    Stress:      Feeling of Stress :    Social Connections:      Frequency of Communication with Friends and Family:      Frequency of Social Gatherings with Friends and Family:      Attends Tenriism Services:      Active Member of Clubs or Organizations:      Attends Club or Organization Meetings:      Marital Status:    Intimate Partner Violence:      Fear of Current or Ex-Partner:      Emotionally Abused:      Physically Abused:      Sexually Abused:        Outpatient Encounter Medications as of 7/12/2021   Medication Sig Dispense Refill     ASPIRIN 81 MG OR TABS 1 tab po QD (Once per day) 100 3     atorvastatin (LIPITOR) 10 MG tablet Take 1 tablet (10 mg) by mouth every other day 45 tablet 3     blood glucose (NO BRAND SPECIFIED) test strip Check BS 4-5x/day.  Dispense 3 month supply. Accu-Check Compact Plus. 510 each 11     glucagon (GLUCAGON EMERGENCY) 1 MG kit Inject 1 mg into the muscle once for 1 dose 1 mg 11     insulin glargine (LANTUS VIAL) 100 UNIT/ML vial INJECT 2 TO 5 UNITS SUBCUTANEOUSLY AT BEDTIME 10 mL 11     insulin lispro (HUMALOG) 100 UNIT/ML vial INJECT 2-3  "UNITS IN THE MORNING, 2-3 AT LUNCH, AND 2-4 IN THE EVENING AND SLIDING SCALE. ESTIMATED MAXIMUM UNITS DAILY IS 25 UNITS PER DAY 10 mL 11     insulin syringe-needle U-100 (BD VEO INSULIN SYRINGE U/F) 31G X 15/64\" 0.3 ML Use 4 syringes daily or as directed. 120 each 11     insulin syringes, disposable, U-100 0.3 ML MISC 1 Device 4 times daily 100 each 11     lisinopril (PRINIVIL/ZESTRIL) 10 MG tablet Take 1 tablet (10 mg) by mouth daily 90 tablet 3     order for DME Equipment being ordered: Continue glucose monitor - Dexcom G4 - , transmitter, sensor 1 each prn     TRUEPLUS LANCETS 33G MISC 1 Device 4 times daily 100 each 11     No facility-administered encounter medications on file as of 7/12/2021.             O:   NAD, WDWN, Alert & Oriented, Mood & Affect wnl, Vitals stable   Here today alone   /78   Pulse 60   SpO2 100%    General appearance normal   Vitals stable   Alert, oriented and in no acute distress      Following lymph nodes palpated: Occipital, Cervical, Supraclavicular no lad   R brow inflamed seborrheic keratosis      Stuck on papules and brown macules on trunk and ext   Red papules on trunk  Flesh colored papules on trunk  L lat tongue skin colored 2mm papule      The remainder of the full exam was normal; the following areas were examined:  conjunctiva/lids, oral mucosa, neck, peripheral vascular system, abdomen, lymph nodes, digits/nails, eccrine and apocrine glands, scalp/hair, face, neck, chest, abdomen, buttocks, back, RUE, LUE, RLE, LLE       Eyes: Conjunctivae/lids:Normal     ENT: Lips, buccal mucosa, tongue: normal    MSK:Normal    Cardiovascular: peripheral edema none    Pulm: Breathing Normal    Lymph Nodes: No Head and Neck Lymphadenopathy     Neuro/Psych: Orientation:Alert and Orientedx3 ; Mood/Affect:normal       A/P:  1. Seborrheic keratosis, lentigo, angioma, dermal nevus  2. neruoma   2. R brow inflamed seborrheic keratosis   LN2:  Treated with LN2 for 5s for 1-2 cycles. " Warned risks of blistering, pain, pigment change, scarring, and incomplete resolution.  Advised patient to return if lesions do not completely resolve.  Wound care sheet given.    It was a pleasure speaking to Rangel Singh today.  Previous clinic notes and pertinent laboratory tests were reviewed prior to Rangel Singh's visit.    Nature and genetics of benign skin lesions dicussed with patient.  Signs and Symptoms of skin cancer discussed with patient.  Patient encouraged to perform monthly skin exams.  UV precautions reviewed with patient.  Risks of non-melanoma skin cancer discussed with patient   Return to clinic 12 months

## 2021-10-23 ENCOUNTER — HEALTH MAINTENANCE LETTER (OUTPATIENT)
Age: 56
End: 2021-10-23

## 2021-10-27 ENCOUNTER — MYC MEDICAL ADVICE (OUTPATIENT)
Dept: FAMILY MEDICINE | Facility: CLINIC | Age: 56
End: 2021-10-27

## 2021-10-27 DIAGNOSIS — D72.9 ABNORMAL WBC COUNT: Primary | ICD-10-CM

## 2021-10-27 NOTE — TELEPHONE ENCOUNTER
Dr Boogie  See [facility] message  Pt concerned about chronic low wbc  Wondering if he should be concerned  Please advise    Barrett Lim RN

## 2021-11-01 ENCOUNTER — LAB (OUTPATIENT)
Dept: LAB | Facility: CLINIC | Age: 56
End: 2021-11-01
Payer: COMMERCIAL

## 2021-11-01 DIAGNOSIS — D72.9 ABNORMAL WBC COUNT: ICD-10-CM

## 2021-11-01 LAB
BASOPHILS # BLD AUTO: 0 10E3/UL (ref 0–0.2)
BASOPHILS NFR BLD AUTO: 0 %
EOSINOPHIL # BLD AUTO: 0.3 10E3/UL (ref 0–0.7)
EOSINOPHIL NFR BLD AUTO: 6 %
ERYTHROCYTE [DISTWIDTH] IN BLOOD BY AUTOMATED COUNT: 13.5 % (ref 10–15)
HCT VFR BLD AUTO: 40.6 % (ref 40–53)
HGB BLD-MCNC: 13.5 G/DL (ref 13.3–17.7)
LYMPHOCYTES # BLD AUTO: 1.3 10E3/UL (ref 0.8–5.3)
LYMPHOCYTES NFR BLD AUTO: 27 %
MCH RBC QN AUTO: 31.2 PG (ref 26.5–33)
MCHC RBC AUTO-ENTMCNC: 33.3 G/DL (ref 31.5–36.5)
MCV RBC AUTO: 94 FL (ref 78–100)
MONOCYTES # BLD AUTO: 0.6 10E3/UL (ref 0–1.3)
MONOCYTES NFR BLD AUTO: 13 %
NEUTROPHILS # BLD AUTO: 2.5 10E3/UL (ref 1.6–8.3)
NEUTROPHILS NFR BLD AUTO: 53 %
PLATELET # BLD AUTO: 161 10E3/UL (ref 150–450)
RBC # BLD AUTO: 4.33 10E6/UL (ref 4.4–5.9)
WBC # BLD AUTO: 4.6 10E3/UL (ref 4–11)

## 2021-11-01 PROCEDURE — 36415 COLL VENOUS BLD VENIPUNCTURE: CPT

## 2021-11-01 PROCEDURE — 85025 COMPLETE CBC W/AUTO DIFF WBC: CPT

## 2022-02-12 ENCOUNTER — HEALTH MAINTENANCE LETTER (OUTPATIENT)
Age: 57
End: 2022-02-12

## 2022-05-01 ENCOUNTER — TRANSFERRED RECORDS (OUTPATIENT)
Dept: MULTI SPECIALTY CLINIC | Facility: CLINIC | Age: 57
End: 2022-05-01

## 2022-05-01 LAB — RETINOPATHY: NORMAL

## 2022-07-05 ENCOUNTER — TRANSFERRED RECORDS (OUTPATIENT)
Dept: MULTI SPECIALTY CLINIC | Facility: CLINIC | Age: 57
End: 2022-07-05

## 2022-07-05 LAB — HBA1C MFR BLD: 5.8 %

## 2022-07-30 ENCOUNTER — HEALTH MAINTENANCE LETTER (OUTPATIENT)
Age: 57
End: 2022-07-30

## 2022-10-09 ENCOUNTER — HEALTH MAINTENANCE LETTER (OUTPATIENT)
Age: 57
End: 2022-10-09

## 2022-10-17 ENCOUNTER — OFFICE VISIT (OUTPATIENT)
Dept: FAMILY MEDICINE | Facility: CLINIC | Age: 57
End: 2022-10-17
Payer: COMMERCIAL

## 2022-10-17 VITALS
BODY MASS INDEX: 26.08 KG/M2 | RESPIRATION RATE: 16 BRPM | OXYGEN SATURATION: 98 % | HEART RATE: 66 BPM | HEIGHT: 70 IN | DIASTOLIC BLOOD PRESSURE: 70 MMHG | WEIGHT: 182.2 LBS | SYSTOLIC BLOOD PRESSURE: 126 MMHG | TEMPERATURE: 97 F

## 2022-10-17 DIAGNOSIS — I10 ESSENTIAL HYPERTENSION: ICD-10-CM

## 2022-10-17 DIAGNOSIS — M54.31 SCIATICA OF RIGHT SIDE: ICD-10-CM

## 2022-10-17 DIAGNOSIS — E78.5 HYPERLIPIDEMIA LDL GOAL <100: Chronic | ICD-10-CM

## 2022-10-17 DIAGNOSIS — Z12.5 SCREENING FOR PROSTATE CANCER: ICD-10-CM

## 2022-10-17 DIAGNOSIS — E10.649 TYPE 1 DIABETES MELLITUS WITH HYPOGLYCEMIA AND WITHOUT COMA (H): ICD-10-CM

## 2022-10-17 DIAGNOSIS — Z00.00 ROUTINE GENERAL MEDICAL EXAMINATION AT A HEALTH CARE FACILITY: Primary | ICD-10-CM

## 2022-10-17 DIAGNOSIS — R97.20 ELEVATED PROSTATE SPECIFIC ANTIGEN (PSA): ICD-10-CM

## 2022-10-17 DIAGNOSIS — E10.319 TYPE 1 DIABETES MELLITUS WITH RETINOPATHY OF BOTH EYES, MACULAR EDEMA PRESENCE UNSPECIFIED, UNSPECIFIED RETINOPATHY SEVERITY (H): ICD-10-CM

## 2022-10-17 LAB — PSA SERPL-MCNC: 4.66 NG/ML (ref 0–3.5)

## 2022-10-17 PROCEDURE — G0103 PSA SCREENING: HCPCS | Performed by: FAMILY MEDICINE

## 2022-10-17 PROCEDURE — 0134A COVID-19,PF,MODERNA BIVALENT: CPT | Performed by: FAMILY MEDICINE

## 2022-10-17 PROCEDURE — 91313 COVID-19,PF,MODERNA BIVALENT: CPT | Performed by: FAMILY MEDICINE

## 2022-10-17 PROCEDURE — 90682 RIV4 VACC RECOMBINANT DNA IM: CPT | Performed by: FAMILY MEDICINE

## 2022-10-17 PROCEDURE — 99396 PREV VISIT EST AGE 40-64: CPT | Mod: 25 | Performed by: FAMILY MEDICINE

## 2022-10-17 PROCEDURE — 90471 IMMUNIZATION ADMIN: CPT | Performed by: FAMILY MEDICINE

## 2022-10-17 PROCEDURE — 99213 OFFICE O/P EST LOW 20 MIN: CPT | Mod: 25 | Performed by: FAMILY MEDICINE

## 2022-10-17 PROCEDURE — 36415 COLL VENOUS BLD VENIPUNCTURE: CPT | Performed by: FAMILY MEDICINE

## 2022-10-17 RX ORDER — MULTIVIT-MIN/IRON/FOLIC ACID/K 18-600-40
CAPSULE ORAL
COMMUNITY

## 2022-10-17 RX ORDER — ATORVASTATIN CALCIUM 10 MG/1
20 TABLET, FILM COATED ORAL EVERY OTHER DAY
COMMUNITY
Start: 2022-10-17 | End: 2022-11-29

## 2022-10-17 ASSESSMENT — PAIN SCALES - GENERAL: PAINLEVEL: MILD PAIN (3)

## 2022-10-17 NOTE — Clinical Note
Please abstract the following data from this visit with this patient into the appropriate field in Epic:  Tests that can be patient reported without a hard copy:  Eye exam with ophthalmology on this date: May 1st, 2022.

## 2022-10-17 NOTE — PATIENT INSTRUCTIONS
Please go to lab.    I will get your labs abstracted into our Epic system.    I do think you have right sciatica with radicular symptoms.    Please use the muscle relaxant. Please go to physical therapy first and use the medication.   If you are not getting better please let me know and I will get an MRI scan of your back.          Preventive Health Recommendations  Male Ages 50 - 64    Yearly exam:             See your health care provider every year in order to  o   Review health changes.   o   Discuss preventive care.    o   Review your medicines if your doctor has prescribed any.   Have a cholesterol test every 5 years, or more frequently if you are at risk for high cholesterol/heart disease.   Have a diabetes test (fasting glucose) every three years. If you are at risk for diabetes, you should have this test more often.   Have a colonoscopy at age 50, or have a yearly FIT test (stool test). These exams will check for colon cancer.    Talk with your health care provider about whether or not a prostate cancer screening test (PSA) is right for you.  You should be tested each year for STDs (sexually transmitted diseases), if you re at risk.     Shots: Get a flu shot each year. Get a tetanus shot every 10 years.     Nutrition:  Eat at least 5 servings of fruits and vegetables daily.   Eat whole-grain bread, whole-wheat pasta and brown rice instead of white grains and rice.   Get adequate Calcium and Vitamin D.     Lifestyle  Exercise for at least 150 minutes a week (30 minutes a day, 5 days a week). This will help you control your weight and prevent disease.   Limit alcohol to one drink per day.   No smoking.   Wear sunscreen to prevent skin cancer.   See your dentist every six months for an exam and cleaning.   See your eye doctor every 1 to 2 years.

## 2022-10-17 NOTE — PROGRESS NOTES
SUBJECTIVE:   CC: Escobar is an 57 year old who presents for preventative health visit.     Patient has been advised of split billing requirements and indicates understanding: Yes  Healthy Habits:    Getting at least 3 servings of Calcium per day:  Yes    Bi-annual eye exam:  Yes    Dental care twice a year:  Yes    Sleep apnea or symptoms of sleep apnea:  None    Diet:  Regular (no restrictions)    Frequency of exercise:  4-5 days/week    Duration of exercise:  Greater than 60 minutes    Taking medications regularly:  Yes    Barriers to taking medications:  None    Medication side effects:  None    PHQ-2 Total Score:    Additional concerns today:  Yes (hip)      Pain History:  When did you first notice your pain? - Post-Acute Pain   Have you seen any provider previously for this issue? No  How has your pain affected your ability to work? Not applicable  Where in your body do you have pain? Musculoskeletal problem/pain  Onset/Duration: x 1.5 month  Description  Location: Right hip. Patient is thinking it is a nerve issue.  Pain started on the back side of right hip. Lower back and travels down into his right calf.   Joint Swelling: No  Redness: No  Pain: YES- sharp stabbing in the back of hip and constant pain in right leg.  Warmth: No  Intensity:  mild  Progression of Symptoms: worsening  Accompanying signs and symptoms:   Fevers: No  Numbness/tingling/weakness: YES- weakness in right leg. Once in a while.  History  Trauma to the area: No. But was tearing shingles off of a roof and a few weeks after that his back started hurting.  Recent illness:  No  Previous similar problem: No  Previous evaluation:  No  Precipitating or alleviating factors:  Aggravating factors include: sitting ( the movement from standing to sitting). Is the worst.  Therapies tried and outcome: heat, Ibuprofen and Naproxen- helps but doesn't last.        Today's PHQ-2 Score:   PHQ-2 ( 1999 Pfizer) 10/17/2022   Q1: Little interest or pleasure in  "doing things 0   Q2: Feeling down, depressed or hopeless 0   PHQ-2 Score 0   PHQ-2 Total Score (12-17 Years)- Positive if 3 or more points; Administer PHQ-A if positive -   Q1: Little interest or pleasure in doing things -   Q2: Feeling down, depressed or hopeless -   PHQ-2 Score -       Abuse: Current or Past(Physical, Sexual or Emotional)- No  Do you feel safe in your environment? Yes        Social History     Tobacco Use     Smoking status: Never     Smokeless tobacco: Never   Substance Use Topics     Alcohol use: Yes     Comment: occ. 2-3 times a month     If you drink alcohol do you typically have >3 drinks per day or >7 drinks per week? No    No flowsheet data found.    Last PSA:   PSA   Date Value Ref Range Status   06/14/2021 3.09 0 - 4 ug/L Final     Comment:     Assay Method:  Chemiluminescence using Siemens Vista analyzer       Reviewed orders with patient. Reviewed health maintenance and updated orders accordingly - Yes      Reviewed and updated as needed this visit by clinical staff   Tobacco  Allergies  Meds   Med Hx  Surg Hx  Fam Hx  Soc Hx        Reviewed and updated as needed this visit by Provider                     Review of Systems  Review Of Systems  Skin: negative  Eyes: has seen eye doctor  Ears/Nose/Throat: negative  Respiratory: No shortness of breath, dyspnea on exertion, cough, or hemoptysis  Cardiovascular: negative  Gastrointestinal: negative  Genitourinary: negative  Musculoskeletal: as above  Neurologic: as above  Psychiatric: negative  Hematologic/Lymphatic/Immunologic: negative  Endocrine: seeing endocrinology      OBJECTIVE:   /70 (BP Location: Right arm, Patient Position: Sitting, Cuff Size: Adult Large)   Pulse 66   Temp 97  F (36.1  C) (Tympanic)   Resp 16   Ht 1.78 m (5' 10.08\")   Wt 82.6 kg (182 lb 3.2 oz)   SpO2 98%   BMI 26.08 kg/m      Physical Exam  GENERAL: healthy, alert and no distress  EYES: Eyes grossly normal to inspection, PERRL and conjunctivae " and sclerae normal  HENT: ear canals and TM's normal, nose and mouth without ulcers or lesions  NECK: no adenopathy, no asymmetry, masses, or scars and thyroid normal to palpation  RESP: lungs clear to auscultation - no rales, rhonchi or wheezes  CV: regular rate and rhythm, normal S1 S2, no S3 or S4, no murmur, click or rub, no peripheral edema and peripheral pulses strong  ABDOMEN: soft, nontender, no hepatosplenomegaly, no masses and bowel sounds normal   (male): normal male genitalia without lesions or urethral discharge, no hernia  MS: Patient is pleasant, cooperative and in no acute distress.  Back:  Rangel Singh had mild difficulty moving from the chair to the exam table.  no tenderness to palpation in the right lumbar region.  No skin changes to the area.    no tenderness to the buttock area.  Straight leg raise was slight positive on right at 60 degrees but in right buttock.  Flexion at hip was 60 degrees. Able to hyperextend and lean from side to side.  Muscle/Skeletal:  Strength was 5/5 of lower extremities.  Neuro: Reflexes were 2/4 bilaterally at patella and Achilles.    SKIN: no suspicious lesions or rashes  NEURO: Normal strength and tone, mentation intact and speech normal  PSYCH: mentation appears normal, affect normal/bright  LYMPH: no cervical adenopathy  Diabetic foot exam: normal DP and PT pulses, no trophic changes or ulcerative lesions and normal sensory exam    Reviewed labs from outside, [place]    ASSESSMENT/PLAN:   (Z00.00) Routine general medical examination at a health care facility  (primary encounter diagnosis)  Comment: Discussed healthy lifestyle and preventative cares.    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  Comment:   Plan: atorvastatin (LIPITOR) 10 MG tablet            (I10) Essential hypertension  Comment: controlled  Plan: CANCELED: BASIC METABOLIC PANEL            (E78.5) Hyperlipidemia LDL goal <100  Comment: controlled  Plan: atorvastatin (LIPITOR) 10 MG tablet, CANCELED:         Lipid panel reflex to direct LDL Non-fasting            (Z12.5) Screening for prostate cancer  Comment:   Plan: Prostate Specific Antigen Screen            (M54.31) Sciatica of right side  Comment: do PT, muscle relaxant, information given, if not better will get MRI of his back, lumbar region  Plan: tiZANidine (ZANAFLEX) 4 MG tablet, Physical         Therapy Referral              Patient has been advised of split billing requirements and indicates understanding: Yes    COUNSELING:   Reviewed preventive health counseling, as reflected in patient instructions       Regular exercise       Healthy diet/nutrition       Vision screening       Colorectal cancer screening       Prostate cancer screening    Estimated body mass index is 26.08 kg/m  as calculated from the following:    Height as of this encounter: 1.78 m (5' 10.08\").    Weight as of this encounter: 82.6 kg (182 lb 3.2 oz).         He reports that he has never smoked. He has never used smokeless tobacco.      Counseling Resources:  ATP IV Guidelines  Pooled Cohorts Equation Calculator  FRAX Risk Assessment  ICSI Preventive Guidelines  Dietary Guidelines for Americans, 2010  USDA's MyPlate  ASA Prophylaxis  Lung CA Screening    Patel Boogie MD  [facility]  "

## 2022-10-18 ENCOUNTER — HOSPITAL ENCOUNTER (OUTPATIENT)
Dept: PHYSICAL THERAPY | Facility: CLINIC | Age: 57
Setting detail: THERAPIES SERIES
Discharge: HOME | End: 2022-10-18
Attending: FAMILY MEDICINE
Payer: COMMERCIAL

## 2022-10-18 DIAGNOSIS — M54.31 SCIATICA OF RIGHT SIDE: ICD-10-CM

## 2022-10-18 PROCEDURE — 97110 THERAPEUTIC EXERCISES: CPT | Mod: GP | Performed by: PHYSICAL THERAPIST

## 2022-10-18 PROCEDURE — 97162 PT EVAL MOD COMPLEX 30 MIN: CPT | Mod: GP | Performed by: PHYSICAL THERAPIST

## 2022-10-18 NOTE — PROGRESS NOTES
10/18/22 1400   General Information   Type of Visit Initial OP Ortho PT Evaluation   Start of Care Date 10/18/22   Referring Physician Patel Boogie MD   Patient/Family Goals Statement So it doesn't hurt anymore   Orders Evaluate and Treat   Date of Order 10/17/22   Medical Diagnosis R sciatica   Body Part(s)   Body Part(s) Lumbar Spine/SI   Presentation and Etiology   Pertinent history of current problem (include personal factors and/or comorbidities that impact the POC) Pt did shingling in Aug but had no problems at that time.  Pt noted in mid Sept he developed R buttock pain ( no LBP)--no injury.  Pt was running 6-8 miles w/o problems.   Currently intermittent pain R buttock which will radiate down leg to ankle --pt reports constant pain in R lateral lower leg.   Pain @ best 2/10,  @ worst 4/10.   Slight LBP w/ stand to sit.   Neg bowel/ bladder/ numbness/ tingling.  Pt does report weakness w/ shooting pain.   + cough/ sneeze. No tests.   Meds: started mm relaxer last night helped some but sore by 3 am.  Ibuprofen.   PMHX:  diabetes,  frozen shoulder.  Mod : comorbidities   Impairments A. Pain;B. Decreased WB tolerance;D. Decreased ROM;E. Decreased flexibility;F. Decreased strength and endurance   Pain quality A. Sharp;B. Dull;C. Aching;E. Shooting;F. Stabbing   Pain exacerbation comment Sitting on hard chair 1 hour, soft couch 15-20 min.   Bending to put on R sock/shoe, taking pants off.   Waking after 4-5 hours then unable to get comfortable to sleep.  Worse in am.   Pain/symptoms eased by B. Walking;E. Changing positions   Progression of symptoms since onset: Worsened  (more painful and more limiting)   Prior Level of Function   Functional Level Prior Comment Runs 3X/week 1 hour.   Has not ran in 2 weeks.  Currently goes for 2 mile walks w/ dog --notes mild irritation in LE   Current Level of Function   Patient role/employment history F. Retired   Fall Risk Screen   Fall screen completed by PT   Have  you fallen 2 or more times in the past year? No   Have you fallen and had an injury in the past year? No   Is patient a fall risk? No   Abuse Screen (yes response referral indicated)   Feels Unsafe at Home or Work/School no   Feels Threatened by Someone no   Does Anyone Try to Keep You From Having Contact with Others or Doing Things Outside Your Home? no   Physical Signs of Abuse Present no   Lumbar Spine/SI Objective Findings   Gait/Locomotion WNL,  Toe walk neg,  able to heel walk but increases pain   Hamstring Flexibility mild to mod tightness B   Flexion ROM 50% to knees and walks self back up   Extension ROM 10% notes pain above the hip   Right Side Bending ROM 50%   Left Side Bending ROM 50%   Pelvic Screen Neg  FB test in limited ROM   Hip Screen PROM ER R 17* w/ increased symptoms, L 20* (pt notes chronic tightness),  IR R 14*, L 15*.   Hip Flexion (L2) Strength R attempt to lift leg increased pain,   lifting L LE caused R sided pain   Knee Flexion Strength R 4/5, L 5/5   Knee Extension (L3) Strength R 4/5, L 5/5   Ankle Dorsiflexion (L4) Strength B 5/5   Great Toe Extension (L5) Strength B 5/5   SLR R +,  L neg   Crossover SLR neg B   Repeated Extension Prone prone lying:  no pain anywhere.  CARLEEN increases pain in R PSIS area.  PPU 20% limited mobility and increased pain in calf.   Segmental Mobility Unable to assess ERS/ FRS due to limited mobility.  Mild PA stiffness--no pain lumbar spine or SI   Palpation Mild tenderness R piriformis   Slump Test R +, L neg   Observation Uncomfortable w/ sitting,  shifting positions and needed  to stand within 15 min.   Posture PSIS/ crest level   Planned Therapy Interventions   Planned Therapy Interventions manual therapy;neuromuscular re-education;ROM;strengthening;stretching  (body mechanics)   Planned Modality Interventions   Planned Modality Interventions TENS;Traction   Clinical Impression   Criteria for Skilled Therapeutic Interventions Met yes, treatment indicated    PT Diagnosis R sciatica   Influenced by the following impairments pain, decreased mobility, decreased strength   Functional limitations due to impairments sitting, sleeping, bending   Clinical Presentation Evolving/Changing   Clinical Presentation Rationale pt reports increasing symptoms   Clinical Decision Making (Complexity) Moderate complexity   Therapy Frequency 1 time/week   Predicted Duration of Therapy Intervention (days/wks) 8 weeks   Risk & Benefits of therapy have been explained Yes   Patient, Family & other staff in agreement with plan of care Yes   Education Assessment   Preferred Learning Style Listening;Reading;Demonstration;Pictures/video   Barriers to Learning No barriers   ORTHO GOALS   PT Ortho Eval Goals 1;2;3;4   Ortho Goal 1   Goal Identifier 1.   Goal Description Pt will be able to consistently sit X 30 min w/infrequent shifting   Target Date 12/17/22   Ortho Goal 2   Goal Identifier 2.   Goal Description Pt will be able to bend forward to put on R shoe/ sock w/ pain no > 3/10   Target Date 12/17/22   Ortho Goal 3   Goal Identifier 3.   Goal Description Pt will consistently sleep 6 to 6.5 hours before waking due to R LE symptoms   Target Date 12/17/22   Ortho Goal 4   Goal Identifier 4.   Goal Description Pt will be independent and consistent w/HEP and have increased awareness of body mechanics   Target Date 12/17/22   Total Evaluation Time   PT Eval, Moderate Complexity Minutes (01883) 30     Thank you for this referral,    Hiral Calvo, PT,   #[id number]  [facility]  [phone]

## 2022-11-02 ENCOUNTER — HOSPITAL ENCOUNTER (OUTPATIENT)
Dept: PHYSICAL THERAPY | Facility: CLINIC | Age: 57
Setting detail: THERAPIES SERIES
Discharge: HOME | End: 2022-11-02
Attending: FAMILY MEDICINE
Payer: COMMERCIAL

## 2022-11-02 PROCEDURE — 97110 THERAPEUTIC EXERCISES: CPT | Mod: GP | Performed by: PHYSICAL THERAPIST

## 2022-11-02 PROCEDURE — 97140 MANUAL THERAPY 1/> REGIONS: CPT | Mod: GP | Performed by: PHYSICAL THERAPIST

## 2022-11-03 DIAGNOSIS — M54.16 LUMBAR BACK PAIN WITH RADICULOPATHY AFFECTING RIGHT LOWER EXTREMITY: Primary | ICD-10-CM

## 2022-11-03 DIAGNOSIS — R29.898 WEAKNESS OF RIGHT LOWER EXTREMITY: ICD-10-CM

## 2022-11-03 NOTE — PROGRESS NOTES
Please call Ray,  I did order an MRI scan.  I will see if his insurance will approve it.  Please let him to know how to make an appointment to get the MRI scan.  Patel Boogie MD  Family Medicine

## 2022-11-08 ENCOUNTER — HOSPITAL ENCOUNTER (OUTPATIENT)
Dept: PHYSICAL THERAPY | Facility: CLINIC | Age: 57
Setting detail: THERAPIES SERIES
Discharge: HOME | End: 2022-11-08
Attending: FAMILY MEDICINE
Payer: COMMERCIAL

## 2022-11-08 PROCEDURE — 97140 MANUAL THERAPY 1/> REGIONS: CPT | Mod: GP | Performed by: PHYSICAL THERAPIST

## 2022-11-10 ENCOUNTER — TELEPHONE (OUTPATIENT)
Dept: FAMILY MEDICINE | Facility: CLINIC | Age: 57
End: 2022-11-10

## 2022-11-10 ENCOUNTER — HOSPITAL ENCOUNTER (OUTPATIENT)
Dept: MRI IMAGING | Facility: CLINIC | Age: 57
Discharge: HOME | End: 2022-11-10
Attending: FAMILY MEDICINE | Admitting: FAMILY MEDICINE
Payer: COMMERCIAL

## 2022-11-10 DIAGNOSIS — R29.898 WEAKNESS OF RIGHT LOWER EXTREMITY: ICD-10-CM

## 2022-11-10 DIAGNOSIS — M54.16 LUMBAR BACK PAIN WITH RADICULOPATHY AFFECTING RIGHT LOWER EXTREMITY: Primary | ICD-10-CM

## 2022-11-10 DIAGNOSIS — M51.16 LUMBAR DISC HERNIATION WITH RADICULOPATHY: ICD-10-CM

## 2022-11-10 DIAGNOSIS — M54.16 LUMBAR BACK PAIN WITH RADICULOPATHY AFFECTING RIGHT LOWER EXTREMITY: ICD-10-CM

## 2022-11-10 PROCEDURE — 72148 MRI LUMBAR SPINE W/O DYE: CPT

## 2022-11-10 NOTE — TELEPHONE ENCOUNTER
Reason for call:  Results   Name of test or procedure: MRI  Date of test or procedure: 11/10/22  Location of test or procedure: [facility] [facility]     Additional comments: Patient calling for MRI results

## 2022-11-14 ENCOUNTER — MYC MEDICAL ADVICE (OUTPATIENT)
Dept: FAMILY MEDICINE | Facility: CLINIC | Age: 57
End: 2022-11-14

## 2022-11-14 ENCOUNTER — HOSPITAL ENCOUNTER (OUTPATIENT)
Dept: PHYSICAL THERAPY | Facility: CLINIC | Age: 57
Setting detail: THERAPIES SERIES
Discharge: HOME | End: 2022-11-14
Attending: FAMILY MEDICINE
Payer: COMMERCIAL

## 2022-11-14 DIAGNOSIS — M54.16 LUMBAR BACK PAIN WITH RADICULOPATHY AFFECTING RIGHT LOWER EXTREMITY: Primary | ICD-10-CM

## 2022-11-14 DIAGNOSIS — M51.16 LUMBAR DISC HERNIATION WITH RADICULOPATHY: ICD-10-CM

## 2022-11-14 PROCEDURE — 97140 MANUAL THERAPY 1/> REGIONS: CPT | Mod: GP | Performed by: PHYSICAL THERAPIST

## 2022-11-15 NOTE — TELEPHONE ENCOUNTER
Please call Ray,  I put a referral in for [facility], they have multiple locations, which I think is formally [facility].  It was in our network according to Epic.  I have sent some patients there and they had better access.  Sincerely,  Patel Boogie MD

## 2022-11-15 NOTE — TELEPHONE ENCOUNTER
Dr. Boogie,    Patient sends my chart message with await to get into pain management.  Suggested maybe [facility] or [facility].  Please advise. Alena LINCOLN RN

## 2022-11-15 NOTE — TELEPHONE ENCOUNTER
Reached wife of patient.  Patient is out for a walk.  Asked for her to have the patient call us back.  Wife wants to know what number to call.  I have given her our main number and then she tells me yes after we speak to like 10 people and get passed around.  I have advised she call the [phone] number and ask for the RN.  I have apologized as I am not allowed to give out our direct numbers.  Wife states that's ok I don't want it anyway. Alena LINCOLN RN

## 2022-11-15 NOTE — TELEPHONE ENCOUNTER
Pt called back and was given Dr Boogie's message and phone number for [facility]  Pain clinic. Alena Allen RN

## 2022-11-16 ENCOUNTER — MYC MEDICAL ADVICE (OUTPATIENT)
Dept: FAMILY MEDICINE | Facility: CLINIC | Age: 57
End: 2022-11-16

## 2022-11-16 DIAGNOSIS — M51.16 LUMBAR DISC HERNIATION WITH RADICULOPATHY: ICD-10-CM

## 2022-11-16 DIAGNOSIS — M54.16 LUMBAR BACK PAIN WITH RADICULOPATHY AFFECTING RIGHT LOWER EXTREMITY: Primary | ICD-10-CM

## 2022-11-16 NOTE — TELEPHONE ENCOUNTER
I did a referral to ortho  however be aware that the surgeon will likely start conservatively.    Insurance and/or the surgeon may not start of with surgery.    Studies have shown that there is no advantage to doing surgery first.    Patel Boogie MD  Family Medicine

## 2022-11-22 NOTE — TELEPHONE ENCOUNTER
DIAGNOSIS: Lumbar back pain with radiculopathy affecting right lower extremity /Lumbar disc herniation with radiculopathy   APPOINTMENT DATE: 11/28/2022   NOTES STATUS DETAILS   OFFICE NOTE from referring provider SELF    OFFICE NOTE from other specialist Internal  Care Everywhere 11/14/2022 to 10/18/2022 - [facility]    10/17/2022 - Patel Boogie MD - [facility]    01/18/2018- Lloyd Hutchins MD- [facility]    12/14/2017 - Breann Farias - [facility]   MEDICATION LIST Internal    LABS     CBC/DIFF Care Everywhere 11/01/2021   MRI Internal Internal:  11/10/2022 - [facility]

## 2022-11-23 ENCOUNTER — HOSPITAL ENCOUNTER (OUTPATIENT)
Dept: PHYSICAL THERAPY | Facility: CLINIC | Age: 57
Setting detail: THERAPIES SERIES
Discharge: HOME | End: 2022-11-23
Attending: FAMILY MEDICINE
Payer: COMMERCIAL

## 2022-11-23 PROCEDURE — 97140 MANUAL THERAPY 1/> REGIONS: CPT | Mod: GP | Performed by: PHYSICAL THERAPIST

## 2022-11-27 ASSESSMENT — ENCOUNTER SYMPTOMS
SPEECH CHANGE: 0
LOSS OF CONSCIOUSNESS: 0
NUMBNESS: 1
HEADACHES: 0
MUSCLE CRAMPS: 1
PARALYSIS: 0
MYALGIAS: 1
WEAKNESS: 1
STIFFNESS: 1
SEIZURES: 0
DIZZINESS: 1
TINGLING: 1
MEMORY LOSS: 0
TREMORS: 0
BACK PAIN: 1
ARTHRALGIAS: 1
DISTURBANCES IN COORDINATION: 1
MUSCLE WEAKNESS: 1

## 2022-11-28 ENCOUNTER — TELEPHONE (OUTPATIENT)
Dept: PALLIATIVE MEDICINE | Facility: CLINIC | Age: 57
End: 2022-11-28

## 2022-11-28 ENCOUNTER — PRE VISIT (OUTPATIENT)
Dept: ORTHOPEDICS | Facility: CLINIC | Age: 57
End: 2022-11-28

## 2022-11-28 ENCOUNTER — ANCILLARY PROCEDURE (OUTPATIENT)
Dept: GENERAL RADIOLOGY | Facility: CLINIC | Age: 57
End: 2022-11-28
Attending: ORTHOPAEDIC SURGERY
Payer: COMMERCIAL

## 2022-11-28 ENCOUNTER — OFFICE VISIT (OUTPATIENT)
Dept: ORTHOPEDICS | Facility: CLINIC | Age: 57
End: 2022-11-28
Payer: COMMERCIAL

## 2022-11-28 VITALS — WEIGHT: 187 LBS | HEIGHT: 71 IN | BODY MASS INDEX: 26.18 KG/M2

## 2022-11-28 DIAGNOSIS — M54.16 LUMBAR BACK PAIN WITH RADICULOPATHY AFFECTING RIGHT LOWER EXTREMITY: ICD-10-CM

## 2022-11-28 DIAGNOSIS — M51.16 LUMBAR DISC HERNIATION WITH RADICULOPATHY: Primary | ICD-10-CM

## 2022-11-28 DIAGNOSIS — M54.50 LOW BACK PAIN: Primary | ICD-10-CM

## 2022-11-28 PROCEDURE — 72110 X-RAY EXAM L-2 SPINE 4/>VWS: CPT | Mod: TC | Performed by: RADIOLOGY

## 2022-11-28 PROCEDURE — 99204 OFFICE O/P NEW MOD 45 MIN: CPT | Performed by: ORTHOPAEDIC SURGERY

## 2022-11-28 RX ORDER — PREGABALIN 50 MG/1
50 CAPSULE ORAL 3 TIMES DAILY
Qty: 90 CAPSULE | Refills: 3 | Status: SHIPPED | OUTPATIENT
Start: 2022-11-28 | End: 2023-02-02

## 2022-11-28 NOTE — NURSING NOTE
"Reason For Visit:   Chief Complaint   Patient presents with     Consult     Lumbar back pain with radiculopathy affecting right lower extremity /Lumbar disc herniation with radiculopathy /Dr Boogie       Primary MD: Patel Boogie  Ref. MD: Dr. Boogie    ?  No  Occupation retired.    Date of injury: No  Type of injury: No.    Date of surgery: No  Type of surgery: No.    Smoker: No  Request smoking cessation information: No    Ht 1.8 m (5' 10.87\")   Wt 84.8 kg (187 lb)   BMI 26.18 kg/m      Pain Assessment  Patient Currently in Pain: Yes  0-10 Pain Scale: 5  Primary Pain Location: Back    Oswestry (TREVON) Questionnaire    OSWESTRY DISABILITY INDEX 11/27/2022   Count 9   Sum 23   Oswestry Score (%) 51.11   Some recent data might be hidden        Visual Analog Pain Scale  Back Pain Scale 0-10: 4.5  Right leg pain: 4.5  Left leg pain: 0  Neck Pain Scale 0-10: 0  Right arm pain: 0  Left arm pain: 0    Promis 10 Assessment    PROMIS 10 11/27/2022   In general, would you say your health is: Good   In general, would you say your quality of life is: Very good   In general, how would you rate your physical health? Good   In general, how would you rate your mental health, including your mood and your ability to think? Very good   In general, how would you rate your satisfaction with your social activities and relationships? Very good   In general, please rate how well you carry out your usual social activities and roles Good   To what extent are you able to carry out your everyday physical activities such as walking, climbing stairs, carrying groceries, or moving a chair? Moderately   How often have you been bothered by emotional problems such as feeling anxious, depressed or irritable? Never   How would you rate your fatigue on average? Mild   How would you rate your pain on average?   0 = No Pain  to  10 = Worst Imaginable Pain 6   In general, would you say your health is: 3   In general, would you say " your quality of life is: 4   In general, how would you rate your physical health? 3   In general, how would you rate your mental health, including your mood and your ability to think? 4   In general, how would you rate your satisfaction with your social activities and relationships? 4   In general, please rate how well you carry out your usual social activities and roles. (This includes activities at home, at work and in your community, and responsibilities as a parent, child, spouse, employee, friend, etc.) 3   To what extent are you able to carry out your everyday physical activities such as walking, climbing stairs, carrying groceries, or moving a chair? 3   In the past 7 days, how often have you been bothered by emotional problems such as feeling anxious, depressed, or irritable? 1   In the past 7 days, how would you rate your fatigue on average? 2   In the past 7 days, how would you rate your pain on average, where 0 means no pain, and 10 means worst imaginable pain? 6   Global Mental Health Score 17   Global Physical Health Score 13   PROMIS TOTAL - SUBSCORES 30   Some recent data might be hidden                Tiki Espinoza LPN

## 2022-11-28 NOTE — LETTER
11/28/2022         RE: Rangel Singh  [id number]  [place] [address]        Dear Colleague,    Thank you for referring your patient, Rangel Singh, to the [facility]. Please see a copy of my visit note below.    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  Since that time has been experiencing right buttock with radiation down lateral calf consistent with L5 distribution, numbness on bottom of foot.  He reports sitting is excrucitating, bending forward exacerbates his symptoms.  Symptoms are alleviated with short walking and moving.  He has had leg buckling when going up or down stairs.  Has been in physical therapy but based on his description the exercises provided were quite strenuous and focus more towards core strengthening and exacerbated his symptoms.  No bowel or bladder dysfunction and no saddle anesthesia    Treatments thus far:  Prescription NSAID, tizanidine neither of which have helped much, lidocaine patches  Physical therapy which exacerbate symptoms  Has not had gabapentin or Neurontin  Has not had injections    PMH  Diabetes Mellitus type I (A1c runs 5.5 to 6)    Objective:  Examination is alert and oriented, no acute distress but has difficulty sitting and needs to keep pacing due to severity of symptoms  Is able to walk without assistive device but clearly has antalgic gait.  He is able to walk on tiptoes and heels.  Resisted motor strength testing reveals 5 -/5 pedal's anterior in the right otherwise he is 5/5 throughout the lower extremities.  Sensation subjectively decreased S1 distribution.  Otherwise sensation is intact to gliding light touch bilaterally.  Deferred straight leg testing giving is obvious tension signs.  Reflexes are maintained    Imaging review:  AP lateral flexion-extension views of the  lumbar spine demonstrates grade 1 anterolisthesis of L3 on 4 and a corresponding retrolisthesis of L4 on 5.  There is no evidence of dynamic instability however it looks like his flexion extension was somewhat guarded by symptoms so may not be optimal comparison    MRI lumbar spine which was recently obtained demonstrates a very large right paracentral disc herniation impinging upon the traversing L5 nerve root and contacting the traversing S1 nerve root in the lateral recess.  There is also broad-based disc bulge at L3-4 with moderate stenosis contacting the exiting nerve root.    Impression and plan: 57-year-old male with very large right paracentral disc herniation presenting with subacute right L5 and S1 radiculopathy.     We discussed the options for treatment recommending continued nonoperative measures.  This includes directed physical therapy aimed at core stabilization, strengthening and stretching.  We also discussed the safe use of over-the-counter nonsteroidal anti-inflammatory drugs and the contraindications for these medications.  We reviewed the option of gabapentin which although it is not indicated for all is often helpful in the setting of symptoms associated with neural element compression.  We also reviewed the option of image guided injections which have both therapeutic and diagnostic benefit.  While injections are not a cure for degenerative spinal conditions they can provide several weeks to months of symptomatic relief and can also be used in differential manner to better identify the pathologic cause of symptoms.  When these measures fail and the symptoms are severe enough that the patient is no longer able to perform the level they are comfortable with discussion of surgery becomes a reasonable option.  Placed referral for epidural steroid injection at soonest available appointment.  I do know that there are cc backlog of several weeks for injections within [place] system if this is the  case I will refer him to outside providers to see if we do the injection sooner given severity of his symptoms.  We are not at that point at this time and I recommended discontinued nonoperative measures and will follow up in 6 weeks.    Time spent on this clinical encounter including previsit chart review, history and physical examination, patient counseling and documentation was 45 minutes on the date of encounter.      Stephan Fregoso MD  Orthopedic Spine Surgeon  , Department of Orthopedic Surgery      Answers for HPI/ROS submitted by the patient on 11/27/2022  General Symptoms: No  Skin Symptoms: No  HENT Symptoms: No  EYE SYMPTOMS: No  HEART SYMPTOMS: No  LUNG SYMPTOMS: No  INTESTINAL SYMPTOMS: No  URINARY SYMPTOMS: No  REPRODUCTIVE SYMPTOMS: No  SKELETAL SYMPTOMS: Yes  BLOOD SYMPTOMS: No  NERVOUS SYSTEM SYMPTOMS: Yes  MENTAL HEALTH SYMPTOMS: No  Back pain: Yes  Muscle aches: Yes  Joint pain: Yes  Muscle cramps: Yes  Muscle weakness: Yes  Joint stiffness: Yes  Trouble with coordination: Yes  Dizziness or trouble with balance: Yes  Fainting or black-out spells: No  Memory loss: No  Headache: No  Seizures: No  Speech problems: No  Tingling: Yes  Tremor: No  Weakness: Yes  Difficulty walking: Yes  Paralysis: No  Numbness: Yes          Again, thank you for allowing me to participate in the care of your patient.      Sincerely,    Stephan Fregoso MD

## 2022-11-28 NOTE — TELEPHONE ENCOUNTER
LVM to schedule L4-5, L5-S1 TRANSFORAMINAL EPIDURAL STEROID INJECTION      Per message below to double book        Isis Ramirez    [facility] [place]

## 2022-11-29 ENCOUNTER — VIRTUAL VISIT (OUTPATIENT)
Dept: FAMILY MEDICINE | Facility: CLINIC | Age: 57
End: 2022-11-29
Payer: COMMERCIAL

## 2022-11-29 DIAGNOSIS — U07.1 COVID-19: Primary | ICD-10-CM

## 2022-11-29 PROCEDURE — 99213 OFFICE O/P EST LOW 20 MIN: CPT | Mod: 95 | Performed by: FAMILY MEDICINE

## 2022-11-29 NOTE — PROGRESS NOTES
Escobar is a 57 year old who is being evaluated via a billable telephone visit.      What phone number would you like to be contacted at? [phone]  How would you like to obtain your AVS? Kevin    Phone call duration: 16 minutes    ASSESMENT AND PLAN:  Diagnoses and all orders for this visit:  COVID-19  Discussed options with the patient.  We discussed the latest guidelines around isolation and duration of isolation.  Discussed treatment options, reviewed his most recent creatinine which was normal, medication review done with the patient he will discontinue atorvastatin for at least 10 days.  After review of risks and benefits he would like to proceed with Paxil bid as prescribed here:  -     nirmatrelvir and ritonavir (PAXLOVID) therapy pack; Take 3 tablets by mouth 2 times daily for 5 days (Take 2 Nirmatrelvir tablets and 1 Ritonavir tablet twice daily for 5 days)      Reviewed the risks and benefits of the treatment plan with the patient and/or caregiver and we discussed indications for routine and emergent follow-up.        SUBJECTIVE: 57-year-old male has a history of type 1 diabetes and developed cough and chills, symptoms started on 11/26/2022 and have progressively worsened.  This morning he tested for COVID and was positive.  It turns out that he was at an event to her multiple other people have tested positive for COVID.  Patient is not currently having any shortness of breath.  No chest pain.  He is fully immunized.    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 (H)     Hyperlipidemia LDL goal <100     Erectile dysfunction     Urinary urgency     Adhesive capsulitis of shoulder, left     Essential hypertension     Type 1 diabetes mellitus with  "hypoglycemia and without coma (H)     Lumbar back pain with radiculopathy affecting right lower extremity     Lumbar disc herniation with radiculopathy     Current Outpatient Medications   Medication Sig Dispense Refill     nirmatrelvir and ritonavir (PAXLOVID) therapy pack Take 3 tablets by mouth 2 times daily for 5 days (Take 2 Nirmatrelvir tablets and 1 Ritonavir tablet twice daily for 5 days) 30 each 0     ASPIRIN 81 MG OR TABS 1 tab po QD (Once per day) 100 3     blood glucose (NO BRAND SPECIFIED) test strip Check BS 4-5x/day.  Dispense 3 month supply. Accu-Check Compact Plus. 510 each 11     Cholecalciferol (VITAMIN D) 50 MCG (2000 UT) CAPS        glucagon (GLUCAGON EMERGENCY) 1 MG kit Inject 1 mg into the muscle once for 1 dose 1 mg 11     insulin glargine (LANTUS VIAL) 100 UNIT/ML vial INJECT 2 TO 5 UNITS SUBCUTANEOUSLY AT BEDTIME 10 mL 11     insulin lispro (HUMALOG) 100 UNIT/ML vial INJECT 2-3 UNITS IN THE MORNING, 2-3 AT LUNCH, AND 2-4 IN THE EVENING AND SLIDING SCALE. ESTIMATED MAXIMUM UNITS DAILY IS 25 UNITS PER DAY 10 mL 11     insulin syringe-needle U-100 (BD VEO INSULIN SYRINGE U/F) 31G X 15/64\" 0.3 ML Use 4 syringes daily or as directed. 120 each 11     insulin syringes, disposable, U-100 0.3 ML MISC 1 Device 4 times daily 100 each 11     lisinopril (PRINIVIL/ZESTRIL) 10 MG tablet Take 1 tablet (10 mg) by mouth daily 90 tablet 3     order for DME Equipment being ordered: Continue glucose monitor - Dexcom G4 - , transmitter, sensor 1 each prn     pregabalin (LYRICA) 50 MG capsule Take 1 capsule (50 mg) by mouth 3 times daily 90 capsule 3     TRUEPLUS LANCETS 33G MISC 1 Device 4 times daily 100 each 11     History   Smoking Status     Never   Smokeless Tobacco     Never       OBJECTICE: There were no vitals taken for this visit.     No results found for this or any previous visit (from the past 24 hour(s)).        Eduardo Mahan MD   "

## 2022-12-07 ENCOUNTER — RADIOLOGY INJECTION OFFICE VISIT (OUTPATIENT)
Dept: GENERAL RADIOLOGY | Facility: CLINIC | Age: 57
End: 2022-12-07
Attending: FAMILY MEDICINE
Payer: COMMERCIAL

## 2022-12-07 VITALS — SYSTOLIC BLOOD PRESSURE: 140 MMHG | DIASTOLIC BLOOD PRESSURE: 69 MMHG | OXYGEN SATURATION: 100 % | HEART RATE: 66 BPM

## 2022-12-07 DIAGNOSIS — M51.16 LUMBAR DISC HERNIATION WITH RADICULOPATHY: ICD-10-CM

## 2022-12-07 DIAGNOSIS — M54.16 LUMBAR RADICULOPATHY: ICD-10-CM

## 2022-12-07 DIAGNOSIS — M54.16 LUMBAR BACK PAIN WITH RADICULOPATHY AFFECTING RIGHT LOWER EXTREMITY: ICD-10-CM

## 2022-12-07 PROCEDURE — 64484 NJX AA&/STRD TFRM EPI L/S EA: CPT | Mod: RT | Performed by: PAIN MEDICINE

## 2022-12-07 PROCEDURE — 64483 NJX AA&/STRD TFRM EPI L/S 1: CPT | Mod: RT

## 2022-12-07 PROCEDURE — 64483 NJX AA&/STRD TFRM EPI L/S 1: CPT | Mod: RT | Performed by: PAIN MEDICINE

## 2022-12-07 PROCEDURE — 250N000011 HC RX IP 250 OP 636: Performed by: PAIN MEDICINE

## 2022-12-07 PROCEDURE — 255N000002 HC RX 255 OP 636: Performed by: PAIN MEDICINE

## 2022-12-07 RX ORDER — IOPAMIDOL 408 MG/ML
10 INJECTION, SOLUTION INTRATHECAL ONCE
Status: COMPLETED | OUTPATIENT
Start: 2022-12-07 | End: 2022-12-07

## 2022-12-07 RX ORDER — DEXAMETHASONE SODIUM PHOSPHATE 10 MG/ML
10 INJECTION, SOLUTION INTRAMUSCULAR; INTRAVENOUS ONCE
Status: COMPLETED | OUTPATIENT
Start: 2022-12-07 | End: 2022-12-07

## 2022-12-07 RX ADMIN — IOPAMIDOL 1 ML: 408 INJECTION, SOLUTION INTRATHECAL at 14:29

## 2022-12-07 RX ADMIN — DEXAMETHASONE SODIUM PHOSPHATE 10 MG: 10 INJECTION, SOLUTION INTRAMUSCULAR; INTRAVENOUS at 15:03

## 2022-12-07 ASSESSMENT — PAIN SCALES - GENERAL
PAINLEVEL: MODERATE PAIN (4)
PAINLEVEL: NO PAIN (0)

## 2022-12-07 NOTE — NURSING NOTE
Discharge Information    IV Discontiued Time:  NA    Amount of Fluid Infused:  NA    Discharge Criteria = When patient returns to baseline or as per MD order    Consciousness:  Pt is fully awake    Circulation:  BP +/- 20% of pre-procedure level    Respiration:  Patient is able to breathe deeply    O2 Sat:  Patient is able to maintain O2 Sat >92% on room air    Activity:  Moves 4 extremities on command    Ambulation:  Patient is able to stand and walk or stand and pivot into wheelchair    Dressing:  Clean/dry or No Dressing    Notes:   Discharge instructions and AVS given to patient    Patient meets criteria for discharge?  YES    Admitted to PCU?  No    Responsible adult present to accompany patient home?  Yes    Signature/Title:    Kiki Cazares RN  RN Care Coordinator  [facility] [place]

## 2022-12-07 NOTE — NURSING NOTE
Pre-procedure Intake  If YES to any questions or NO to having a   Please complete laminated checklist and leave on the computer keyboard for Provider, verbally inform provider if able.    For SCS Trial, RFA's or any sedation procedure:  Have you been fasting? NA    If yes, for how long?     Are you taking any any blood thinners such as Coumadin, Warfarin, Jantoven, Pradaxa Xarelto, Eliquis, Edoxaban, Enoxaparin, Lovenox, Heparin, Arixtra, Fondaparinux, or Fragmin? OR Antiplatelet medication such as Plavix, Brilinta, or Effient?   No     If yes, when did you take your last dose?     Do you take aspirin?  Yes -   ASA- Patient reports that it has been a few days since taking    If cervical procedure, have you held aspirin for 6 days?   NA    Do you have any allergies to contrast dye, iodine, steroid and/or numbing medications?  NO    Are you currently taking antibiotics or have an active infection?  NO    Have you had a fever/elevated temperature within the past week? NO    Are you currently taking oral steroids? NO    Do you have a ? Yes    Are you pregnant or breastfeeding?  NO    Have you received the COVID-19 vaccine? Yes    If yes, was it your 1st, 2nd or only dose needed? 2nd dose and booster    Date of most recent vaccine: 10/17/2022    Notify provider and RNs if systolic BP >170, diastolic BP >100, P >100 or O2 sats < 90%    Heike Miller MA  [facility]

## 2022-12-07 NOTE — PATIENT INSTRUCTIONS
Needs a note to return to work for unemployment.    Please email to [email or web address]. [email or web address]   Procedure Discharge Instructions    Today you saw:     Dr. Ronald Hollingsworth,      You had an:  Epidural steroid injection       Medications used:  Lidocaine   Bupivacaine   Dexamethasone Omnipaque   Normal saline        If you have received sedation before, during, or after your procedure, for the next 24 hours you shall NOT:   -Drive  -Operate machinery  -Drink alcohol  -Sign any legal documents      If you were holding your blood thinning medication, please restart taking it: N/A  Be cautious when walking. Numbness and/or weakness in the lower extremities may occur for up to 6-8 hours after the procedure due to effect of the local anesthetic  Do not drive for 6 hours. The effect of the local anesthetic could slow your reflexes.   You may resume your regular activities after 24 hours  Avoid strenuous activity for the first 24 hours  You may shower, however avoid swimming, tub baths or hot tubs for 24 hours following your procedure  You may have a mild to moderate increase in pain for several days following the injection.  It may take up to 14 days for the steroid medication to start working although you may feel the effect as early as a few days after the procedure.     You may use ice packs for 10-15 minutes, 3 to 4 times a day at the injection site for comfort  Do not use heat to painful areas for 6 to 8 hours. This will give the local anesthetic time to wear off and prevent you from accidentally burning your skin.   Unless you have been directed to avoid the use of anti-inflammatory medications (NSAIDS), you may use medications such as ibuprofen, Aleve or Tylenol for pain control if needed.   If you were fasting, you may resume your normal diet and medications after the procedure  If you have diabetes, check your blood sugar more frequently than usual as your blood sugar may be higher than normal for 10-14 days following a steroid injection. Contact your doctor who  manages your diabetes if your blood sugar is higher than usual  Possible side effects of steroids that you may experience include flushing, elevated blood pressure, increased appetite, mild headaches and restlessness.  All of these symptoms will get better with time.  If you experience any of the following, call the Pain Clinic during work hours (Mon-Friday 8-4:30 pm) at [phone] or the Provider Line after hours at [phone]:  -Fever over 100 degree F  -Swelling, bleeding, redness, drainage, warmth at the injection site  -Progressive weakness or numbness in your legs or arms  -Loss of bowel or bladder function  -Unusual headache that is not relieved by Tylenol or other pain reliever  -Unusual new onset of pain that is not improving

## 2022-12-07 NOTE — PROGRESS NOTES
Pre procedure Diagnosis: lumbar radiculopathy, lumbar degenerative disc disease   Post procedure Diagnosis: Same  Procedure performed: lumbar transforaminal epidural steroid injection at right L4-5, L5-S1, fluoroscopically guided, contrast controlled  Anesthesia: none  Complications: none  Operators: Ronald Hollingsworth MD     Indications:   Rangel Singh is a 57 year old male.  They have a history of right lbp radiating to his le.  Exam shows + Slump and they have tried conservative treatment including meds/pt.    MRIreviewed  Options/alternatives, benefits and risks were discussed with the patient including bleeding, infection, tissue trauma, numbness, weakness, paralysis, spinal cord injury, radiation exposure, headache and reaction to medications. Questions were answered to his satisfaction and he agrees to proceed. Voluntary informed consent was obtained and signed.     Vitals were reviewed: Yes  BP (!) 150/91   Pulse 68   SpO2 100%   Allergies were reviewed:  Yes   Medications were reviewed:  Yes   Pre-procedure pain score: 4/10    Procedure:  After getting informed consent, patient was brought into the procedure suite and was placed in a prone position on the procedure table.   A Pause for the Cause was performed.  Patient was prepped and draped in sterile fashion.     After identifying the right L4-5, L5-S1 neuroforamen, the C-arm was rotated to a right lateral oblique angle.  A total of 4ml of Lidocaine 1% was used to anesthetize the skin and the needle track at a skin entry site coaxial with the fluoroscopy beam, and overriding the superior aspect of the neuroforamen.  A 22 gauge 3.5 inch spinal needle was advanced under intermittent fluoroscopy until it entered the foramen superiorly.    The position was then inspected from anteroposterior and lateral views, and the needle adjusted appropriately.  A total of 1ml of Omnipaque-300 was injected, confirming appropriate position, with spread into the  nerve root sheath and the epidural space, with no intravascular uptake. 9ml was wasted    Then, after repeated negative aspiration, a combination of Decadron 10 mg, 0.25% bupivacaine 2 ml, diluted with 3ml of normal saline was injected.     Hemostasis was achieved, the area was cleaned, and bandaids were placed when appropriate.  The patient tolerated the procedure well, and was taken to the recovery room.    Images were saved to PACS.    Post-procedure pain score: 0/10  Follow-up includes:   -f/u phone call in one week  -f/u with referring provider    Ronald Hollingsworth MD  [facility]

## 2022-12-08 ENCOUNTER — TELEPHONE (OUTPATIENT)
Dept: FAMILY MEDICINE | Facility: CLINIC | Age: 57
End: 2022-12-08

## 2022-12-08 DIAGNOSIS — R91.1 LUNG NODULE: Primary | ICD-10-CM

## 2022-12-08 NOTE — TELEPHONE ENCOUNTER
Please call patient to get a chest xray.    There was a questionable lung nodule noted on his xray of the lumbar spine.    I have ordered a future chest xray to be done in clinic here in [place] to see if there is a nodule or not.    Please have the patient get the cxr and I will get the result to see if we need to proceed with a chest CT scan for a closer look.    Patel Boogie MD  Family Medicine

## 2022-12-09 ENCOUNTER — ANCILLARY PROCEDURE (OUTPATIENT)
Dept: GENERAL RADIOLOGY | Facility: CLINIC | Age: 57
End: 2022-12-09
Attending: FAMILY MEDICINE
Payer: COMMERCIAL

## 2022-12-09 DIAGNOSIS — R91.1 LUNG NODULE: ICD-10-CM

## 2022-12-09 PROCEDURE — 71046 X-RAY EXAM CHEST 2 VIEWS: CPT | Mod: TC | Performed by: RADIOLOGY

## 2022-12-12 NOTE — TELEPHONE ENCOUNTER
Please call patient,  The CXR did confirm a likely benign granuloma.  The radiologist suggested CT scan of the lung nodule to make sure.  I ordered the future CT can and please let Ray know how to schedule his test by calling [phone].  Sincerely,  Patel Boogie MD

## 2022-12-20 ENCOUNTER — HOSPITAL ENCOUNTER (OUTPATIENT)
Dept: CT IMAGING | Facility: CLINIC | Age: 57
Discharge: HOME | End: 2022-12-20
Attending: FAMILY MEDICINE | Admitting: FAMILY MEDICINE
Payer: COMMERCIAL

## 2022-12-20 DIAGNOSIS — R91.1 LUNG NODULE: ICD-10-CM

## 2022-12-20 PROCEDURE — 71250 CT THORAX DX C-: CPT

## 2022-12-22 ENCOUNTER — HOSPITAL ENCOUNTER (OUTPATIENT)
Dept: PHYSICAL THERAPY | Facility: CLINIC | Age: 57
Setting detail: THERAPIES SERIES
Discharge: HOME | End: 2022-12-22
Attending: ORTHOPAEDIC SURGERY
Payer: COMMERCIAL

## 2022-12-22 PROCEDURE — 97161 PT EVAL LOW COMPLEX 20 MIN: CPT | Mod: GP | Performed by: PHYSICAL THERAPIST

## 2022-12-22 PROCEDURE — 97110 THERAPEUTIC EXERCISES: CPT | Mod: GP | Performed by: PHYSICAL THERAPIST

## 2022-12-22 PROCEDURE — 97140 MANUAL THERAPY 1/> REGIONS: CPT | Mod: GP | Performed by: PHYSICAL THERAPIST

## 2022-12-22 NOTE — PROGRESS NOTES
12/22/22 0700   General Information   Type of Visit Initial OP Ortho PT Evaluation   Start of Care Date 12/22/22   Referring Physician Stephan Fregoso   Patient/Family Goals Statement improve pain, be able to run marathons again (hasn't run since September)   Orders Evaluate and Treat   Date of Order 11/28/22   Certification Required? No   Medical Diagnosis Lumbar disc herniation with radiculopathy (M51.16)  - Primary     Lumbar back pain with radiculopathy affecting right lower extremity (M54.16)   Surgical/Medical history reviewed Yes   Precautions/Limitations no known precautions/limitations   General Information Comments PMH: DM   Body Part(s)   Body Part(s) Lumbar Spine/SI   Presentation and Etiology   Pertinent history of current problem (include personal factors and/or comorbidities that impact the POC) Patient had back pain that started back in August. Had done PT at that time with no improvement in pain. Had a f/u with the doctor and then did a lumbar injection at the beginning of December.  No difference in pain and no change in where the pain was at. The intensity varies depending on what movement he makes. Standing for 1/2 hour to an hour it hurts. Has had history of back pain on and off over the past several years but pain used to always go away.   Impairments A. Pain;B. Decreased WB tolerance;F. Decreased strength and endurance;H. Impaired gait;K. Numbness;L. Tingling   Functional Limitations perform activities of daily living   Symptom Location back/legs/hip on right   How/Where did it occur From insidious onset;With repetition/overuse   Onset date of current episode/exacerbation 09/01/22   Chronicity New   Pain rating (0-10 point scale) Worst (/10);Best (/10)   Best (/10) 2   Worst (/10) 10   Pain quality A. Sharp;B. Dull;C. Aching   Frequency of pain/symptoms A. Constant   Pain/symptoms are: The same all the time   Pain/symptoms exacerbated by A. Sitting;B. Walking;C. Lifting;D. Carrying;E.  Rest;G. Certain positions;I. Bending;J. ADL;K. Home tasks   Pain/symptoms eased by F. Certain positions;G. Heat;H. Cold   Progression of symptoms since onset: Improved   Current Level of Function   Patient role/employment history F. Retired   Fall Risk Screen   Fall screen completed by PT   Have you fallen 2 or more times in the past year? No   Have you fallen and had an injury in the past year? No   Is patient a fall risk? No   Abuse Screen (yes response referral indicated)   Feels Unsafe at Home or Work/School no   Feels Threatened by Someone no   Does Anyone Try to Keep You From Having Contact with Others or Doing Things Outside Your Home? no   Physical Signs of Abuse Present no   Lumbar Spine/SI Objective Findings   Observation patient has pain with shifting positions, needs to change positions frequently   Posture sitting with increaesd lumbar lordosis   Gait/Locomotion moving slowly with decreased hip extension B   Flexion ROM pain within 20 degrees flexion, sharp pain in the right SI/Glut and goes down the leg into whole leg/foot   Extension ROM 25% limited: increase in pain   Right Side Bending ROM finger tips 2 inches form knee joint   Left Side Bending ROM finger tips to knee joint line pulling in right side of back.   Pelvic Screen SI testing:  compression -, distraction - for pain, approximation + for pain on right, Thigh thrust - for pain on rigth   Hip Screen hip flexion to 90 on the right   Hip Flexion (L2) Strength 4+/5 R   Knee Flexion Strength 4/5 R   Knee Extension (L3) Strength 5/5 R   Ankle Dorsiflexion (L4) Strength 4+/5 on the right, 5/5 L   Great Toe Extension (L5) Strength 5/5 R   Slump Test + R   Segmental Mobility L1-5 mild hypomobility but no pain   Palpation mild tenderness right piriformis   Balance/Proprioception (Single Leg Stance) left: 10 seconds, right: 10 seconds sharp pain in buttock and when taking weight off that leg the whole foot goes numb   Ankle Plantar Flexion (S1)  Strength 4+/5 on the right, 5/5 L   Sensation Testing decreased sensation over the lateral right foot   Hip Flexor Flexibility mild tightness on R   Planned Therapy Interventions   Planned Therapy Interventions balance training;bed mobility training;gait training;joint mobilization;manual therapy;neuromuscular re-education;ROM;strengthening;stretching;transfer training   Planned Modality Interventions   Planned Modality Interventions Traction   Clinical Impression   Criteria for Skilled Therapeutic Interventions Met yes, treatment indicated   PT Diagnosis decreased lumbar ROM   Influenced by the following impairments decreased lumbar ROM, decreased strength, pain   Functional limitations due to impairments walking, sitting, standing, bending, sleeping   Clinical Presentation Stable/Uncomplicated   Clinical Presentation Rationale clinical decision making and chart review   Clinical Decision Making (Complexity) Low complexity   Therapy Frequency 1 time/week   Predicted Duration of Therapy Intervention (days/wks) 6 weeks   Risk & Benefits of therapy have been explained Yes   Patient, Family & other staff in agreement with plan of care Yes   Clinical Impression Comments Rangel Singh is a 57 year old male who presents to PT with complaints of continued low back pain.  HE had done PT In October of this year but was not improving with manual therpay and prone extension exercises. He had a lumbar injection but pain did not improve and has since worsened further. Will trial another round of PT to try variations of extension based therapy to see if pain will centralize.  PT prognosis is fair due to last round of PT failing, however pt well motivated and in good health.   Education Assessment   Preferred Learning Style Listening;Demonstration;Pictures/video   Barriers to Learning No barriers   ORTHO GOALS   PT Ortho Eval Goals 1;2;3   Ortho Goal 1   Goal Identifier 1   Goal Description Patient report pain not radiating  past the knee on the right leg inorder to show centralization of pain.   Target Date 01/12/23   Ortho Goal 2   Goal Identifier 2   Goal Description Patient will be able to bend down to tie shoes with less than a 4/10 pain.   Target Date 02/02/23   Ortho Goal 3   Goal Identifier 3   Goal Description Patient will improve TREVON score to less than 45 in order to show improved pain levels and function related to his back.   Target Date 02/02/23   Total Evaluation Time   PT Eval, Low Complexity Minutes (53135) 14       Marisa Pacheco  PT, DPT       12/22/2022   [address] [address]   [email or web address]  Voicemail: [phone]

## 2022-12-29 ENCOUNTER — MYC MEDICAL ADVICE (OUTPATIENT)
Dept: ORTHOPEDICS | Facility: CLINIC | Age: 57
End: 2022-12-29

## 2022-12-30 NOTE — PROGRESS NOTES
OUTPATIENT PHYSICAL THERAPY DISCHARGE SUMMARY   Patel Boogie MD 11/23/22 0700   Signing Clinician's Name / Credentials   Signing clinician's name / credentials Hiral Calvo, PT   Session Number   Session Number 5   Adult Goals   PT Ortho Eval Goals 1;2;3;4   Ortho Goal 1   Goal Identifier 1.   Goal Description Pt will be able to consistently sit X 30 min w/infrequent shifting   Target Date 12/17/22   Ortho Goal 2   Goal Identifier 2.   Goal Description Pt will be able to bend forward to put on R shoe/ sock w/ pain no > 3/10   Target Date 12/17/22   Ortho Goal 3   Goal Identifier 3.   Goal Description Pt will consistently sleep 6 to 6.5 hours before waking due to R LE symptoms   Target Date 12/17/22   Ortho Goal 4   Goal Identifier 4.   Goal Description Pt will be independent and consistent w/HEP and have increased awareness of body mechanics   Target Date 12/17/22   Subjective Report   Subjective Report Pt states he is not any better.   Pt states w/ getting after sitting X 30 min will note some numbness in R foot.   Pt will be seeing neurosurgeon on 11/28/22.  Pt states ex have become painful--previously was not.   Sleeping 3-4 hours then unable to get back to sleep w/ R LE pain.  Pt notes rib pain has decreased.   Objective Measures    LROM flex 50% to knees, ext 10 %, SB R 70% ,  L 80%.    sits leaning to L   Manual Therapy   Patient Response No change in FB.  Initially pt reported the ball traction felt good but w/ final pull he noted some numbness in R foot.   Increased tone noted in lumbar paraspinals   Treatment Detail MET correction  ERS R L 4 and 5 (no complaints)   Prone PA's L3-5 Grade II. .  in supine 90/90 feet on ball manual lumbar intermittent traction.  STM to R lumbar paraspinals in L SL.   Plan   Home program ex as above, ice, walking   Plan Discharge from PT as pt was not making progress and was going to follow up w/ MD   Comments   Comments Pt cont to work towards goals.

## 2023-01-02 ENCOUNTER — HOSPITAL ENCOUNTER (OUTPATIENT)
Dept: PHYSICAL THERAPY | Facility: CLINIC | Age: 58
Setting detail: THERAPIES SERIES
Discharge: HOME | End: 2023-01-02
Attending: ORTHOPAEDIC SURGERY
Payer: COMMERCIAL

## 2023-01-02 PROCEDURE — 97140 MANUAL THERAPY 1/> REGIONS: CPT | Mod: GP | Performed by: PHYSICAL THERAPIST

## 2023-01-02 PROCEDURE — 97110 THERAPEUTIC EXERCISES: CPT | Mod: GP | Performed by: PHYSICAL THERAPIST

## 2023-01-05 ENCOUNTER — TRANSFERRED RECORDS (OUTPATIENT)
Dept: MULTI SPECIALTY CLINIC | Facility: CLINIC | Age: 58
End: 2023-01-05
Payer: COMMERCIAL

## 2023-01-05 LAB
ALBUMIN (URINE) MG/SPEC: 8.5 MG/L
ALBUMIN/CREATININE RATIO: 7 MG/G
CHOLESTEROL (EXTERNAL): 185 MG/DL
CREATININE (EXTERNAL): 0.83 MG/DL (ref 0.72–1.25)
CREATININE (URINE): 130.7 MG/DL
HBA1C MFR BLD: 6 %
HDLC SERPL-MCNC: 59 MG/DL
LDL CHOLESTEROL CALCULATED (EXTERNAL): 118 MG/DL (ref 0–159)
TRIGLYCERIDES (EXTERNAL): 39 MG/DL (ref 30–150)
TSH SERPL-ACNC: 1.94 UIU/ML (ref 0.47–5)

## 2023-01-09 ENCOUNTER — OFFICE VISIT (OUTPATIENT)
Dept: ORTHOPEDICS | Facility: CLINIC | Age: 58
End: 2023-01-09
Payer: COMMERCIAL

## 2023-01-09 ENCOUNTER — TELEPHONE (OUTPATIENT)
Dept: ORTHOPEDICS | Facility: CLINIC | Age: 58
End: 2023-01-09

## 2023-01-09 DIAGNOSIS — M43.10 SPONDYLOLISTHESIS, GRADE 1: ICD-10-CM

## 2023-01-09 DIAGNOSIS — M51.26 HERNIATED LUMBAR INTERVERTEBRAL DISC: ICD-10-CM

## 2023-01-09 DIAGNOSIS — M54.16 LUMBAR RADICULOPATHY: Primary | ICD-10-CM

## 2023-01-09 PROCEDURE — 99214 OFFICE O/P EST MOD 30 MIN: CPT | Performed by: ORTHOPAEDIC SURGERY

## 2023-01-09 RX ORDER — DEXAMETHASONE SODIUM PHOSPHATE 10 MG/ML
4 INJECTION, SOLUTION INTRAMUSCULAR; INTRAVENOUS
Status: ACTIVE | OUTPATIENT
Start: 2023-01-09

## 2023-01-09 RX ORDER — ACETAMINOPHEN 325 MG/1
975 TABLET ORAL ONCE
Status: CANCELLED | OUTPATIENT
Start: 2023-01-09 | End: 2023-01-09

## 2023-01-09 RX ORDER — CELECOXIB 200 MG/1
400 CAPSULE ORAL ONCE
Status: CANCELLED | OUTPATIENT
Start: 2023-01-09 | End: 2023-01-09

## 2023-01-09 RX ORDER — BUPIVACAINE HYDROCHLORIDE AND EPINEPHRINE 5; 5 MG/ML; UG/ML
30 INJECTION, SOLUTION PERINEURAL
Status: ACTIVE | OUTPATIENT
Start: 2023-01-09

## 2023-01-09 ASSESSMENT — PATIENT HEALTH QUESTIONNAIRE - PHQ9: SUM OF ALL RESPONSES TO PHQ QUESTIONS 1-9: 10

## 2023-01-09 NOTE — NURSING NOTE
Reason For Visit:   Chief Complaint   Patient presents with     RECHECK     6 week follow up low back and right side leg pain        Primary MD: Patel Boogie  Ref. MD: Est    ?  No  Occupation retired.     Date of injury: No  Type of injury: No.     Date of surgery: No  Type of surgery: No.     Smoker: No  Request smoking cessation information: No    There were no vitals taken for this visit.    Pain Assessment  Patient Currently in Pain: Yes  0-10 Pain Scale: 2  Primary Pain Location: Back    Oswestry (TREVON) Questionnaire    OSWESTRY DISABILITY INDEX 1/9/2023   Count 10   Sum 31   Oswestry Score (%) 62   Some recent data might be hidden        Visual Analog Pain Scale  Back Pain Scale 0-10: 2  Right leg pain: 4  Left leg pain: 0  Neck Pain Scale 0-10: 0  Right arm pain: 0  Left arm pain: 0    Promis 10 Assessment    PROMIS 10 11/27/2022   In general, would you say your health is: Good   In general, would you say your quality of life is: Very good   In general, how would you rate your physical health? Good   In general, how would you rate your mental health, including your mood and your ability to think? Very good   In general, how would you rate your satisfaction with your social activities and relationships? Very good   In general, please rate how well you carry out your usual social activities and roles Good   To what extent are you able to carry out your everyday physical activities such as walking, climbing stairs, carrying groceries, or moving a chair? Moderately   How often have you been bothered by emotional problems such as feeling anxious, depressed or irritable? Never   How would you rate your fatigue on average? Mild   How would you rate your pain on average?   0 = No Pain  to  10 = Worst Imaginable Pain 6   In general, would you say your health is: 3   In general, would you say your quality of life is: 4   In general, how would you rate your physical health? 3   In general, how would you  rate your mental health, including your mood and your ability to think? 4   In general, how would you rate your satisfaction with your social activities and relationships? 4   In general, please rate how well you carry out your usual social activities and roles. (This includes activities at home, at work and in your community, and responsibilities as a parent, child, spouse, employee, friend, etc.) 3   To what extent are you able to carry out your everyday physical activities such as walking, climbing stairs, carrying groceries, or moving a chair? 3   In the past 7 days, how often have you been bothered by emotional problems such as feeling anxious, depressed, or irritable? 1   In the past 7 days, how would you rate your fatigue on average? 2   In the past 7 days, how would you rate your pain on average, where 0 means no pain, and 10 means worst imaginable pain? 6   Global Mental Health Score 17   Global Physical Health Score 13   PROMIS TOTAL - SUBSCORES 30   Some recent data might be hidden                Tiki Espinoza LPN

## 2023-01-09 NOTE — TELEPHONE ENCOUNTER
RN called and spoke with patient. Patient was seen in clinic in [place] with Dr. Fregoso. Patient signed up for laminectomy surgery.  RN provided pre op and post op teaching for patient. Spent 10 minutes on the phone. Patient will await phone call from Curtis with surgery date and other pertinent surgical information.  Verified patient is not depressed and the PhQ score comes from his low back pain.     Jatinder Mabry RN

## 2023-01-09 NOTE — LETTER
1/9/2023         RE: Rangel Singh  [id number]  [facility] [id number]        Dear Colleague,    Thank you for referring your patient, Rangel Singh, to the [facility]. Please see a copy of my visit note below.    Depression Response    Patient completed the PHQ-9 assessment for depression and scored >9? Yes  Question 9 on the PHQ-9 was positive for suicidality? No  Does patient have current mental health provider? No    Is this a virtual visit? 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  I left some in clinic 6 weeks ago.  Since that time he has had an epidural steroid injection which provided excellent pain relief for 1.5 hours however after that he had full return of symptoms and apparently had no response from the steroid component of the injection.  He has been doing physical therapy and home exercise program diligently according to both the patient and his wife but feels that the exercises are making symptoms worse.  Notes his leg continues to give way.  Has pain rating to right buttock as well as numbness of right foot consistent with L5 distribution.  he has been on pregabalin but does not notice any improvement in symptoms.  He feels that since we last saw each other his symptoms have been stable if not actually worsened.  He has severe symptoms with sitting, difficulty with prolonged standing, has been very little limited in his ability perform actives of daily living and has been unable to exercise which is one of his primary past time this is a marathon or an avid cyclist.  He does not have any cauda equina symptoms.    Objective:  Patient is alert and oriented no acute distress.  He appears very physically fit for his age  Unlabored respiration with no  audible wheeze  He walks with antalgic gait.  He has positive slump test  Resisted strength testing notable for 4/5 strength with right knee extension and great toe extension.  Otherwise 5/5 throughout  Subjective sensation going light touch intact L3 S1.  Reflexes 2+ throughout  Very positive straight leg raise on the right    Imaging review: Again reviewed imaging with patient and his wife which demonstrates grade 1 anterolisthesis of L3 on 4, grade 1 retrolisthesis of L4 on 5 with slight dynamic instability less than 4 mm.  MRI shows mild to moderate foraminal stenosis at L3-4 and L4-5 on the right with severe lateral recess stenosis due to the L4-5 disc herniation on the right side causing obvious impingement upon the traversing L5 nerve root.    Impression and plan:  57-year-old male now 4 months of severe right L5 radicular symptoms in the setting of large L4-5 right-sided disc herniation.  He has had appropriate conservative measures including acute modification, gabapentinoids, physical therapy, epidural steroid injection without relief.  Unable to perform activities of daily living or exercise without limitation due to his symptoms.  Discussed with alternative benefits of proceeding with a lumbar microdiscectomy.  Counseled patient on 10 to 14% risk of reherniation in the literature.  Counseled him that the risk for this is greatest in the 6 weeks after surgery and discussed avoiding repetitive bending lifting twisting in that timeframe.  Counseled the patient regarding the risk of a dural tear which approximate 1% for microdiscectomy as well as the outcomes after dural tear.  Very low risk of infection or perioperative complication given his overall health.  After discussion he like to proceed with microdiscectomy.  Case order was placed.  We will have him see primary care physician for preoperative H&P and will contact him to schedule surgery in the next several weeks.    35 minutes was spent on this  visit today including chart review history and physical examination patient counseling and care coordination.    Stephan Fregoso MD  Orthopedic Spine Surgeon  , Department of Orthopedic Surgery

## 2023-01-09 NOTE — TELEPHONE ENCOUNTER
Patient Returning Call    Reason for call:  Patient returning call from Godwin SMALLWOOD was left ortho co number to callback, no callback number was left on chart. No priority line for Alfredo.     Information relayed to patient:  TE sent to clinic    Patient has additional questions:  Yes    What are your questions/concerns:  Requesting callback     Could we [facility] this [facility] or would you prefer to receive a phone call?:   Patient would prefer a phone call   Okay to leave a detailed message?: Yes at Cell number on file:    Telephone Information:   Mobile

## 2023-01-09 NOTE — PROGRESS NOTES
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  I left some in clinic 6 weeks ago.  Since that time he has had an epidural steroid injection which provided excellent pain relief for 1.5 hours however after that he had full return of symptoms and apparently had no response from the steroid component of the injection.  He has been doing physical therapy and home exercise program diligently according to both the patient and his wife but feels that the exercises are making symptoms worse.  Notes his leg continues to give way.  Has pain rating to right buttock as well as numbness of right foot consistent with L5 distribution.  he has been on pregabalin but does not notice any improvement in symptoms.  He feels that since we last saw each other his symptoms have been stable if not actually worsened.  He has severe symptoms with sitting, difficulty with prolonged standing, has been very little limited in his ability perform actives of daily living and has been unable to exercise which is one of his primary past time this is a marathon or an avid cyclist.  He does not have any cauda equina symptoms.    Objective:  Patient is alert and oriented no acute distress.  He appears very physically fit for his age  Unlabored respiration with no audible wheeze  He walks with antalgic gait.  He has positive slump test  Resisted strength testing notable for 4/5 strength with right knee extension and great toe extension.  Otherwise 5/5 throughout  Subjective sensation going light touch intact L3 S1.  Reflexes 2+ throughout  Very positive straight leg raise on the right    Imaging review: Again reviewed imaging with patient and his wife which demonstrates grade 1 anterolisthesis of L3 on 4, grade 1 retrolisthesis of L4 on 5 with slight dynamic instability less than 4 mm.  MRI shows mild to moderate  foraminal stenosis at L3-4 and L4-5 on the right with severe lateral recess stenosis due to the L4-5 disc herniation on the right side causing obvious impingement upon the traversing L5 nerve root.    Impression and plan:  57-year-old male now 4 months of severe right L5 radicular symptoms in the setting of large L4-5 right-sided disc herniation.  He has had appropriate conservative measures including acute modification, gabapentinoids, physical therapy, epidural steroid injection without relief.  Unable to perform activities of daily living or exercise without limitation due to his symptoms.  Discussed with alternative benefits of proceeding with a lumbar microdiscectomy.  Counseled patient on 10 to 14% risk of reherniation in the literature.  Counseled him that the risk for this is greatest in the 6 weeks after surgery and discussed avoiding repetitive bending lifting twisting in that timeframe.  Counseled the patient regarding the risk of a dural tear which approximate 1% for microdiscectomy as well as the outcomes after dural tear.  Very low risk of infection or perioperative complication given his overall health.  After discussion he like to proceed with microdiscectomy.  Case order was placed.  We will have him see primary care physician for preoperative H&P and will contact him to schedule surgery in the next several weeks.    35 minutes was spent on this visit today including chart review history and physical examination patient counseling and care coordination.    Stephan Fregoso MD  Orthopedic Spine Surgeon  , Department of Orthopedic Surgery

## 2023-01-09 NOTE — TELEPHONE ENCOUNTER
RN called and left patient detailed VM.  RN was hoping to do depression screening as well pre and post op teaching for patient. Patient was seen with Dr. Fregoso in [place]. Patient signed up for surgery.  RN provided call back number.    Jatinder Mabry RN

## 2023-01-16 ENCOUNTER — TELEPHONE (OUTPATIENT)
Dept: ORTHOPEDICS | Facility: CLINIC | Age: 58
End: 2023-01-16
Payer: COMMERCIAL

## 2023-01-16 NOTE — TELEPHONE ENCOUNTER
Patient Returning Call    Reason for call:  Patient calling wanting to speak to Dr Fregoso RN to schedule surgery requesting callback     Information relayed to patient:  TE sent to clinic     Patient has additional questions:  Yes    What are your questions/concerns:  Requesting callback     Could we send this information to you in [facility] or would you prefer to receive a phone call?:   Patient would prefer a phone call   Okay to leave a detailed message?: Yes at Cell number on file:    Telephone Information:   Mobile [phone]

## 2023-01-17 NOTE — TELEPHONE ENCOUNTER
Pt still waiting to hear back from surg schedulers. Please call both pt phone numbers he really needs to speak with someone. This was marked high priority yesterday and pt waited around all day to hear from someone. If someone can give him a call to let him know that someone is addressing this and give him a timeline

## 2023-01-23 ENCOUNTER — TELEPHONE (OUTPATIENT)
Dept: ORTHOPEDICS | Facility: CLINIC | Age: 58
End: 2023-01-23

## 2023-01-23 NOTE — TELEPHONE ENCOUNTER
Health Call Center    Phone Message    May a detailed message be left on voicemail: yes     Reason for Call: Other: Patient was supposed to hear back from Tania in '10 minutes' last Friday but never got a call back.     Patient is trying to schedule his surgery with Dr. Fregoso.    Please call patient to discuss.    Action Taken: Message routed to:  Other: [facility]    Travel Screening: Not Applicable

## 2023-01-26 NOTE — TELEPHONE ENCOUNTER
Patient is scheduled for surgery with Dr. Fregoso    Spoke with: Patient    Date of Surgery: 2/17/23    Location: [place]    Post op: 6 weeks    Pre op with Provider: Complete    H&P: Will schedule with PCP, Dr Boogie    Pre-procedure COVID-19 Test: N/A    Additional imaging/appointments: N/A    Surgery packet: Received in clinic     Additional comments: N/A

## 2023-02-01 ENCOUNTER — LAB (OUTPATIENT)
Dept: LAB | Facility: CLINIC | Age: 58
End: 2023-02-01
Payer: COMMERCIAL

## 2023-02-01 DIAGNOSIS — Z01.818 PREOP GENERAL PHYSICAL EXAM: ICD-10-CM

## 2023-02-01 DIAGNOSIS — E10.3293 TYPE 1 DIABETES MELLITUS WITH MILD NONPROLIFERATIVE RETINOPATHY OF BOTH EYES WITHOUT MACULAR EDEMA (H): Chronic | ICD-10-CM

## 2023-02-01 DIAGNOSIS — R97.20 ELEVATED PROSTATE SPECIFIC ANTIGEN (PSA): ICD-10-CM

## 2023-02-01 LAB — PSA SERPL-MCNC: 3.96 NG/ML (ref 0–3.5)

## 2023-02-01 PROCEDURE — 80048 BASIC METABOLIC PNL TOTAL CA: CPT

## 2023-02-01 PROCEDURE — 84153 ASSAY OF PSA TOTAL: CPT

## 2023-02-01 PROCEDURE — 36415 COLL VENOUS BLD VENIPUNCTURE: CPT

## 2023-02-02 ENCOUNTER — OFFICE VISIT (OUTPATIENT)
Dept: FAMILY MEDICINE | Facility: CLINIC | Age: 58
End: 2023-02-02
Payer: COMMERCIAL

## 2023-02-02 VITALS
RESPIRATION RATE: 16 BRPM | OXYGEN SATURATION: 98 % | BODY MASS INDEX: 27.49 KG/M2 | WEIGHT: 192 LBS | HEART RATE: 69 BPM | HEIGHT: 70 IN | DIASTOLIC BLOOD PRESSURE: 68 MMHG | TEMPERATURE: 96.6 F | SYSTOLIC BLOOD PRESSURE: 112 MMHG

## 2023-02-02 DIAGNOSIS — Z01.818 PREOP GENERAL PHYSICAL EXAM: Primary | ICD-10-CM

## 2023-02-02 DIAGNOSIS — M54.16 LUMBAR BACK PAIN WITH RADICULOPATHY AFFECTING RIGHT LOWER EXTREMITY: ICD-10-CM

## 2023-02-02 DIAGNOSIS — R97.20 ELEVATED PROSTATE SPECIFIC ANTIGEN (PSA): Primary | ICD-10-CM

## 2023-02-02 DIAGNOSIS — E10.3293 TYPE 1 DIABETES MELLITUS WITH MILD NONPROLIFERATIVE RETINOPATHY OF BOTH EYES WITHOUT MACULAR EDEMA (H): Chronic | ICD-10-CM

## 2023-02-02 LAB
ANION GAP SERPL CALCULATED.3IONS-SCNC: 8 MMOL/L (ref 7–15)
BUN SERPL-MCNC: 25.1 MG/DL (ref 8–23)
CALCIUM SERPL-MCNC: 9.5 MG/DL (ref 8.6–10)
CHLORIDE SERPL-SCNC: 104 MMOL/L (ref 98–107)
CREAT SERPL-MCNC: 0.82 MG/DL (ref 0.67–1.17)
DEPRECATED HCO3 PLAS-SCNC: 30 MMOL/L (ref 22–29)
GFR SERPL CREATININE-BSD FRML MDRD: >90 ML/MIN/1.73M2
GLUCOSE SERPL-MCNC: 165 MG/DL (ref 70–99)
POTASSIUM SERPL-SCNC: 4.5 MMOL/L (ref 3.4–5.3)
SODIUM SERPL-SCNC: 142 MMOL/L (ref 136–145)

## 2023-02-02 PROCEDURE — 99214 OFFICE O/P EST MOD 30 MIN: CPT | Performed by: NURSE PRACTITIONER

## 2023-02-02 RX ORDER — ROSUVASTATIN CALCIUM 40 MG/1
TABLET, COATED ORAL
COMMUNITY
Start: 2023-01-06

## 2023-02-02 ASSESSMENT — PAIN SCALES - GENERAL: PAINLEVEL: NO PAIN (0)

## 2023-02-02 NOTE — PATIENT INSTRUCTIONS
Hold ASA 81 mg for 7 days.  Take Lantus 80% of your dose the night before.  Hold short acting insulin until after surgery and restart with meals.  Okay to take Crestor and lisinopril 10 mg night before surgery.    For informational purposes only. Not to replace the advice of your health care provider. Copyright   ,  [facility]. All rights reserved. Clinically reviewed by Alecia De MD. [employer] [id number] - REV .  Preparing for Your Surgery  Getting started  A nurse will call you to review your health history and instructions. They will give you an arrival time based on your scheduled surgery time. Please be ready to share:  Your doctor's clinic name and phone number  Your medical, surgical, and anesthesia history  A list of allergies and sensitivities  A list of medicines, including herbal treatments and over-the-counter drugs  Whether the patient has a legal guardian (ask how to send us the papers in advance)  Please tell us if you're pregnant--or if there's any chance you might be pregnant. Some surgeries may injure a fetus (unborn baby), so they require a pregnancy test. Surgeries that are safe for a fetus don't always need a test, and you can choose whether to have one.   If you have a child who's having surgery, please ask for a copy of Preparing for Your Child's Surgery.    Preparing for surgery  Within 10 to 30 days of surgery: Have a pre-op exam (sometimes called an H&P, or History and Physical). This can be done at a clinic or pre-operative center.  If you're having a , you may not need this exam. Talk to your care team.  At your pre-op exam, talk to your care team about all medicines you take. If you need to stop any medicines before surgery, ask when to start taking them again.  We do this for your safety. Many medicines can make you bleed too much during surgery. Some change how well surgery (anesthesia) drugs work.  Call your insurance company to let them know you're  having surgery. (If you don't have insurance, call [phone].)  Call your clinic if there's any change in your health. This includes signs of a cold or flu (sore throat, runny nose, cough, rash, fever). It also includes a scrape or scratch near the surgery site.  If you have questions on the day of surgery, call your hospital or surgery center.  Eating and drinking guidelines  For your safety: Unless your surgeon tells you otherwise, follow the guidelines below.  Eat and drink as usual until 8 hours before you arrive for surgery. After that, no food or milk.  Drink clear liquids until 2 hours before you arrive. These are liquids you can see through, like water, Gatorade, and Propel Water. They also include plain black coffee and tea (no cream or milk), candy, and breath mints. You can spit out gum when you arrive.  If you drink alcohol: Stop drinking it the night before surgery.  If your care team tells you to take medicine on the morning of surgery, it's okay to take it with a sip of water.  Preventing infection  Shower or bathe the night before and morning of your surgery. Follow the instructions your clinic gave you. (If no instructions, use regular soap.)  Don't shave or clip hair near your surgery site. We'll remove the hair if needed.  Don't smoke or vape the morning of surgery. You may chew nicotine gum up to 2 hours before surgery. A nicotine patch is okay.  Note: Some surgeries require you to completely quit smoking and nicotine. Check with your surgeon.  Your care team will make every effort to keep you safe from infection. We will:  Clean our hands often with soap and water (or an alcohol-based hand rub).  Clean the skin at your surgery site with a special soap that kills germs.  Give you a special gown to keep you warm. (Cold raises the risk of infection.)  Wear special hair covers, masks, gowns and gloves during surgery.  Give antibiotic medicine, if prescribed. Not all surgeries need  antibiotics.  What to bring on the day of surgery  Photo ID and insurance card  Copy of your health care directive, if you have one  Glasses and hearing aids (bring cases)  You can't wear contacts during surgery  Inhaler and eye drops, if you use them (tell us about these when you arrive)  CPAP machine or breathing device, if you use them  A few personal items, if spending the night  If you have . . .  A pacemaker, ICD (cardiac defibrillator) or other implant: Bring the ID card.  An implanted stimulator: Bring the remote control.  A legal guardian: Bring a copy of the certified (court-stamped) guardianship papers.  Please remove any jewelry, including body piercings. Leave jewelry and other valuables at home.  If you're going home the day of surgery  You must have a responsible adult drive you home. They should stay with you overnight as well.  If you don't have someone to stay with you, and you aren't safe to go home alone, we may keep you overnight. Insurance often won't pay for this.  After surgery  If it's hard to control your pain or you need more pain medicine, please call your surgeon's office.  Questions?   If you have any questions for your care team, list them here: _________________________________________________________________________________________________________________________________________________________________________ ____________________________________ ____________________________________ ____________________________________

## 2023-02-02 NOTE — PROGRESS NOTES
[facility]  [address] [address]  Phone: [phone]  Primary Provider: Patel Boogie  Pre-op Performing Provider: DAMIEN HADDAD    PREOPERATIVE EVALUATION:  Today's date: 2/2/2023    Rangel Singh is a 57 year old male who presents for a preoperative evaluation.    Surgical Information:  Surgery/Procedure: Right  Lumbar 4-5 microdiscectomy  Surgery Location:  OR  Surgeon: mike  Surgery Date: 02/17/2023  Time of Surgery: 200  Where patient plans to recover: At home with family  Fax number for surgical facility: Note does not need to be faxed, will be available electronically in Epic.    Type of Anesthesia Anticipated: General    Assessment & Plan     The proposed surgical procedure is considered INTERMEDIATE risk.    Preop general physical exam  Labs ordered.    Lumbar back pain with radiculopathy affecting right lower extremity  Stable.    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  Discussed how to manage medications.  Taking Lisinopril for diabetes not blood pressure.    Risks and Recommendations:  The patient has the following additional risks and recommendations for perioperative complications:   - No identified additional risk factors other than previously addressed    Medication Instructions:   - ACE/ARB: May be continued on the day of surgery.    - Long acting insulin (e.g. glargine, detemir): Take 80% of the usual evening or morning dose before surgery.   - Short acting insulin (e.g. regular, lispro, aspart)  HOLD on the morning of surgery.    RECOMMENDATION:  APPROVAL GIVEN to proceed with proposed procedure, without further diagnostic evaluation.    Subjective     HPI related to upcoming procedure: Patient was nathanael in early August with no symptoms.  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  Otherwise no other causes for lower  back symptoms.  MRI impression:    1. Degenerative change of the lumbar spine as detailed above.  2. No high-grade spinal canal stenosis.  3. Severe narrowing of the right lateral recess of the spinal canal at  L4-L5 due to a large disc extrusion likely resulting in irritation or  impingement upon the descending right L5 and/or S1 nerve roots.  4. Moderate neural foraminal stenosis bilaterally at L4-L5. No other  significant neural foraminal narrowing of the lumbar spine.  5. 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    Underwent Physical Therapy and injections and nothing has improved symptoms.  Now opting for surgical correction.    Preop Questions 2/2/2023   1. Have you ever had a heart attack or stroke? No   2. Have you ever had surgery on your heart or blood vessels, such as a stent placement, a coronary artery bypass, or surgery on an artery in your head, neck, heart, or legs? No   3. Do you have chest pain with activity? No   4. Do you have a history of  heart failure? No   5. Do you currently have a cold, bronchitis or symptoms of other infection? No   6. Do you have a cough, shortness of breath, or wheezing? No   7. Do you or anyone in your family have previous history of blood clots? YES - mom 2010 stroke   8. Do you or does anyone in your family have a serious bleeding problem such as prolonged bleeding following surgeries or cuts? No   9. Have you ever had problems with anemia or been told to take iron pills? No   10. Have you had any abnormal blood loss such as black, tarry or bloody stools? No   11. Have you ever had a blood transfusion? No   12. Are you willing to have a blood transfusion if it is medically needed before, during, or after your surgery? Yes   13. Have you or any of your relatives ever had problems with anesthesia? No   14. Do you have sleep apnea, excessive snoring or daytime drowsiness? No   15. Do you have any artifical heart valves or other implanted medical devices  like a pacemaker, defibrillator, or continuous glucose monitor? YES - Dexcom G6 CBG Monitor   15a. What type of device do you have? cgm   15b. Name of the clinic that manages your device:  [facility] [address]   16. Do you have artificial joints? No   17. Are you allergic to latex? No       Health Care Directive:  Patient does not have a Health Care Directive or Living Will: Discussed advance care planning with patient; however, patient declined at this time.    Preoperative Review of :   reviewed - no record of controlled substances prescribed.    Status of Chronic Conditions:  DIABETES - Patient has a longstanding history of DiabetesType Type I . Patient is being treated with diet, oral agents, exercise, intensive insulin injection program and ASA and denies significant side effects. Control has been good. Complicating factors include but are not limited to: hyperlipidemia and retinopathy.     HYPERLIPIDEMIA - Patient has a long history of significant Hyperlipidemia requiring medication for treatment with recent good control. Patient reports no problems or side effects with the medication.       Review of Systems  CONSTITUTIONAL: NEGATIVE for fever, chills, change in weight  INTEGUMENTARY/SKIN: NEGATIVE for worrisome rashes, moles or lesions  EYES: NEGATIVE for vision changes or irritation  ENT/MOUTH: NEGATIVE for ear, mouth and throat problems  RESP: NEGATIVE for significant cough or SOB  CV: NEGATIVE for chest pain, palpitations or peripheral edema  GI: NEGATIVE for nausea, abdominal pain, heartburn, or change in bowel habits  : NEGATIVE for frequency, dysuria, or hematuria  MUSCULOSKELETAL:POSITIVE  for back pain lower back with radiculopathy in right leg  NEURO: POSITIVE for some numbness/tingling in the right lowe4r extremity  ENDOCRINE: NEGATIVE for temperature intolerance, skin/hair changes  HEME: NEGATIVE for bleeding problems  PSYCHIATRIC: NEGATIVE for changes in mood or  affect    Patient Active Problem List    Diagnosis Date Noted     Herniated lumbar intervertebral disc 01/09/2023     Priority: Medium     Spondylolisthesis, grade 1 01/09/2023     Priority: Medium     Lumbar back pain with radiculopathy affecting right lower extremity 11/28/2022     Priority: Medium     Lumbar radiculopathy 11/28/2022     Priority: Medium     Type 1 diabetes mellitus with hypoglycemia and without coma (H) 11/21/2017     Priority: Medium     Hypoglycemia unawareness.  Uses continuous glucose monitor (Dexcom) for this purpose       Adhesive capsulitis of shoulder, left 04/04/2016     Priority: Medium     Follows with [facility] Dr. 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 Insulin started 1999. Cpeptide 0.5 2004. Glutamic acid decarboylase antibody 1.1 12/05 (normal <1.45). No peripheral neuropathy. 11/3/09 Mild to moderate non-proliferative retinopathy.  2016, has mild background retinopathy  Has DEXCOM       Hyperlipidemia LDL goal <100 10/31/2010     Priority: Medium     LVH (left ventricular hypertrophy) 01/08/2009     Priority: Medium     Echo 1/09- borderline right ventricular hypertrophy,  borderline concentric left ventricular hypertrophy        Past Medical History:   Diagnosis Date     CELLULITIS NOS     left danny-auricular 2004     Facial cellulitis 8/11/2014     Past Surgical History:   Procedure Laterality Date     COLONOSCOPY  1/3/2014    Procedure: COMBINED COLONOSCOPY, SINGLE BIOPSY/POLYPECTOMY BY BIOPSY;  Colonoscopy;  Surgeon: Ed Galvez MD;  Location: [facility]     EXAM UNDER ANESTHESIA, MANIPULATE JOINT (LOCATION) Left 3/30/2016    Procedure: EXAM UNDER ANESTHESIA, MANIPULATE JOINT (LOCATION);  Surgeon: Justen Ford MD;   "Location: [facility]     SURGICAL HISTORY OF -   08/97    appendectomy ([place], [place])      Current Outpatient Medications   Medication Sig Dispense Refill     ASPIRIN 81 MG OR TABS  100 3     blood glucose (NO BRAND SPECIFIED) test strip Check BS 4-5x/day.  Dispense 3 month supply. Accu-Check Compact Plus. 510 each 11     Cholecalciferol (VITAMIN D) 50 MCG (2000 UT) CAPS        insulin glargine (LANTUS VIAL) 100 UNIT/ML vial INJECT 2 TO 5 UNITS SUBCUTANEOUSLY AT BEDTIME 10 mL 11     insulin lispro (HUMALOG) 100 UNIT/ML vial INJECT 2-3 UNITS IN THE MORNING, 2-3 AT LUNCH, AND 2-4 IN THE EVENING AND SLIDING SCALE. ESTIMATED MAXIMUM UNITS DAILY IS 25 UNITS PER DAY 10 mL 11     insulin syringe-needle U-100 (BD VEO INSULIN SYRINGE U/F) 31G X 15/64\" 0.3 ML Use 4 syringes daily or as directed. 120 each 11     insulin syringes, disposable, U-100 0.3 ML MISC 1 Device 4 times daily 100 each 11     lisinopril (PRINIVIL/ZESTRIL) 10 MG tablet Take 1 tablet (10 mg) by mouth daily 90 tablet 3     order for DME Equipment being ordered: Continue glucose monitor - Dexcom G4 - , transmitter, sensor 1 each prn     TRUEPLUS LANCETS 33G MISC 1 Device 4 times daily 100 each 11     glucagon (GLUCAGON EMERGENCY) 1 MG kit Inject 1 mg into the muscle once for 1 dose 1 mg 11     rosuvastatin (CRESTOR) 40 MG tablet          Allergies   Allergen Reactions     Nkda [No Known Drug Allergies]         Social History     Tobacco Use     Smoking status: Never     Smokeless tobacco: Never   Substance Use Topics     Alcohol use: Yes     Comment: occ. 2-3 times a month     Family History   Problem Relation Age of Onset     C.A.D. Father         50s      Hypertension Father      Atrial fibrillation Father      Hypertension Mother      Cerebrovascular Disease Mother      Atrial fibrillation Mother      Cancer Maternal Grandfather         ?throat/lung cancer     Cerebrovascular Disease Maternal Grandmother      Arthritis Sister         RA     C.A.D. " "Brother      Atrial fibrillation Brother      Circulatory Other         2 cousins on maternal side .w anurysms     Cancer - colorectal No family hx of      Diabetes No family hx of      History   Drug Use No         Objective     /68   Pulse 69   Temp (!) 96.6  F (35.9  C) (Tympanic)   Resp 16   Ht 1.778 m (5' 10\")   Wt 87.1 kg (192 lb)   SpO2 98%   BMI 27.55 kg/m      Physical Exam    GENERAL APPEARANCE: healthy, alert and no distress     EYES: EOMI,  PERRL     HENT: ear canals and TM's normal and nose and mouth without ulcers or lesions     NECK: no adenopathy, no asymmetry, masses, or scars and thyroid normal to palpation     RESP: lungs clear to auscultation - no rales, rhonchi or wheezes     CV: regular rates and rhythm, normal S1 S2, no S3 or S4 and no murmur, click or rub     ABDOMEN:  soft, nontender, no HSM or masses and bowel sounds normal     MS: weakness more on the right lower extremity but otherwise normal exam     SKIN: no suspicious lesions or rashes     NEURO: Normal strength and tone, sensory exam grossly normal, mentation intact and speech normal     PSYCH: mentation appears normal. and affect normal/bright     LYMPHATICS: No cervical adenopathy    Recent Labs   Lab Test 11/01/21  1046   HGB 13.5         Last Comprehensive Metabolic Panel:  Sodium   Date Value Ref Range Status   02/01/2023 142 136 - 145 mmol/L Final   01/11/2020 137 133 - 144 mmol/L Final     Potassium   Date Value Ref Range Status   02/01/2023 4.5 3.4 - 5.3 mmol/L Final   01/11/2020 4.9 3.4 - 5.3 mmol/L Final     Chloride   Date Value Ref Range Status   02/01/2023 104 98 - 107 mmol/L Final   01/11/2020 105 94 - 109 mmol/L Final     Carbon Dioxide   Date Value Ref Range Status   01/11/2020 31 20 - 32 mmol/L Final     Carbon Dioxide (CO2)   Date Value Ref Range Status   02/01/2023 30 (H) 22 - 29 mmol/L Final     Anion Gap   Date Value Ref Range Status   02/01/2023 8 7 - 15 mmol/L Final   01/11/2020 1 (L) 3 - 14 " mmol/L Final     Glucose   Date Value Ref Range Status   02/01/2023 165 (H) 70 - 99 mg/dL Final   01/11/2020 145 (H) 70 - 99 mg/dL Final     Comment:     Fasting specimen     Urea Nitrogen   Date Value Ref Range Status   02/01/2023 25.1 (H) 8.0 - 23.0 mg/dL Final   01/11/2020 18 7 - 30 mg/dL Final     Creatinine   Date Value Ref Range Status   02/01/2023 0.82 0.67 - 1.17 mg/dL Final   01/04/2021 0.81 0.72 - 1.25 mg/dL Final     GFR Estimate   Date Value Ref Range Status   02/01/2023 >90 >60 mL/min/1.73m2 Final     Comment:     eGFR calculated using 2021 CKD-EPI equation.   01/04/2021 >60 >=60 ml/min/1.73m2 Final     Calcium   Date Value Ref Range Status   02/01/2023 9.5 8.6 - 10.0 mg/dL Final   01/11/2020 8.9 8.5 - 10.1 mg/dL Final         Diagnostics:  Labs pending at this time.  Results will be reviewed when available.   No EKG required, no history of coronary heart disease, significant arrhythmia, peripheral arterial disease or other structural heart disease.    Revised Cardiac Risk Index (RCRI):  The patient has the following serious cardiovascular risks for perioperative complications:   - No serious cardiac risks = 0 points     RCRI Interpretation: 0 points: Class I (very low risk - 0.4% complication rate)      Signed Electronically by: Janice Arredondo NP  Copy of this evaluation report is provided to requesting physician.

## 2023-02-08 ENCOUNTER — OFFICE VISIT (OUTPATIENT)
Dept: URGENT CARE | Facility: URGENT CARE | Age: 58
End: 2023-02-08
Payer: COMMERCIAL

## 2023-02-08 VITALS
HEIGHT: 71 IN | DIASTOLIC BLOOD PRESSURE: 75 MMHG | HEART RATE: 97 BPM | SYSTOLIC BLOOD PRESSURE: 145 MMHG | RESPIRATION RATE: 18 BRPM | WEIGHT: 188 LBS | BODY MASS INDEX: 26.32 KG/M2 | TEMPERATURE: 97.3 F | OXYGEN SATURATION: 97 %

## 2023-02-08 DIAGNOSIS — R19.7 DIARRHEA, UNSPECIFIED TYPE: Primary | ICD-10-CM

## 2023-02-08 DIAGNOSIS — R19.7 DIARRHEA, UNSPECIFIED TYPE: ICD-10-CM

## 2023-02-08 PROCEDURE — 99213 OFFICE O/P EST LOW 20 MIN: CPT | Performed by: FAMILY MEDICINE

## 2023-02-08 NOTE — H&P (VIEW-ONLY)
"Assessment & Plan     Diarrhea, unspecified type  Will treat with pepto and imodium, send culture if continues.  - Enteric Bacteria and Virus Panel by ANNAMARIE Stool             No follow-ups on file.    Harry Owens MD  [facility]    Danika Cody is a 57 year old male who presents to clinic today for the following health issues:  Chief Complaint   Patient presents with     Diarrhea     Watery for diarrhea, no other symptoms      HPI    Diarrhea for last few days.  No change  pepto-bismol helped some.  No blood in stool  No fever.  No headache or stomach ache.  Had diarrhea last night.   This morning had diarrhea.  No nausea or vomiting.  Water even causing.  No travel or recent antibiotic use.  No change in medicine.        Review of Systems        Objective    BP (!) 145/75 (BP Location: Right arm, Patient Position: Sitting, Cuff Size: Adult Large)   Pulse 97   Temp 97.3  F (36.3  C) (Tympanic)   Resp 18   Ht 1.803 m (5' 11\")   Wt 85.3 kg (188 lb)   SpO2 97%   BMI 26.22 kg/m    Physical Exam  Vitals and nursing note reviewed.   Constitutional:       Appearance: Normal appearance.   HENT:      Right Ear: Tympanic membrane normal.      Left Ear: Tympanic membrane normal.      Mouth/Throat:      Mouth: Mucous membranes are moist.   Eyes:      Pupils: Pupils are equal, round, and reactive to light.   Cardiovascular:      Rate and Rhythm: Normal rate and regular rhythm.      Pulses: Normal pulses.      Heart sounds: Normal heart sounds.   Pulmonary:      Effort: Pulmonary effort is normal.      Breath sounds: Normal breath sounds.   Abdominal:      General: Abdomen is flat.      Palpations: Abdomen is soft.   Musculoskeletal:      Cervical back: Neck supple.   Neurological:      Mental Status: He is alert.                    "

## 2023-02-08 NOTE — PATIENT INSTRUCTIONS
You can use pepto-bismol and imodium(use with first epidose of diarrhea-2 tabs than 1 tab with each subsequent episode).

## 2023-02-08 NOTE — PROGRESS NOTES
"Assessment & Plan     Diarrhea, unspecified type  Will treat with pepto and imodium, send culture if continues.  - Enteric Bacteria and Virus Panel by ANNAMARIE Stool             No follow-ups on file.    Harry Owens MD  [facility]    Danika Cody is a 57 year old male who presents to clinic today for the following health issues:  Chief Complaint   Patient presents with     Diarrhea     Watery for diarrhea, no other symptoms      HPI    Diarrhea for last few days.  No change  pepto-bismol helped some.  No blood in stool  No fever.  No headache or stomach ache.  Had diarrhea last night.   This morning had diarrhea.  No nausea or vomiting.  Water even causing.  No travel or recent antibiotic use.  No change in medicine.        Review of Systems        Objective    BP (!) 145/75 (BP Location: Right arm, Patient Position: Sitting, Cuff Size: Adult Large)   Pulse 97   Temp 97.3  F (36.3  C) (Tympanic)   Resp 18   Ht 1.803 m (5' 11\")   Wt 85.3 kg (188 lb)   SpO2 97%   BMI 26.22 kg/m    Physical Exam  Vitals and nursing note reviewed.   Constitutional:       Appearance: Normal appearance.   HENT:      Right Ear: Tympanic membrane normal.      Left Ear: Tympanic membrane normal.      Mouth/Throat:      Mouth: Mucous membranes are moist.   Eyes:      Pupils: Pupils are equal, round, and reactive to light.   Cardiovascular:      Rate and Rhythm: Normal rate and regular rhythm.      Pulses: Normal pulses.      Heart sounds: Normal heart sounds.   Pulmonary:      Effort: Pulmonary effort is normal.      Breath sounds: Normal breath sounds.   Abdominal:      General: Abdomen is flat.      Palpations: Abdomen is soft.   Musculoskeletal:      Cervical back: Neck supple.   Neurological:      Mental Status: He is alert.                    "

## 2023-02-09 PROCEDURE — 87506 IADNA-DNA/RNA PROBE TQ 6-11: CPT

## 2023-02-17 ENCOUNTER — HOSPITAL ENCOUNTER (OUTPATIENT)
Facility: CLINIC | Age: 58
Discharge: HOME | End: 2023-02-17
Attending: ORTHOPAEDIC SURGERY | Admitting: ORTHOPAEDIC SURGERY
Payer: COMMERCIAL

## 2023-02-17 ENCOUNTER — APPOINTMENT (OUTPATIENT)
Dept: GENERAL RADIOLOGY | Facility: CLINIC | Age: 58
End: 2023-02-17
Attending: ORTHOPAEDIC SURGERY
Payer: COMMERCIAL

## 2023-02-17 ENCOUNTER — ANESTHESIA (OUTPATIENT)
Dept: SURGERY | Facility: CLINIC | Age: 58
End: 2023-02-17
Payer: COMMERCIAL

## 2023-02-17 ENCOUNTER — ANESTHESIA EVENT (OUTPATIENT)
Dept: SURGERY | Facility: CLINIC | Age: 58
End: 2023-02-17
Payer: COMMERCIAL

## 2023-02-17 VITALS
SYSTOLIC BLOOD PRESSURE: 136 MMHG | BODY MASS INDEX: 26.57 KG/M2 | WEIGHT: 189.82 LBS | OXYGEN SATURATION: 95 % | RESPIRATION RATE: 16 BRPM | TEMPERATURE: 97.2 F | HEART RATE: 63 BPM | HEIGHT: 71 IN | DIASTOLIC BLOOD PRESSURE: 72 MMHG

## 2023-02-17 DIAGNOSIS — M43.10 SPONDYLOLISTHESIS, GRADE 1: ICD-10-CM

## 2023-02-17 DIAGNOSIS — M51.26 HERNIATED LUMBAR INTERVERTEBRAL DISC: Primary | ICD-10-CM

## 2023-02-17 DIAGNOSIS — M54.16 LUMBAR RADICULOPATHY: ICD-10-CM

## 2023-02-17 LAB
ERYTHROCYTE [DISTWIDTH] IN BLOOD BY AUTOMATED COUNT: 13.2 % (ref 10–15)
GLUCOSE BLDC GLUCOMTR-MCNC: 163 MG/DL (ref 70–99)
GLUCOSE BLDC GLUCOMTR-MCNC: 165 MG/DL (ref 70–99)
HCT VFR BLD AUTO: 40.2 % (ref 40–53)
HGB BLD-MCNC: 13.4 G/DL (ref 13.3–17.7)
MCH RBC QN AUTO: 30.7 PG (ref 26.5–33)
MCHC RBC AUTO-ENTMCNC: 33.3 G/DL (ref 31.5–36.5)
MCV RBC AUTO: 92 FL (ref 78–100)
PLATELET # BLD AUTO: 213 10E3/UL (ref 150–450)
RBC # BLD AUTO: 4.37 10E6/UL (ref 4.4–5.9)
WBC # BLD AUTO: 5 10E3/UL (ref 4–11)

## 2023-02-17 PROCEDURE — 82962 GLUCOSE BLOOD TEST: CPT

## 2023-02-17 PROCEDURE — 710N000010 HC RECOVERY PHASE 1, LEVEL 2, PER MIN: Performed by: ORTHOPAEDIC SURGERY

## 2023-02-17 PROCEDURE — 250N000013 HC RX MED GY IP 250 OP 250 PS 637: Performed by: ANESTHESIOLOGY

## 2023-02-17 PROCEDURE — 250N000025 HC SEVOFLURANE, PER MIN: Performed by: ORTHOPAEDIC SURGERY

## 2023-02-17 PROCEDURE — 999N000141 HC STATISTIC PRE-PROCEDURE NURSING ASSESSMENT: Performed by: ORTHOPAEDIC SURGERY

## 2023-02-17 PROCEDURE — 250N000013 HC RX MED GY IP 250 OP 250 PS 637: Performed by: ORTHOPAEDIC SURGERY

## 2023-02-17 PROCEDURE — 370N000017 HC ANESTHESIA TECHNICAL FEE, PER MIN: Performed by: ORTHOPAEDIC SURGERY

## 2023-02-17 PROCEDURE — 272N000001 HC OR GENERAL SUPPLY STERILE: Performed by: ORTHOPAEDIC SURGERY

## 2023-02-17 PROCEDURE — 250N000009 HC RX 250

## 2023-02-17 PROCEDURE — 360N000084 HC SURGERY LEVEL 4 W/ FLUORO, PER MIN: Performed by: ORTHOPAEDIC SURGERY

## 2023-02-17 PROCEDURE — 250N000011 HC RX IP 250 OP 636

## 2023-02-17 PROCEDURE — 710N000012 HC RECOVERY PHASE 2, PER MINUTE: Performed by: ORTHOPAEDIC SURGERY

## 2023-02-17 PROCEDURE — 250N000011 HC RX IP 250 OP 636: Performed by: ORTHOPAEDIC SURGERY

## 2023-02-17 PROCEDURE — 93005 ELECTROCARDIOGRAM TRACING: CPT

## 2023-02-17 PROCEDURE — 93010 ELECTROCARDIOGRAM REPORT: CPT | Performed by: INTERNAL MEDICINE

## 2023-02-17 PROCEDURE — 999N000054 HC STATISTIC EKG NON-CHARGEABLE

## 2023-02-17 PROCEDURE — 250N000009 HC RX 250: Performed by: ORTHOPAEDIC SURGERY

## 2023-02-17 PROCEDURE — 63030 LAMOT DCMPRN NRV RT 1 LMBR: CPT | Mod: RT | Performed by: ORTHOPAEDIC SURGERY

## 2023-02-17 PROCEDURE — 258N000003 HC RX IP 258 OP 636

## 2023-02-17 PROCEDURE — 258N000003 HC RX IP 258 OP 636: Performed by: ORTHOPAEDIC SURGERY

## 2023-02-17 PROCEDURE — 250N000011 HC RX IP 250 OP 636: Performed by: NURSE ANESTHETIST, CERTIFIED REGISTERED

## 2023-02-17 PROCEDURE — 250N000009 HC RX 250: Performed by: NURSE ANESTHETIST, CERTIFIED REGISTERED

## 2023-02-17 PROCEDURE — 250N000013 HC RX MED GY IP 250 OP 250 PS 637: Performed by: STUDENT IN AN ORGANIZED HEALTH CARE EDUCATION/TRAINING PROGRAM

## 2023-02-17 PROCEDURE — 999N000180 XR SURGERY CARM FLUORO LESS THAN 5 MIN: Mod: TC

## 2023-02-17 PROCEDURE — 258N000003 HC RX IP 258 OP 636: Performed by: NURSE ANESTHETIST, CERTIFIED REGISTERED

## 2023-02-17 PROCEDURE — 85027 COMPLETE CBC AUTOMATED: CPT | Performed by: ANESTHESIOLOGY

## 2023-02-17 PROCEDURE — 272N000002 HC OR SUPPLY OTHER OPNP: Performed by: ORTHOPAEDIC SURGERY

## 2023-02-17 PROCEDURE — 250N000011 HC RX IP 250 OP 636: Performed by: ANESTHESIOLOGY

## 2023-02-17 RX ORDER — ONDANSETRON 2 MG/ML
INJECTION INTRAMUSCULAR; INTRAVENOUS PRN
Status: DISCONTINUED | OUTPATIENT
Start: 2023-02-17 | End: 2023-02-17

## 2023-02-17 RX ORDER — HYDROMORPHONE HYDROCHLORIDE 1 MG/ML
0.4 INJECTION, SOLUTION INTRAMUSCULAR; INTRAVENOUS; SUBCUTANEOUS
Status: DISCONTINUED | OUTPATIENT
Start: 2023-02-17 | End: 2023-02-17 | Stop reason: HOSPADM

## 2023-02-17 RX ORDER — ONDANSETRON 4 MG/1
4 TABLET, ORALLY DISINTEGRATING ORAL EVERY 30 MIN PRN
Status: DISCONTINUED | OUTPATIENT
Start: 2023-02-17 | End: 2023-02-17 | Stop reason: HOSPADM

## 2023-02-17 RX ORDER — ACETAMINOPHEN 325 MG/1
650 TABLET ORAL EVERY 4 HOURS PRN
Status: DISCONTINUED | OUTPATIENT
Start: 2023-02-20 | End: 2023-02-17 | Stop reason: HOSPADM

## 2023-02-17 RX ORDER — HYDROMORPHONE HYDROCHLORIDE 1 MG/ML
0.2 INJECTION, SOLUTION INTRAMUSCULAR; INTRAVENOUS; SUBCUTANEOUS
Status: DISCONTINUED | OUTPATIENT
Start: 2023-02-17 | End: 2023-02-17 | Stop reason: HOSPADM

## 2023-02-17 RX ORDER — OXYCODONE HYDROCHLORIDE 5 MG/1
5-10 TABLET ORAL EVERY 4 HOURS PRN
Qty: 16 TABLET | Refills: 0 | Status: CANCELLED | OUTPATIENT
Start: 2023-02-17

## 2023-02-17 RX ORDER — OXYCODONE HYDROCHLORIDE 10 MG/1
10 TABLET ORAL EVERY 4 HOURS PRN
Status: DISCONTINUED | OUTPATIENT
Start: 2023-02-17 | End: 2023-02-17 | Stop reason: HOSPADM

## 2023-02-17 RX ORDER — HYDROMORPHONE HYDROCHLORIDE 1 MG/ML
0.2 INJECTION, SOLUTION INTRAMUSCULAR; INTRAVENOUS; SUBCUTANEOUS EVERY 5 MIN PRN
Status: DISCONTINUED | OUTPATIENT
Start: 2023-02-17 | End: 2023-02-17 | Stop reason: HOSPADM

## 2023-02-17 RX ORDER — CEFAZOLIN SODIUM/WATER 2 G/20 ML
2 SYRINGE (ML) INTRAVENOUS
Status: COMPLETED | OUTPATIENT
Start: 2023-02-17 | End: 2023-02-17

## 2023-02-17 RX ORDER — FENTANYL CITRATE 50 UG/ML
50 INJECTION, SOLUTION INTRAMUSCULAR; INTRAVENOUS EVERY 5 MIN PRN
Status: DISCONTINUED | OUTPATIENT
Start: 2023-02-17 | End: 2023-02-17 | Stop reason: HOSPADM

## 2023-02-17 RX ORDER — LIDOCAINE HYDROCHLORIDE 20 MG/ML
INJECTION, SOLUTION INFILTRATION; PERINEURAL PRN
Status: DISCONTINUED | OUTPATIENT
Start: 2023-02-17 | End: 2023-02-17

## 2023-02-17 RX ORDER — ONDANSETRON 4 MG/1
4 TABLET, ORALLY DISINTEGRATING ORAL EVERY 6 HOURS PRN
Status: DISCONTINUED | OUTPATIENT
Start: 2023-02-17 | End: 2023-02-17 | Stop reason: HOSPADM

## 2023-02-17 RX ORDER — METHOCARBAMOL 750 MG/1
750 TABLET, FILM COATED ORAL EVERY 6 HOURS PRN
Qty: 60 TABLET | Refills: 0 | Status: SHIPPED | OUTPATIENT
Start: 2023-02-17 | End: 2023-10-30

## 2023-02-17 RX ORDER — SODIUM CHLORIDE, SODIUM LACTATE, POTASSIUM CHLORIDE, CALCIUM CHLORIDE 600; 310; 30; 20 MG/100ML; MG/100ML; MG/100ML; MG/100ML
INJECTION, SOLUTION INTRAVENOUS CONTINUOUS PRN
Status: DISCONTINUED | OUTPATIENT
Start: 2023-02-17 | End: 2023-02-17

## 2023-02-17 RX ORDER — DEXAMETHASONE SODIUM PHOSPHATE 4 MG/ML
8 INJECTION, SOLUTION INTRA-ARTICULAR; INTRALESIONAL; INTRAMUSCULAR; INTRAVENOUS; SOFT TISSUE EVERY 8 HOURS
Status: DISCONTINUED | OUTPATIENT
Start: 2023-02-17 | End: 2023-02-17 | Stop reason: HOSPADM

## 2023-02-17 RX ORDER — BUPIVACAINE HYDROCHLORIDE AND EPINEPHRINE 5; 5 MG/ML; UG/ML
INJECTION, SOLUTION EPIDURAL; INTRACAUDAL; PERINEURAL PRN
Status: DISCONTINUED | OUTPATIENT
Start: 2023-02-17 | End: 2023-02-17 | Stop reason: HOSPADM

## 2023-02-17 RX ORDER — CELECOXIB 200 MG/1
400 CAPSULE ORAL ONCE
Status: COMPLETED | OUTPATIENT
Start: 2023-02-17 | End: 2023-02-17

## 2023-02-17 RX ORDER — OXYCODONE HYDROCHLORIDE 5 MG/1
5 TABLET ORAL EVERY 4 HOURS PRN
Status: DISCONTINUED | OUTPATIENT
Start: 2023-02-17 | End: 2023-02-17 | Stop reason: HOSPADM

## 2023-02-17 RX ORDER — SODIUM CHLORIDE, SODIUM LACTATE, POTASSIUM CHLORIDE, CALCIUM CHLORIDE 600; 310; 30; 20 MG/100ML; MG/100ML; MG/100ML; MG/100ML
INJECTION, SOLUTION INTRAVENOUS CONTINUOUS
Status: DISCONTINUED | OUTPATIENT
Start: 2023-02-17 | End: 2023-02-17 | Stop reason: HOSPADM

## 2023-02-17 RX ORDER — ACETAMINOPHEN 325 MG/1
650 TABLET ORAL EVERY 4 HOURS PRN
Qty: 50 TABLET | Refills: 0 | Status: SHIPPED | OUTPATIENT
Start: 2023-02-17 | End: 2023-10-30

## 2023-02-17 RX ORDER — VANCOMYCIN HYDROCHLORIDE 1 G/20ML
INJECTION, POWDER, LYOPHILIZED, FOR SOLUTION INTRAVENOUS PRN
Status: DISCONTINUED | OUTPATIENT
Start: 2023-02-17 | End: 2023-02-17 | Stop reason: HOSPADM

## 2023-02-17 RX ORDER — DEXAMETHASONE SODIUM PHOSPHATE 4 MG/ML
INJECTION, SOLUTION INTRA-ARTICULAR; INTRALESIONAL; INTRAMUSCULAR; INTRAVENOUS; SOFT TISSUE PRN
Status: DISCONTINUED | OUTPATIENT
Start: 2023-02-17 | End: 2023-02-17 | Stop reason: HOSPADM

## 2023-02-17 RX ORDER — ACETAMINOPHEN 325 MG/1
975 TABLET ORAL EVERY 8 HOURS
Status: DISCONTINUED | OUTPATIENT
Start: 2023-02-17 | End: 2023-02-17 | Stop reason: HOSPADM

## 2023-02-17 RX ORDER — FENTANYL CITRATE 50 UG/ML
25 INJECTION, SOLUTION INTRAMUSCULAR; INTRAVENOUS EVERY 5 MIN PRN
Status: DISCONTINUED | OUTPATIENT
Start: 2023-02-17 | End: 2023-02-17 | Stop reason: HOSPADM

## 2023-02-17 RX ORDER — CEFAZOLIN SODIUM/WATER 2 G/20 ML
2 SYRINGE (ML) INTRAVENOUS SEE ADMIN INSTRUCTIONS
Status: DISCONTINUED | OUTPATIENT
Start: 2023-02-17 | End: 2023-02-17 | Stop reason: HOSPADM

## 2023-02-17 RX ORDER — ACETAMINOPHEN 325 MG/1
975 TABLET ORAL ONCE
Status: COMPLETED | OUTPATIENT
Start: 2023-02-17 | End: 2023-02-17

## 2023-02-17 RX ORDER — LIDOCAINE 40 MG/G
CREAM TOPICAL
Status: DISCONTINUED | OUTPATIENT
Start: 2023-02-17 | End: 2023-02-17 | Stop reason: HOSPADM

## 2023-02-17 RX ORDER — OXYCODONE HYDROCHLORIDE 5 MG/1
5 TABLET ORAL EVERY 4 HOURS PRN
Qty: 15 TABLET | Refills: 0 | Status: SHIPPED | OUTPATIENT
Start: 2023-02-17 | End: 2023-03-04

## 2023-02-17 RX ORDER — ONDANSETRON 2 MG/ML
4 INJECTION INTRAMUSCULAR; INTRAVENOUS EVERY 6 HOURS PRN
Status: DISCONTINUED | OUTPATIENT
Start: 2023-02-17 | End: 2023-02-17 | Stop reason: HOSPADM

## 2023-02-17 RX ORDER — HYDROMORPHONE HYDROCHLORIDE 1 MG/ML
0.4 INJECTION, SOLUTION INTRAMUSCULAR; INTRAVENOUS; SUBCUTANEOUS EVERY 5 MIN PRN
Status: DISCONTINUED | OUTPATIENT
Start: 2023-02-17 | End: 2023-02-17 | Stop reason: HOSPADM

## 2023-02-17 RX ORDER — FENTANYL CITRATE 50 UG/ML
INJECTION, SOLUTION INTRAMUSCULAR; INTRAVENOUS PRN
Status: DISCONTINUED | OUTPATIENT
Start: 2023-02-17 | End: 2023-02-17

## 2023-02-17 RX ORDER — PROCHLORPERAZINE MALEATE 10 MG
10 TABLET ORAL EVERY 6 HOURS PRN
Status: DISCONTINUED | OUTPATIENT
Start: 2023-02-17 | End: 2023-02-17 | Stop reason: HOSPADM

## 2023-02-17 RX ORDER — ONDANSETRON 2 MG/ML
4 INJECTION INTRAMUSCULAR; INTRAVENOUS EVERY 30 MIN PRN
Status: DISCONTINUED | OUTPATIENT
Start: 2023-02-17 | End: 2023-02-17 | Stop reason: HOSPADM

## 2023-02-17 RX ORDER — PROPOFOL 10 MG/ML
INJECTION, EMULSION INTRAVENOUS PRN
Status: DISCONTINUED | OUTPATIENT
Start: 2023-02-17 | End: 2023-02-17

## 2023-02-17 RX ADMIN — SODIUM CHLORIDE, POTASSIUM CHLORIDE, SODIUM LACTATE AND CALCIUM CHLORIDE: 600; 310; 30; 20 INJECTION, SOLUTION INTRAVENOUS at 16:37

## 2023-02-17 RX ADMIN — SUGAMMADEX 200 MG: 100 INJECTION, SOLUTION INTRAVENOUS at 16:42

## 2023-02-17 RX ADMIN — TRANEXAMIC ACID 1 G: 100 INJECTION INTRAVENOUS at 15:29

## 2023-02-17 RX ADMIN — HYDROMORPHONE HYDROCHLORIDE 0.2 MG: 1 INJECTION, SOLUTION INTRAMUSCULAR; INTRAVENOUS; SUBCUTANEOUS at 17:38

## 2023-02-17 RX ADMIN — HYDROMORPHONE HYDROCHLORIDE 0.5 MG: 1 INJECTION, SOLUTION INTRAMUSCULAR; INTRAVENOUS; SUBCUTANEOUS at 15:44

## 2023-02-17 RX ADMIN — PHENYLEPHRINE HYDROCHLORIDE 100 MCG: 10 INJECTION INTRAVENOUS at 15:57

## 2023-02-17 RX ADMIN — Medication 20 MG: at 16:03

## 2023-02-17 RX ADMIN — ONDANSETRON 4 MG: 2 INJECTION INTRAMUSCULAR; INTRAVENOUS at 16:23

## 2023-02-17 RX ADMIN — FENTANYL CITRATE 25 MCG: 50 INJECTION, SOLUTION INTRAMUSCULAR; INTRAVENOUS at 17:21

## 2023-02-17 RX ADMIN — DEXMEDETOMIDINE HYDROCHLORIDE 12 MCG: 100 INJECTION, SOLUTION INTRAVENOUS at 15:38

## 2023-02-17 RX ADMIN — LIDOCAINE HYDROCHLORIDE 100 MG: 20 INJECTION, SOLUTION INFILTRATION; PERINEURAL at 15:01

## 2023-02-17 RX ADMIN — OXYCODONE HYDROCHLORIDE 5 MG: 5 TABLET ORAL at 17:52

## 2023-02-17 RX ADMIN — CELECOXIB 400 MG: 200 CAPSULE ORAL at 12:42

## 2023-02-17 RX ADMIN — ACETAMINOPHEN 975 MG: 325 TABLET ORAL at 12:42

## 2023-02-17 RX ADMIN — FENTANYL CITRATE 25 MCG: 50 INJECTION, SOLUTION INTRAMUSCULAR; INTRAVENOUS at 17:16

## 2023-02-17 RX ADMIN — ACETAMINOPHEN 975 MG: 325 TABLET ORAL at 18:13

## 2023-02-17 RX ADMIN — PHENYLEPHRINE HYDROCHLORIDE 0.3 MCG/KG/MIN: 10 INJECTION INTRAVENOUS at 16:19

## 2023-02-17 RX ADMIN — DEXMEDETOMIDINE HYDROCHLORIDE 8 MCG: 100 INJECTION, SOLUTION INTRAVENOUS at 15:41

## 2023-02-17 RX ADMIN — PHENYLEPHRINE HYDROCHLORIDE 100 MCG: 10 INJECTION INTRAVENOUS at 16:18

## 2023-02-17 RX ADMIN — MIDAZOLAM 2 MG: 1 INJECTION INTRAMUSCULAR; INTRAVENOUS at 14:52

## 2023-02-17 RX ADMIN — PROPOFOL 200 MG: 10 INJECTION, EMULSION INTRAVENOUS at 15:01

## 2023-02-17 RX ADMIN — PHENYLEPHRINE HYDROCHLORIDE 100 MCG: 10 INJECTION INTRAVENOUS at 15:19

## 2023-02-17 RX ADMIN — Medication 50 MG: at 15:02

## 2023-02-17 RX ADMIN — SODIUM CHLORIDE, POTASSIUM CHLORIDE, SODIUM LACTATE AND CALCIUM CHLORIDE: 600; 310; 30; 20 INJECTION, SOLUTION INTRAVENOUS at 14:52

## 2023-02-17 RX ADMIN — FENTANYL CITRATE 100 MCG: 50 INJECTION, SOLUTION INTRAMUSCULAR; INTRAVENOUS at 15:01

## 2023-02-17 RX ADMIN — Medication 2 G: at 15:26

## 2023-02-17 ASSESSMENT — ACTIVITIES OF DAILY LIVING (ADL)
ADLS_ACUITY_SCORE: 37

## 2023-02-17 NOTE — ANESTHESIA POSTPROCEDURE EVALUATION
Patient: Rangel Singh    Procedure: Procedure(s):  Right Lumbar 4-5 Microdiscectomy       Anesthesia Type:  General    Note:  Disposition: Outpatient   Postop Pain Control: Uneventful            Sign Out: Well controlled pain   PONV: No   Neuro/Psych: Uneventful            Sign Out: Acceptable/Baseline neuro status   Airway/Respiratory: Uneventful            Sign Out: Acceptable/Baseline resp. status   CV/Hemodynamics: Uneventful            Sign Out: Acceptable CV status; No obvious hypovolemia; No obvious fluid overload   Other NRE: NONE   DID A NON-ROUTINE EVENT OCCUR? No           Last vitals:  Vitals Value Taken Time   /72 02/17/23 1745   Temp 36.1  C (97  F) 02/17/23 1653   Pulse 69 02/17/23 1753   Resp 7 02/17/23 1753   SpO2 94 % 02/17/23 1753   Vitals shown include unvalidated device data.    Electronically Signed By: Pramod Florentino MD  February 17, 2023  5:54 PM

## 2023-02-17 NOTE — ANESTHESIA PREPROCEDURE EVALUATION
Anesthesia Pre-Procedure Evaluation    Patient: Rangel Singh   MRN: [id number] : 1965        Procedure : Procedure(s):  Right Lumbar 4-5 Microdiscectomy          Past Medical History:   Diagnosis Date     CELLULITIS NOS     left danny-auricular 2004     Facial cellulitis 2014     Type 1 diabetes mellitus (H)       Past Surgical History:   Procedure Laterality Date     COLONOSCOPY  1/3/2014    Procedure: COMBINED COLONOSCOPY, SINGLE BIOPSY/POLYPECTOMY BY BIOPSY;  Colonoscopy;  Surgeon: Ed Galvez MD;  Location: [facility]     EXAM UNDER ANESTHESIA, MANIPULATE JOINT (LOCATION) Left 3/30/2016    Procedure: EXAM UNDER ANESTHESIA, MANIPULATE JOINT (LOCATION);  Surgeon: Justen Ford MD;  Location: [facility]     SURGICAL HISTORY OF -       appendectomy ([place], [place])       Allergies   Allergen Reactions     Nkda [No Known Drug Allergies]       Social History     Tobacco Use     Smoking status: Never     Smokeless tobacco: Never   Substance Use Topics     Alcohol use: Yes     Comment: occ. 2-3 times a month      Wt Readings from Last 1 Encounters:   23 86.1 kg (189 lb 13.1 oz)        Anesthesia Evaluation   Pt has had prior anesthetic. Type: MAC.    No history of anesthetic complications       ROS/MED HX  ENT/Pulmonary: Comment: borderline HERO diagnosis 15 years ago.  - neg pulmonary ROS   (+) HERO risk factors, snores loudly,     Neurologic: Comment: Sciatica and right foot neuropathy.       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 Sopped exercising in 2022 because of sciatic pain.     (+) Dyslipidemia -----Previous cardiac testing   Echo: Date:  Results:  Borderline LV and RV hypertrophy, good EF.  Stress Test: Date: Results:    ECG Reviewed: Date: 2023 Results:  LBBB, new, not present on the ECG in 2016.   Cath: Date: Results:      METS/Exercise Tolerance: >4 METS    Hematologic:       Musculoskeletal:        GI/Hepatic:       Renal/Genitourinary:       Endo:     (+) type I DM, Last HgA1c: <5.7, date: 02/23, Using insulin, - using insulin pump. Diabetic complications: retinopathy.     Psychiatric/Substance Use:  - neg psychiatric ROS     Infectious Disease:  - neg infectious disease ROS     Malignancy:  - neg malignancy ROS     Other:            Physical Exam    Airway        Mallampati: II   TM distance: > 3 FB   Neck ROM: full   Mouth opening: > 3 cm    Respiratory Devices and Support         Dental           Cardiovascular   cardiovascular exam normal          Pulmonary   pulmonary exam normal                OUTSIDE LABS:  CBC:   Lab Results   Component Value Date    WBC 4.6 11/01/2021    WBC 3.1 (L) 01/11/2020    HGB 13.5 11/01/2021    HGB 14.4 01/11/2020    HCT 40.6 11/01/2021    HCT 45.0 01/11/2020     11/01/2021     01/11/2020     BMP:   Lab Results   Component Value Date     02/01/2023     01/11/2020    POTASSIUM 4.5 02/01/2023    POTASSIUM 4.9 01/11/2020    CHLORIDE 104 02/01/2023    CHLORIDE 105 01/11/2020    CO2 30 (H) 02/01/2023    CO2 31 01/11/2020    BUN 25.1 (H) 02/01/2023    BUN 18 01/11/2020    CR 0.82 02/01/2023    CR 0.81 01/04/2021     (H) 02/17/2023     (H) 02/01/2023     COAGS: No results found for: PTT, INR, FIBR  POC:   Lab Results   Component Value Date    BGM 82 03/30/2016     HEPATIC:   Lab Results   Component Value Date    ALBUMIN 3.8 01/11/2020    PROTTOTAL 7.2 01/11/2020    ALT 31 01/11/2020    AST 25 01/11/2020    ALKPHOS 96 01/11/2020    BILITOTAL 0.7 01/11/2020     OTHER:   Lab Results   Component Value Date    A1C 5.9 (H) 01/04/2021    ISMAEL 9.5 02/01/2023    LIPASE 95 03/06/2019    TSH 2.03 01/04/2021       Anesthesia Plan    ASA Status:  3      Anesthesia Type: General.     - Airway: ETT         Techniques and Equipment:     - Airway: Video-Laryngoscope     - Lines/Monitors: 2nd IV     Consents    Anesthesia Plan(s) and associated risks,  benefits, and realistic alternatives discussed. Questions answered and patient/representative(s) expressed understanding.    - Discussed:     - Discussed with:  Patient         Postoperative Care    Pain management: Multi-modal analgesia.   PONV prophylaxis: Ondansetron (or other 5HT-3), Dexamethasone or Solumedrol     Comments:    Other Comments: ECG shows new LBBB, with excellent activity level before sciatica pain last fall. Old SANDRA showed Borderline LV and RV hypertrophy, good EF.    I recommend repeat SANDRA and sleep studies as outpatient after surgery.     Recommend 30 mg Ketamine in OR after induction for multimodal analgesia.            Helen Foster MD

## 2023-02-17 NOTE — OP NOTE
DATE OF SURGERY: 2/17/2023    PREOPERATIVE DIAGNOSIS: Lumbar radiculopathy [M54.16]  Herniated lumbar intervertebral disc [M51.26]  Spondylolisthesis, grade 1 [M43.10]             POSTOPERATIVE DIAGNOSIS: SAME    PROCEDURES:  1. Right L4-L5 microdiscectomy for decompression of the L4 and L5 nerve roots.    PRIMARY SURGEON: Stephan Fregoso MD    FIRST ASSISTANT: Scotty Mendez MD    ANESTHESIA: General Endotracheal    COMPLICATIONS:  None.    SPECIMENS: None.    ESTIMATED BLOOD LOSS: 15 mL    INDICATIONS:                          Rangel Singh is a 57 year old male who elected surgical treatment, and understood the indications for this surgery, as well as its risks, benefits, and alternatives as documented in the pre-operative H&P.  Specifically, we reviewed the risks and benefits of the surgery in detail. The risks include, but are not limited to, the general risks associated with anesthesia, including death, pulmonary embolism, DVT, stroke, myocardial infarction, pneumonia. Additional risks specific to the surgery include the risk of recurrent disc herniation, infection, dural tear with resultant CSF leak which might necessitate placement of a drain or revision surgery or could result in headaches, nerve injury resulting in weakness or paralysis, the risk of vascular injury, need for revision surgery in the future due to one of the above issues, or risk of incomplete symptom relief. Rangel Singh understands the risks of the surgery and wishes to proceed.  No Guarantees were given.       DESCRIPTION OF PROCEDURE:           Rangel Singh was taken to the operating room, where the Anesthesiology Service induced satisfactory general anesthesia. Ancef was given IV.  Venous thromboembolic prophylaxis was performed with sequential devices. The patient was placed prone on a Devyn frame with all bony prominences well padded.  At this point I performed the erector spinae block using a mixture of 15  ml of 0.5% marcaine with 1:200,000 epinephrine and 4 mg of decadron. The surgical site was first wiped with isopropyl alcohol and allowed to dry in order to decrease bacterial burden. The surgical site was then prepped with chloraprep and allowed to dry.  Fluoroscopy was brought in and 10 ml of the erector spinae plane block was injected through an 18 gauge spinal needle immediately dorsal to the L4 and L5 transverse processes using fluoroscopy and palpation of the transverse process. I used these fluoroscopic images to pilar the cranial and caudal aspect of the incision over the midline.  Following completion of the block the low back was then prepped and draped in its entirety in the usual sterile fashion.  We then held a multidisciplinary time out in which we verified the patient, procedure including laterality, antibiotics, and operative plan.  All team members were in agreement.    We made a midline incision and unilateral subperiosteal exposure was performed of the L4 and L5 spinous processes and medial lamina.  A penfield # 4 was placed under the L4 lamina lateral image was then obtained to verify our position. A ifrah retractor was then placed lateral to the facet joint.      The decompression was performed at the L4-5 levels which resulted in the decompression of the L4 and traversing L5 nerve roots.      The caudal aspect of cephalad the lamina was thinned with a match stick bur in layers.  Eventually, the ligamentum flavum was uncovered by the bur in the midline.  A back angled microcurette was then used to develop a plane between the ligamentum flavum and the lamina.  A Stephie was then used to split the ligamentum flavum and develop a plane between flavum and the dura.  The ligamentum flavum was then resected using Kerrison to expose the epidural fat and dura.  After dissecting away the epidural fat and using bipolar electrocautery to obtain hemostasis, the shoulder of the nerve root was identified and  a plane was developed lateral to the nerve root using a Penfield #4.  A D'Errico was then placed to protect the nerve root.  Bipolar was again used to coagulate the ventral venous plexus. The disc was identified and a longitudinal annulotomy was created using a 15 blade.   A nerve hook was used to develop a plane between the annulus and the nucleus below.  The herniated material was mobilized from the vertebral bodies cephalad and caudad.  Micropituitary rongeur was then used to remove the disc material in a piecemeal fashion.  We also identified fragmented disc material within the intervertebral space that was removed in a similar fashion to prevent recurrent herniation of pathologic disc material.  After this I irrigated the wound with sterile saline to dislodge any loose fragments which might later extrude and cause recurrent symptoms.  I then used a nerve hook to palpate ventral an dorsal to the nerve confirming decompression from the shoulder of the nerve root out well into the foramen.      Hemostasis was then obtained using surgiflo and thrombin soaked cottonoids. The wound was closed in layers using #0 vicryl in figure of eight fashion for fascia, 2-0 vicryl for deep dermal layer, 4-0 running subcuticular monocryl for skin closure.  Exofin Exact dressing was placed over the incision and allowed to dry. A sterile dressing of 4x4 gauze and tegaderm was applied to the surgical site. Drapes were removed and the patient was transferred to the hospital cart. He emerged from anesthesia and was extubated. He was taken to the recovery area in stable condition.     POSTOPERATIVE CARES:  Dressing to remain in place for 2 weeks  May shower with dressing in place. Do not submerge surgical site. No creams or lotions at surgical site.   No repetitive bending, twisting or lifting for 6 weeks. No lifting greater than 10 lbs for 6 weeks.   Will return to clinic in 6 weeks.     I was present and scrubbed for the critical  portions of the procedure including positioning, erector spinae plane block, exposure and decompression, and closure.    Stephan Fregoso MD

## 2023-02-17 NOTE — INTERVAL H&P NOTE
"I have reviewed the surgical (or preoperative) H&P that is linked to this encounter, and examined the patient. Noted changes include: LBBB on ECG, CO2 =30 on BMP, probably chronic CO2 retainer, suspicion HERO.     Clinical Conditions Present on Arrival:  Clinically Significant Risk Factors Present on Admission                    # Overweight: Estimated body mass index is 26.47 kg/m  as calculated from the following:    Height as of this encounter: 1.803 m (5' 11\").    Weight as of this encounter: 86.1 kg (189 lb 13.1 oz).       "

## 2023-02-17 NOTE — BRIEF OP NOTE
[facility]    Brief Operative Note    Pre-operative diagnosis: Lumbar radiculopathy [M54.16]  Herniated lumbar intervertebral disc [M51.26]  Spondylolisthesis, grade 1 [M43.10]  Post-operative diagnosis Same as pre-operative diagnosis    Procedure: Procedure(s):  Right Lumbar 4-5 Microdiscectomy  Surgeon: Surgeon(s) and Role:     * Stephan Fregoso MD - Primary     * Scotty Mendez MD - Resident - Assisting     * Joss Soares MD - Resident - Assisting  Anesthesia: General   Estimated Blood Loss: Minimal    Drains: None  Specimens: * No specimens in log *  Findings:   None.  Complications: None.  Implants: * No implants in log *    Orthopedic Surgery Primary  - Activity: Up with assist until independent.  - Weightbearing Status: WBAT.  - Pain Management: Tylenol and ibuprofen; oxycodone for breakthrough.  - Antibiotics: Ancef preoperatively.  - Diet: Begin with clear fluids and progress diet as tolerated.   - DVT Prophylaxis: None.  - Imaging: None.  - Labs: Labs PRN.  - Bracing/Splinting: None.  - Dressings: Keep Aquacel C/D/I x 7 days.  - Drains: None.  - Lord Catheter: None.   - Physical Therapy/Occupational Therapy: None.  - Cultures: None.  - Consults: None.  - Follow-up: Clinic with Dr. Fregoso in 6 weeks with repeat radiographs.    - Disposition: Discharge to home once PACU criteria are met.    Orthopedic Surgery staff for this patient is Dr. Fregoso.    Scotty Mendez MD  Orthopedic Surgery PGY4

## 2023-02-17 NOTE — INTERVAL H&P NOTE
"I have reviewed the surgical (or preoperative) H&P that is linked to this encounter, and examined the patient. There are no significant changes    Clinical Conditions Present on Arrival:  Clinically Significant Risk Factors Present on Admission                    # Overweight: Estimated body mass index is 26.47 kg/m  as calculated from the following:    Height as of this encounter: 1.803 m (5' 11\").    Weight as of this encounter: 86.1 kg (189 lb 13.1 oz).       "

## 2023-02-17 NOTE — ANESTHESIA CARE TRANSFER NOTE
Patient: Rangel Singh    Procedure: Procedure(s):  Right Lumbar 4-5 Microdiscectomy       Diagnosis: Lumbar radiculopathy [M54.16]  Herniated lumbar intervertebral disc [M51.26]  Spondylolisthesis, grade 1 [M43.10]  Diagnosis Additional Information: No value filed.    Anesthesia Type:   General     Note:    Oropharynx: oropharynx clear of all foreign objects and spontaneously breathing  Level of Consciousness: drowsy  Oxygen Supplementation: face mask  Level of Supplemental Oxygen (L/min / FiO2): 6  Independent Airway: airway patency satisfactory and stable  Dentition: dentition unchanged  Vital Signs Stable: post-procedure vital signs reviewed and stable  Report to RN Given: handoff report given  Patient transferred to: PACU    Handoff Report: Identifed the Patient, Identified the Reponsible Provider, Reviewed the pertinent medical history, Discussed the surgical course, Reviewed Intra-OP anesthesia mangement and issues during anesthesia, Set expectations for post-procedure period and Allowed opportunity for questions and acknowledgement of understanding      Vitals:  Vitals Value Taken Time   /74 02/17/23 1653   Temp     Pulse 64 02/17/23 1655   Resp 12 02/17/23 1655   SpO2 96 % 02/17/23 1655   Vitals shown include unvalidated device data.    Electronically Signed By: LEXI Yeager CRNA  February 17, 2023  4:56 PM

## 2023-02-17 NOTE — ANESTHESIA PROCEDURE NOTES
Airway       Patient location during procedure: OR       Procedure Start/Stop Times: 2/17/2023 3:03 PM  Staff -        Anesthesiologist:  Pramod Florentino MD       CRNA: Vibha Boogie APRN CRNA       Performed By: CRNAIndications and Patient Condition       Indications for airway management: danny-procedural       Induction type:intravenous       Mask difficulty assessment: 1 - vent by mask    Final Airway Details       Final airway type: endotracheal airway       Successful airway: ETT - single  Endotracheal Airway Details        ETT size (mm): 8.0       Cuffed: yes       Tracheal cuff pressure (cm H2O): 20       Successful intubation technique: direct laryngoscopy       DL Blade Type: MAC 3       Adjucts: stylet       Position: Right       Measured from: lips       Secured at (cm): 24       Bite block used: Soft    Post intubation assessment        Placement verified by: capnometry, equal breath sounds and chest rise        Number of attempts at approach: 1       Number of other approaches attempted: 0       Secured with: silk tape and other (comment) (cavilon barrier film)       Ease of procedure: easy       Dentition: Intact and Unchanged    Medication(s) Administered   Medication Administration Time: 2/17/2023 3:03 PM

## 2023-02-18 NOTE — DISCHARGE INSTRUCTIONS

## 2023-02-18 NOTE — OR NURSING
Dr. Soares and Dr. Mendez notified that patient tried but unable to pee. Drs approved discharge without voiding. Patient and his wife educated on the importance of voiding within 8-10 hrs of surgery and that they should go to emergency if unable to pee and feeling of fullness. Also verified with Drs that aquacell to remain on for 7 days and patient & wife educated as such.

## 2023-02-20 ENCOUNTER — MYC MEDICAL ADVICE (OUTPATIENT)
Dept: FAMILY MEDICINE | Facility: CLINIC | Age: 58
End: 2023-02-20
Payer: COMMERCIAL

## 2023-02-20 LAB
ATRIAL RATE - MUSE: 63 BPM
DIASTOLIC BLOOD PRESSURE - MUSE: NORMAL MMHG
INTERPRETATION ECG - MUSE: NORMAL
P AXIS - MUSE: 41 DEGREES
PR INTERVAL - MUSE: 192 MS
QRS DURATION - MUSE: 146 MS
QT - MUSE: 470 MS
QTC - MUSE: 480 MS
R AXIS - MUSE: -39 DEGREES
SYSTOLIC BLOOD PRESSURE - MUSE: NORMAL MMHG
T AXIS - MUSE: 94 DEGREES
VENTRICULAR RATE- MUSE: 63 BPM

## 2023-02-21 DIAGNOSIS — I44.7 LBBB (LEFT BUNDLE BRANCH BLOCK): Primary | ICD-10-CM

## 2023-02-21 NOTE — TELEPHONE ENCOUNTER
Please forward this to Janice Arredondo since she did his preop exam and it appears the surgeon is wanting a further evaluation.  Patel Boogie MD  Family Medicine

## 2023-02-21 NOTE — PROGRESS NOTES
Called patient.  With new LBBB change on EKG this warrants ECHO for LVEF with hx of borderline left ventricular hypertrophy.  Patient is asymptomatic so this is not emergent.  I have placed the order and he will call to get this completed.  Janice Arredondo NP on 2/21/2023 at 12:09 PM

## 2023-02-21 NOTE — TELEPHONE ENCOUNTER
Dr Boogie, Per My Chart message , pt was told he should have an Echocardiogram , by his surgery team on 2/17.  How should we proceed?

## 2023-02-22 ENCOUNTER — HOSPITAL ENCOUNTER (OUTPATIENT)
Dept: CARDIOLOGY | Facility: CLINIC | Age: 58
Discharge: HOME | End: 2023-02-22
Attending: NURSE PRACTITIONER | Admitting: NURSE PRACTITIONER
Payer: COMMERCIAL

## 2023-02-22 DIAGNOSIS — I44.7 LBBB (LEFT BUNDLE BRANCH BLOCK): ICD-10-CM

## 2023-02-22 LAB — LVEF ECHO: NORMAL

## 2023-02-22 PROCEDURE — 93306 TTE W/DOPPLER COMPLETE: CPT | Mod: 26 | Performed by: INTERNAL MEDICINE

## 2023-02-22 PROCEDURE — 93306 TTE W/DOPPLER COMPLETE: CPT

## 2023-02-24 ENCOUNTER — TELEPHONE (OUTPATIENT)
Dept: CARDIOLOGY | Facility: CLINIC | Age: 58
End: 2023-02-24
Payer: COMMERCIAL

## 2023-02-24 NOTE — TELEPHONE ENCOUNTER
Call routed to Janice Arredondo NP who ordered echo. Not a cardiology patient. Eva Santiago RN Cardiology February 24, 2023, 3:21 PM

## 2023-02-24 NOTE — TELEPHONE ENCOUNTER
I sent results through [employer], please notify patient:    Your ECHO looks good there is some mild/trace valve regurgitation seen in many valves but your Ejection function is normal and your ventricles are functioning normal.  You also have some mild right ventricle dilation which can sometimes be seen in hypertension but we would continue to monitor this through preventative care yearly.  I do not feel cardiology referral is needed unless you start having any chest pain or shortness of breath in which we may need to do referral and further evaluation.  I did review this with a doctor in our clinic as well.  Let me know if there are any further questions.  Janice Arredondo NP on 2/23/2023 at 12:41 PM

## 2023-02-24 NOTE — TELEPHONE ENCOUNTER
Pt has already viewed provider's ECHO result notes, writer called to confirm that he doesn't have additional questions. Pt says that he wants to know if there's any certain restrictions or precautions he needs to be aware of, based on the ECHO results. Says that he runs/bikes long distances 3-4 times a week, and would like to resume this once his back heals. Routing to provider for review.    Nely Woodard RN  [facility]

## 2023-02-24 NOTE — TELEPHONE ENCOUNTER
[facility]    Phone Message    May a detailed message be left on voicemail: yes     Reason for Call: Requesting Results   Name/type of test: Echocardiogram   Date of test: 02/22/23  Was test done at a location other than [facility] (Please fill in the location if not [facility])?: No      Action Taken: Other: Cardiology    Travel Screening: Not Applicable     Thank you!  [facility]

## 2023-02-27 NOTE — TELEPHONE ENCOUNTER
Please let him know he can do normal activity.  Follow-up and restrict if having symptoms of chest pain or shortness of breath with them for recheck.  Janice Arredondo NP on 2/27/2023 at 7:28 AM

## 2023-02-27 NOTE — TELEPHONE ENCOUNTER
Patient notified with understanding voiced.    Izabella Worley RN  [facility]

## 2023-02-28 NOTE — PROGRESS NOTES
[facility]    Outpatient Physical Therapy Discharge Note  Patient: Rangel Singh  : 1965    Beginning/End Dates of Reporting Period:  22 to 23    Referring Provider: Escobar Pittman Diagnosis: Stephan Fregoso     Client Self Report: The traction felt good for the rest of the day and then it was back to normal the next day.  Had accupunture again but it didn't do anything this time.    Objective Measurements:  Objective Measure: pain  Details: 3/10 at start of session,  0/10 during traction     Goals:  Goal Identifier 1   Goal Description Patient report pain not radiating past the knee on the right leg inorder to show centralization of pain.   Target Date 23   Date Met      Progress (detail required for progress note):       Goal Identifier 2   Goal Description Patient will be able to bend down to tie shoes with less than a 4/10 pain.   Target Date 23   Date Met      Progress (detail required for progress note):       Goal Identifier 3   Goal Description Patient will improve TREVON score to less than 45 in order to show improved pain levels and function related to his back.   Target Date 23   Date Met      Progress (detail required for progress note):       Progress towards Goals:   Progress this reporting period: All information listed above was at patient's last attended PT visit. At the last attended session, patient was showing some improvements with manual traction over the ball.  However, he left a message with therapy stating he was going to follow up with spine dr for surgery. Patient has failed to schedule f/u visits within 30 days from last visit thus is being d/c from therapy at this time. Objective measures are all taken from last visit. Current status is unknown at this time.    Plan:  Discharge from therapy.    Discharge:    Reason for Discharge:  Patient has failed to schedule further appointments.    Equipment Issued: none    Discharge Plan:  Not completed since patient failed to schedule further appointments.    Marisa Pacheco  PT, DPT       2/28/2023   [address] [address]   [email or web address]  Voicemail: [phone]

## 2023-03-23 DIAGNOSIS — E10.65 TYPE 1 DIABETES MELLITUS WITH HYPERGLYCEMIA (H): Primary | ICD-10-CM

## 2023-04-03 ENCOUNTER — OFFICE VISIT (OUTPATIENT)
Dept: ORTHOPEDICS | Facility: CLINIC | Age: 58
End: 2023-04-03
Payer: COMMERCIAL

## 2023-04-03 DIAGNOSIS — Z98.890 STATUS POST LUMBAR SPINE OPERATIVE PROCEDURE FOR DECOMPRESSION OF SPINAL CORD: Primary | ICD-10-CM

## 2023-04-03 DIAGNOSIS — M54.16 LUMBAR RADICULOPATHY: ICD-10-CM

## 2023-04-03 PROCEDURE — 99024 POSTOP FOLLOW-UP VISIT: CPT | Performed by: ORTHOPAEDIC SURGERY

## 2023-04-03 NOTE — PROGRESS NOTES
Spine Surgery Return Clinic Visit    Chief Complaint:   RECHECK (6 week POP L-5 microdiscectomy DOS 2/17/23 )    Interval HPI:     Rangel Singh is a 57 year old male who presents today for 6 week postop follow up.    He notes resolution of radicular symptoms. Denies any back pain. Notes he has been walking about 5 miles on average since surgery. Denies any bowel/bladder changes. Denies any concerns from his incision. Notes no longer utilizing gabapentin as it was not helpful. Notes use of naproxen as needed. Would like to try PT for rehabilitation.         Past Medical History:     Past Medical History:   Diagnosis Date     CELLULITIS NOS     left danny-auricular 2004     Facial cellulitis 08/11/2014     Type 1 diabetes mellitus (H)             Past Surgical History:     Past Surgical History:   Procedure Laterality Date     COLONOSCOPY  1/3/2014    Procedure: COMBINED COLONOSCOPY, SINGLE BIOPSY/POLYPECTOMY BY BIOPSY;  Colonoscopy;  Surgeon: Ed Galvez MD;  Location: [facility]     DISCECTOMY LUMBAR POSTERIOR MICROSCOPIC ONE LEVEL Right 2/17/2023    Procedure: Right Lumbar 4-5 Microdiscectomy;  Surgeon: Stephan Fregoso MD;  Location:  OR     EXAM UNDER ANESTHESIA, MANIPULATE JOINT (LOCATION) Left 3/30/2016    Procedure: EXAM UNDER ANESTHESIA, MANIPULATE JOINT (LOCATION);  Surgeon: Justen Ford MD;  Location: [facility]     SURGICAL HISTORY OF -   08/97    appendectomy ([place], [place])             Social History:     Social History     Tobacco Use     Smoking status: Never     Smokeless tobacco: Never   Vaping Use     Vaping status: Never Used   Substance Use Topics     Alcohol use: Yes     Comment: occ. 2-3 times a month            Family History:     Family History   Problem Relation Age of Onset     C.A.D. Father         50s      Hypertension Father      Atrial fibrillation Father      Hypertension Mother      Cerebrovascular Disease Mother      Atrial fibrillation Mother      Cancer Maternal  "Grandfather         ?throat/lung cancer     Cerebrovascular Disease Maternal Grandmother      Arthritis Sister         [employer] SAMSON. Brother      Atrial fibrillation Brother      Circulatory Other         2 cousins on maternal side .w anurysms     Cancer - colorectal No family hx of      Diabetes No family hx of             Allergies:     Allergies   Allergen Reactions     Nkda [No Known Drug Allergies]             Medications:     Current Outpatient Medications   Medication     ASPIRIN 81 MG OR TABS     blood glucose (NO BRAND SPECIFIED) test strip     Cholecalciferol (VITAMIN D) 50 MCG (2000 UT) CAPS     insulin glargine (LANTUS VIAL) 100 UNIT/ML vial     insulin lispro (HUMALOG) 100 UNIT/ML vial     insulin syringe-needle U-100 (BD VEO INSULIN SYRINGE U/F) 31G X 15/64\" 0.3 ML     insulin syringes, disposable, U-100 0.3 ML MISC     lisinopril (PRINIVIL/ZESTRIL) 10 MG tablet     rosuvastatin (CRESTOR) 40 MG tablet     TRUEPLUS LANCETS 33G MISC     acetaminophen (TYLENOL) 325 MG tablet     glucagon (GLUCAGON EMERGENCY) 1 MG kit     methocarbamol (ROBAXIN) 750 MG tablet     order for DME     Current Facility-Administered Medications   Medication     bupivacaine 0.5 % -  EPINEPHrine 1:200,000 injection 30 mL     dexamethasone PF (DECADRON) injection 4 mg             Physical Exam:     Vitals: There were no vitals taken for this visit.    Constitutional: Patient is healthy, well-nourished and appears stated age.    Respiratory: Patient is breathing normally and in no respiratory distress.    Skin: No suspicious rashes or lesions. Incision well healed with minimal scab    Gait: Non-antalgic gait without use of assistive devices. Able to tandem gait without difficulty.     Neurologic - Sensation intact to light touch bilaterally. Deep tendon reflexes 2+ bilateral patella.     Musculoskeletal: Strength: 5/5 bilateral Hip flexion, Knee extension, Dorsiflexion, Plantar flexion    Spine: overall good sagittal " balance  o Cervical spine:  - Normal lordosis  - Non-tender to palpation  - Full ROM without pain  - Lhermitte's Test: negative  - Spurling's Test: negative  o Thoracic Spine:  - Appearance - Normal kyphosis  - Palpation - Non-tender to palpation   o Lumbosacral Spine:  - Normal lordosis  - Non-tender to palpation, facets non-tender. SI joints non-tender.   - ROM - Full, no pain      Hips: No pain with hip log roll and no tenderness over the greater trochanters. Raymundo negative.          Imaging:   We independently reviewed and interpreted the following imaging at this clinic visit which were also reviewed with patient    Xrays Lumbar 2/17    intra op imaging reviewed for confirmation of levels.    No new imaging for this visit.     Assessment:     57 year old male with     1. 6 weeks with history of right L4-5 microdiscectomy, resolved radicular symptoms.        Plan:     1. He notes resolution of symptoms from surgery. No concerns of infection. He is currently not utilizing any narcotics, and sparinly utilizing naproxin. Educated to increase 5 IB every week. Educated to continue avoiding rough and jarring movements until 8-12 weeks from date of surgery. Plan to start PT at [facility]. Plan to follow up as needed with symptoms worsening or change. Educated that he has a 10% risk of reherniation for 10 years after recent decompression.      - virtual clinic follow up in 6 weeks, patient may cancel if doing great.    Plan formulated with Dr. Fregoso who also saw the patient and reviewed imaging. We used the patient's imaging and models to explain their pathophysiology and treatment options. All the patient's questions were answered to their satisfaction.     Thank you for allowing me to be a part of this patient's care.     Bishop DAYNA Bolton PA-C   Orthopedic Spine Surgery

## 2023-04-03 NOTE — NURSING NOTE
Reason For Visit:   Chief Complaint   Patient presents with     RECHECK     6 week POP L-5 microdiscectomy DOS 2/17/23        Primary MD: Patel Boogie  Ref. MD: Est    ?  No  Occupation retired.     Date of injury: No  Type of injury: No.     Date of surgery: 2/17/23   /Type of surgery: Right Lumbar 4-5 Microdiscectomy       Smoker: No  Request smoking cessation information: No    There were no vitals taken for this visit.    Pain Assessment  Patient Currently in Pain: Denies    Oswestry (TREVON) Questionnaire        3/27/2023    10:22 AM   OSWESTRY DISABILITY INDEX   Count 10   Sum 0   Oswestry Score (%) 0 %            Visual Analog Pain Scale  Back Pain Scale 0-10: 0  Right leg pain: 0  Left leg pain: 0  Neck Pain Scale 0-10: 0  Right arm pain: 0  Left arm pain: 0    Promis 10 Assessment        4/3/2023    11:07 AM   PROMIS 10   In general, would you say your health is: Very good   In general, would you say your quality of life is: Very good   In general, how would you rate your physical health? Very good   In general, how would you rate your mental health, including your mood and your ability to think? Very good   In general, how would you rate your satisfaction with your social activities and relationships? Very good   In general, please rate how well you carry out your usual social activities and roles Very good   To what extent are you able to carry out your everyday physical activities such as walking, climbing stairs, carrying groceries, or moving a chair? Completely   In the past 7 days, how often have you been bothered by emotional problems such as feeling anxious, depressed, or irritable? Never   In the past 7 days, how would you rate your fatigue on average? None   In the past 7 days, how would you rate your pain on average, where 0 means no pain, and 10 means worst imaginable pain? 0   In general, would you say your health is: 4   In general, would you say your quality of life is: 4   In  general, how would you rate your physical health? 4   In general, how would you rate your mental health, including your mood and your ability to think? 4   In general, how would you rate your satisfaction with your social activities and relationships? 4   In general, please rate how well you carry out your usual social activities and roles. (This includes activities at home, at work and in your community, and responsibilities as a parent, child, spouse, employee, friend, etc.) 4   To what extent are you able to carry out your everyday physical activities such as walking, climbing stairs, carrying groceries, or moving a chair? 5   In the past 7 days, how often have you been bothered by emotional problems such as feeling anxious, depressed, or irritable? 1   In the past 7 days, how would you rate your fatigue on average? 1   In the past 7 days, how would you rate your pain on average, where 0 means no pain, and 10 means worst imaginable pain? 0   Global Mental Health Score 17   Global Physical Health Score 19   PROMIS TOTAL - SUBSCORES 36                Tiki Espinoza LPN

## 2023-04-03 NOTE — LETTER
4/3/2023         RE: Rangel Singh  [id number]  [place] [address]        Dear Colleague,    Thank you for referring your patient, Rangel Singh, to the [facility]. Please see a copy of my visit note below.    Spine Surgery Return Clinic Visit    Chief Complaint:   RECHECK (6 week POP L-5 microdiscectomy DOS 2/17/23 )    Interval HPI:     Rangel Singh is a 57 year old male who presents today for 6 week postop follow up.    He notes resolution of radicular symptoms. Denies any back pain. Notes he has been walking about 5 miles on average since surgery. Denies any bowel/bladder changes. Denies any concerns from his incision. Notes no longer utilizing gabapentin as it was not helpful. Notes use of naproxen as needed. Would like to try PT for rehabilitation.         Past Medical History:     Past Medical History:   Diagnosis Date    CELLULITIS NOS     left danny-auricular 2004    Facial cellulitis 08/11/2014    Type 1 diabetes mellitus (H)             Past Surgical History:     Past Surgical History:   Procedure Laterality Date    COLONOSCOPY  1/3/2014    Procedure: COMBINED COLONOSCOPY, SINGLE BIOPSY/POLYPECTOMY BY BIOPSY;  Colonoscopy;  Surgeon: Ed Galvez MD;  Location: [facility]    DISCECTOMY LUMBAR POSTERIOR MICROSCOPIC ONE LEVEL Right 2/17/2023    Procedure: Right Lumbar 4-5 Microdiscectomy;  Surgeon: Stephan Fregoso MD;  Location:  OR    EXAM UNDER ANESTHESIA, MANIPULATE JOINT (LOCATION) Left 3/30/2016    Procedure: EXAM UNDER ANESTHESIA, MANIPULATE JOINT (LOCATION);  Surgeon: Justen Ford MD;  Location: [facility]    SURGICAL HISTORY OF -   08/97    appendectomy ([place], [place])             Social History:     Social History     Tobacco Use    Smoking status: Never    Smokeless tobacco: Never   Vaping Use    Vaping status: Never Used   Substance Use Topics    Alcohol use: Yes     Comment: occ. 2-3 times a month            Family History:     Family  "History   Problem Relation Age of Onset    SHAUNA.ANA ROSA.DA. Father         50s     Hypertension Father     Atrial fibrillation Father     Hypertension Mother     Cerebrovascular Disease Mother     Atrial fibrillation Mother     Cancer Maternal Grandfather         ?throat/lung cancer    Cerebrovascular Disease Maternal Grandmother     Arthritis Sister         RA    MALI. Brother     Atrial fibrillation Brother     Circulatory Other         2 cousins on maternal side .w anurysms    Cancer - colorectal No family hx of     Diabetes No family hx of             Allergies:     Allergies   Allergen Reactions    Nkda [No Known Drug Allergies]             Medications:     Current Outpatient Medications   Medication    ASPIRIN 81 MG OR TABS    blood glucose (NO BRAND SPECIFIED) test strip    Cholecalciferol (VITAMIN D) 50 MCG (2000 UT) CAPS    insulin glargine (LANTUS VIAL) 100 UNIT/ML vial    insulin lispro (HUMALOG) 100 UNIT/ML vial    insulin syringe-needle U-100 (BD VEO INSULIN SYRINGE U/F) 31G X 15/64\" 0.3 ML    insulin syringes, disposable, U-100 0.3 ML MISC    lisinopril (PRINIVIL/ZESTRIL) 10 MG tablet    rosuvastatin (CRESTOR) 40 MG tablet    TRUEPLUS LANCETS 33G MISC    acetaminophen (TYLENOL) 325 MG tablet    glucagon (GLUCAGON EMERGENCY) 1 MG kit    methocarbamol (ROBAXIN) 750 MG tablet    order for DME     Current Facility-Administered Medications   Medication    bupivacaine 0.5 % -  EPINEPHrine 1:200,000 injection 30 mL    dexamethasone PF (DECADRON) injection 4 mg             Physical Exam:   Vitals: There were no vitals taken for this visit.  Constitutional: Patient is healthy, well-nourished and appears stated age.  Respiratory: Patient is breathing normally and in no respiratory distress.  Skin: No suspicious rashes or lesions. Incision well healed with minimal scab  Gait: Non-antalgic gait without use of assistive devices. Able to tandem gait without difficulty.   Neurologic - Sensation intact to light touch " bilaterally. Deep tendon reflexes 2+ bilateral patella.   Musculoskeletal: Strength: 5/5 bilateral Hip flexion, Knee extension, Dorsiflexion, Plantar flexion  Spine: overall good sagittal balance  Cervical spine:  Normal lordosis  Non-tender to palpation  Full ROM without pain  Lhermitte's Test: negative  Spurling's Test: negative  Thoracic Spine:  Appearance - Normal kyphosis  Palpation - Non-tender to palpation   Lumbosacral Spine:  Normal lordosis  Non-tender to palpation, facets non-tender. SI joints non-tender.   ROM - Full, no pain    Hips: No pain with hip log roll and no tenderness over the greater trochanters. Raymundo negative.          Imaging:   We independently reviewed and interpreted the following imaging at this clinic visit which were also reviewed with patient    Xrays Lumbar 2/17    intra op imaging reviewed for confirmation of levels.    No new imaging for this visit.     Assessment:     57 year old male with     1. 6 weeks with history of right L4-5 microdiscectomy, resolved radicular symptoms.        Plan:     He notes resolution of symptoms from surgery. No concerns of infection. He is currently not utilizing any narcotics, and sparinly utilizing naproxin. Educated to increase 5 IB every week. Educated to continue avoiding rough and jarring movements until 8-12 weeks from date of surgery. Plan to start PT at [facility]. Plan to follow up as needed with symptoms worsening or change. Educated that he has a 10% risk of reherniation for 10 years after recent decompression.      - virtual clinic follow up in 6 weeks, patient may cancel if doing great.    Plan formulated with Dr. Fregoso who also saw the patient and reviewed imaging. We used the patient's imaging and models to explain their pathophysiology and treatment options. All the patient's questions were answered to their satisfaction.     Thank you for allowing me to be a part of this patient's care.     Bishop DAYNA Bolton PA-C    Orthopedic Spine Surgery      Attestation signed by Stephan Fregoso MD at 4/3/2023  1:07 PM:  I reviewed the patient's history, physical examination and imaging studies with the PA. In addition I individually examined the patient and developed the treatment plan.  I agree with the assessment and plan as documented.     Stephan Fregoso MD  Orthopedic Spine Surgeon  , Department of Orthopedic Surgery

## 2023-04-04 ENCOUNTER — HOSPITAL ENCOUNTER (OUTPATIENT)
Dept: PHYSICAL THERAPY | Facility: CLINIC | Age: 58
Setting detail: THERAPIES SERIES
Discharge: HOME | End: 2023-04-04
Attending: PHYSICIAN ASSISTANT
Payer: COMMERCIAL

## 2023-04-04 DIAGNOSIS — M54.16 LUMBAR RADICULOPATHY: ICD-10-CM

## 2023-04-04 PROCEDURE — 97110 THERAPEUTIC EXERCISES: CPT | Mod: GP | Performed by: PHYSICAL THERAPIST

## 2023-04-04 PROCEDURE — 97161 PT EVAL LOW COMPLEX 20 MIN: CPT | Mod: GP | Performed by: PHYSICAL THERAPIST

## 2023-04-04 NOTE — PROGRESS NOTES
04/04/23 0800   General Information   Type of Visit Initial OP Ortho PT Evaluation   Start of Care Date 04/04/23   Referring Physician Bishop Bolton   Patient/Family Goals Statement improve core strength, flexibliity, get back to marathons   Orders Evaluate and Treat   Date of Order 04/03/23   Medical Diagnosis Lumbar radiculopathy (M54.16)   Surgical/Medical history reviewed Yes   Precautions/Limitations no known precautions/limitations   General Information Comments PMH: L4-5 lumbar microdisectomy   Body Part(s)   Body Part(s) Lumbar Spine/SI   Presentation and Etiology   Pertinent history of current problem (include personal factors and/or comorbidities that impact the POC) Patient had recent L4-5 lumbar microdisectomy on 2/17 and the pain is complety resolved. Patient is looking today for guidance on core exercises. Would eventually love to get back to running and doing marathons again.   Impairments F. Decreased strength and endurance   Symptom Location low back, core   How/Where did it occur Other  (surgery)   Onset date of current episode/exacerbation 02/17/23   Chronicity New   Pain rating (0-10 point scale) Denies pain   Progression of symptoms since onset: Improved   Current Level of Function   Patient role/employment history F. Retired   Fall Risk Screen   Fall screen completed by PT   Have you fallen 2 or more times in the past year? No   Have you fallen and had an injury in the past year? No   Is patient a fall risk? No   Abuse Screen (yes response referral indicated)   Feels Unsafe at Home or Work/School no   Feels Threatened by Someone no   Does Anyone Try to Keep You From Having Contact with Others or Doing Things Outside Your Home? no   Physical Signs of Abuse Present no   Lumbar Spine/SI Objective Findings   Gait/Locomotion normal heel/toe gait pattern, non antalgic   Balance/Proprioception (Single Leg Stance) 10 sec B   Flexion ROM finger tips 6inches from floor, pulling in HS   Right Side  Bending ROM finger tips 4 inches from knee joint line   Left Side Bending ROM finger tips 4 inches from knee joint line   Hip Screen hip flexion: right 120  L 115,  Hip ER  right 20 degrees  L 20 degrees,   left ER 20 IR 20   Hip Flexion (L2) Strength right 4+/5,  left 5/5   Knee Flexion Strength 5-/5 B   Knee Extension (L3) Strength 5/5 B   Hamstring Flexibility 90/90: 30 from 0 B   Hip Flexor Flexibility mild tightness   Transversus Abdominus Strength (Gume Leg Lowering-deg) 40 degrees from 0   Hip Abduction Strength 5-/5 B   Hip Adduction Strength 5/5 B   Hip Extension Strength 4+/5 B   Quadricep Flexibility left > right mild tightness   Piriformis Flexibility mod tightness B   Justen Test mild tightness hip flexors B   Planned Therapy Interventions   Planned Therapy Interventions joint mobilization;manual therapy;neuromuscular re-education;ROM;strengthening;stretching;transfer training;balance training;bed mobility training;gait training   Clinical Impression   Criteria for Skilled Therapeutic Interventions Met yes, treatment indicated   PT Diagnosis decreased core and LE strength   Influenced by the following impairments decreased core and LE strength, muscle tightness B LE   Functional limitations due to impairments running, lifting, working out, squating, lunging   Clinical Presentation Stable/Uncomplicated   Clinical Presentation Rationale clinical decision making and chart review   Clinical Decision Making (Complexity) Low complexity   Therapy Frequency other (see comments)  (every 2-3 weeks)   Predicted Duration of Therapy Intervention (days/wks) 12 weeks   Risk & Benefits of therapy have been explained Yes   Patient, Family & other staff in agreement with plan of care Yes   Clinical Impression Comments Escobar is a 57 year old male who presents to PT s/p lumbar L4-5 microdisectomy from 2/17. Patient returns to PT with no pain but hopes to get back to his prior strength and improve core strength. PT  prognosis is excellent to reach stated goals due to great patient motivation and lack of pain.   Education Assessment   Preferred Learning Style Listening;Pictures/video;Demonstration   Barriers to Learning No barriers   ORTHO GOALS   PT Ortho Eval Goals 1;2;3;4   Ortho Goal 1   Goal Identifier 1   Goal Description Patient will improve B hip abd strength to 5/5 in order to improve hip and lumbar stabilization for when patient wants to return to running.   Target Date 05/02/23   Ortho Goal 2   Goal Identifier 2   Goal Description Patient will be able to complete Gume leg lowering test to 20 degrees from 0 in order to show improved core strength for low back stabilization.   Target Date 05/16/23   Ortho Goal 3   Goal Identifier 3   Goal Description Patient will improve B hip extension strength to 5/5 in order to decrease overuse of low back muscle with squatting, pushing, pulling.   Target Date 06/27/23   Ortho Goal 4   Goal Identifier 4   Goal Description Patient will be independent in HEP in order to continue progressing strength and function outside of PT.   Target Date 06/27/23   Total Evaluation Time   PT Eval, Low Complexity Minutes (14764) 15     Marisa Pacheco  PT, DPT       4/4/2023   [address] [address]   [email or web address]  Voicemail: [phone]

## 2023-04-06 ENCOUNTER — LAB (OUTPATIENT)
Dept: LAB | Facility: CLINIC | Age: 58
End: 2023-04-06
Payer: COMMERCIAL

## 2023-04-06 DIAGNOSIS — E10.65 TYPE 1 DIABETES MELLITUS WITH HYPERGLYCEMIA (H): ICD-10-CM

## 2023-04-06 LAB
CHOLEST SERPL-MCNC: 172 MG/DL
HBA1C MFR BLD: 6 % (ref 0–5.6)
HDLC SERPL-MCNC: 53 MG/DL
LDLC SERPL CALC-MCNC: 111 MG/DL
NONHDLC SERPL-MCNC: 119 MG/DL
TRIGL SERPL-MCNC: 42 MG/DL

## 2023-04-06 PROCEDURE — 83036 HEMOGLOBIN GLYCOSYLATED A1C: CPT

## 2023-04-06 PROCEDURE — 80061 LIPID PANEL: CPT

## 2023-04-06 PROCEDURE — 36415 COLL VENOUS BLD VENIPUNCTURE: CPT

## 2023-04-11 ENCOUNTER — TELEPHONE (OUTPATIENT)
Dept: FAMILY MEDICINE | Facility: CLINIC | Age: 58
End: 2023-04-11
Payer: COMMERCIAL

## 2023-04-11 NOTE — TELEPHONE ENCOUNTER
Patient Quality Outreach    Patient is due for the following:   Diabetes -  BP Check    Next Steps:   Schedule a nurse only visit for bp recheck    Type of outreach:    Phone, spoke to patient/parent. RN scheduled for pt 4/18/23 at [place]      Questions for provider review:    None     Lupe Marks, [facility]

## 2023-04-18 ENCOUNTER — HOSPITAL ENCOUNTER (OUTPATIENT)
Dept: PHYSICAL THERAPY | Facility: CLINIC | Age: 58
Setting detail: THERAPIES SERIES
Discharge: HOME | End: 2023-04-18
Attending: PHYSICIAN ASSISTANT
Payer: COMMERCIAL

## 2023-04-18 ENCOUNTER — ALLIED HEALTH/NURSE VISIT (OUTPATIENT)
Dept: FAMILY MEDICINE | Facility: CLINIC | Age: 58
End: 2023-04-18
Payer: COMMERCIAL

## 2023-04-18 DIAGNOSIS — Z01.30 BLOOD PRESSURE CHECK: Primary | ICD-10-CM

## 2023-04-18 PROCEDURE — 97110 THERAPEUTIC EXERCISES: CPT | Mod: GP | Performed by: PHYSICAL THERAPIST

## 2023-04-18 PROCEDURE — 99207 PR NO CHARGE NURSE ONLY: CPT

## 2023-04-18 NOTE — PROGRESS NOTES
Rangel Singh is a 57 year old year old patient who comes in today for a Blood Pressure check because of ongoing blood pressure monitoring.    Vital Signs as repeated by /60       Pt was seen sitting in chair

## 2023-04-18 NOTE — PROGRESS NOTES
[facility]    Outpatient Physical Therapy Discharge Note  Patient: Rangel Singh  : 1965    Beginning/End Dates of Reporting Period:  23/ to 23     Referring Provider: Bishop Hoang Pittman Diagnosis: decreased core and LE strength      Client Self Report: No pain still.   Feeling stronger with doing his exercises.  Back to running 2 miles and walks 1 mile.  Doing some weight lifting at home.    Objective Measurements:  Objective Measure: TrA  Details: able to keep core engaged for double to tap knees bent  Objective Measure: strength  Details: right hip flexion 5-/5,  hip abd 5-/5 B, hip ext 5-/5 B    Goals:  Goal Identifier 1   Goal Description Patient will improve B hip abd strength to 5/5 in order to improve hip and lumbar stabilization for when patient wants to return to running.   Target Date 23   Date Met      Progress (detail required for progress note): progressing, strength up to 4+/5 B   improving but not met, pt will keep progressing strength with HEP     Goal Identifier 2   Goal Description Patient will be able to complete Gume leg lowering test to 20 degrees from 0 in order to show improved core strength for low back stabilization.   Target Date 23   Date Met      Progress (detail required for progress note): progressing, able to complete double toe tap with keeping TrA engaged,  goal improving but not met, pt will keep progressing strength with HEP     Goal Identifier 3   Goal Description Patient will improve B hip extension strength to 5/5 in order to decrease overuse of low back muscle with squatting, pushing, pulling.   Target Date 23   Date Met      Progress (detail required for progress note): improving but not met, pt will keep progressing strength with HEP     Goal Identifier 4   Goal Description Patient will be independent in HEP in order to  continue progressing strength and function outside of PT.   Target Date 06/27/23   Date Met  04/18/23   Progress (detail required for progress note):       Plan:  Discharge from therapy. Escobar has a great understanding of his current HEP and how to make appropriate progressions to get back to his previous level of strength and function. He has been able to return to running a few miles and continues to have no pain in his back.  He has the exercises to continue to progress his strength at home and continue to progress towards therapy goals. Due to neel having goo dunderstanding of exercises and a history of exercising in the past, no further skilled intervention is needed.     Discharge:    Reason for Discharge: Patient chooses to discontinue therapy.  Patient is independent in self management and knows how to progress ex to reach goals independently     Equipment Issued: therabantc    Discharge Plan: Patient to continue home program.    Marisa Pacheco  PT, DPT       4/18/2023   [address] [address]   [email or web address]  Voicemail: [phone]

## 2023-06-13 ENCOUNTER — TELEPHONE (OUTPATIENT)
Dept: FAMILY MEDICINE | Facility: CLINIC | Age: 58
End: 2023-06-13
Payer: COMMERCIAL

## 2023-06-13 VITALS — SYSTOLIC BLOOD PRESSURE: 126 MMHG | DIASTOLIC BLOOD PRESSURE: 60 MMHG

## 2023-06-13 NOTE — TELEPHONE ENCOUNTER
Patient Quality Outreach    Patient is due for the following:   Diabetes -  BP Check    Next Steps:   Chart reviewed, rn bp check done 4/18/23, was not recorded in the vital section, added the bp reading to the vitals section of the visit, will be a pass.    Type of outreach:    Chart review performed, no outreach needed.      Questions for provider review:    None           Lupe Marks, CMA

## 2023-09-20 NOTE — PROGRESS NOTES
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  Since that time has been experiencing right buttock with radiation down lateral calf consistent with L5 distribution, numbness on bottom of foot.  He reports sitting is excrucitating, bending forward exacerbates his symptoms.  Symptoms are alleviated with short walking and moving.  He has had leg buckling when going up or down stairs.  Has been in physical therapy but based on his description the exercises provided were quite strenuous and focus more towards core strengthening and exacerbated his symptoms.  No bowel or bladder dysfunction and no saddle anesthesia    Treatments thus far:  Prescription NSAID, tizanidine neither of which have helped much, lidocaine patches  Physical therapy which exacerbate symptoms  Has not had gabapentin or Neurontin  Has not had injections    PMH  Diabetes Mellitus type I (A1c runs 5.5 to 6)    Objective:  Examination is alert and oriented, no acute distress but has difficulty sitting and needs to keep pacing due to severity of symptoms  Is able to walk without assistive device but clearly has antalgic gait.  He is able to walk on tiptoes and heels.  Resisted motor strength testing reveals 5 -/5 pedal's anterior in the right otherwise he is 5/5 throughout the lower extremities.  Sensation subjectively decreased S1 distribution.  Otherwise sensation is intact to gliding light touch bilaterally.  Deferred straight leg testing giving is obvious tension signs.  Reflexes are maintained    Imaging review:  AP lateral flexion-extension views of the lumbar spine demonstrates grade 1 anterolisthesis of L3 on 4 and a corresponding retrolisthesis of L4 on 5.  There is no evidence of dynamic instability however it looks like his flexion extension was somewhat guarded by symptoms so may not be optimal  comparison    MRI lumbar spine which was recently obtained demonstrates a very large right paracentral disc herniation impinging upon the traversing L5 nerve root and contacting the traversing S1 nerve root in the lateral recess.  There is also broad-based disc bulge at L3-4 with moderate stenosis contacting the exiting nerve root.    Impression and plan: 57-year-old male with very large right paracentral disc herniation presenting with subacute right L5 and S1 radiculopathy.     We discussed the options for treatment recommending continued nonoperative measures.  This includes directed physical therapy aimed at core stabilization, strengthening and stretching.  We also discussed the safe use of over-the-counter nonsteroidal anti-inflammatory drugs and the contraindications for these medications.  We reviewed the option of gabapentin which although it is not indicated for all is often helpful in the setting of symptoms associated with neural element compression.  We also reviewed the option of image guided injections which have both therapeutic and diagnostic benefit.  While injections are not a cure for degenerative spinal conditions they can provide several weeks to months of symptomatic relief and can also be used in differential manner to better identify the pathologic cause of symptoms.  When these measures fail and the symptoms are severe enough that the patient is no longer able to perform the level they are comfortable with discussion of surgery becomes a reasonable option.  Placed referral for epidural steroid injection at soonest available appointment.  I do know that there are cc backlog of several weeks for injections within [facility] system if this is the case I will refer him to outside providers to see if we do the injection sooner given severity of his symptoms.  We are not at that point at this time and I recommended discontinued nonoperative measures and will follow up in 6 weeks.    Time spent on  this clinical encounter including previsit chart review, history and physical examination, patient counseling and documentation was 45 minutes on the date of encounter.      Stephan Fregoso MD  Orthopedic Spine Surgeon  , Department of Orthopedic Surgery      Answers for HPI/ROS submitted by the patient on 11/27/2022  General Symptoms: No  Skin Symptoms: No  HENT Symptoms: No  EYE SYMPTOMS: No  HEART SYMPTOMS: No  LUNG SYMPTOMS: No  INTESTINAL SYMPTOMS: No  URINARY SYMPTOMS: No  REPRODUCTIVE SYMPTOMS: No  SKELETAL SYMPTOMS: Yes  BLOOD SYMPTOMS: No  NERVOUS SYSTEM SYMPTOMS: Yes  MENTAL HEALTH SYMPTOMS: No  Back pain: Yes  Muscle aches: Yes  Joint pain: Yes  Muscle cramps: Yes  Muscle weakness: Yes  Joint stiffness: Yes  Trouble with coordination: Yes  Dizziness or trouble with balance: Yes  Fainting or black-out spells: No  Memory loss: No  Headache: No  Seizures: No  Speech problems: No  Tingling: Yes  Tremor: No  Weakness: Yes  Difficulty walking: Yes  Paralysis: No  Numbness: Yes       What Type Of Note Output Would You Prefer (Optional)?: Standard Output Hpi Title: Evaluation of Skin Lesions How Severe Are Your Spot(S)?: mild Have Your Spot(S) Been Treated In The Past?: has not been treated

## 2023-10-18 ENCOUNTER — DOCUMENTATION ONLY (OUTPATIENT)
Dept: FAMILY MEDICINE | Facility: CLINIC | Age: 58
End: 2023-10-18
Payer: COMMERCIAL

## 2023-10-18 DIAGNOSIS — E10.3293 TYPE 1 DIABETES MELLITUS WITH MILD NONPROLIFERATIVE RETINOPATHY OF BOTH EYES WITHOUT MACULAR EDEMA (H): Primary | Chronic | ICD-10-CM

## 2023-10-18 DIAGNOSIS — R97.20 ELEVATED PROSTATE SPECIFIC ANTIGEN (PSA): ICD-10-CM

## 2023-10-18 NOTE — PROGRESS NOTES
Rangel Singh has an upcoming lab appointment:    Future Appointments   Date Time Provider Department Center   10/23/2023  7:30 AM [facility]   10/30/2023  7:00 AM Juan Allen, DO [facility]     Patient is scheduled for the following lab(s): labs pre-physical fasting labs include PSA, A1C, lipid panel    There is no order available. Please review and place either future orders or HMPO (Review of Health Maintenance Protocol Orders), as appropriate.    Health Maintenance Due   Topic    A1C     ANNUAL REVIEW OF HM ORDERS      Katherine Schoenhals

## 2023-10-23 ENCOUNTER — LAB (OUTPATIENT)
Dept: LAB | Facility: CLINIC | Age: 58
End: 2023-10-23
Payer: COMMERCIAL

## 2023-10-23 DIAGNOSIS — E10.3293 TYPE 1 DIABETES MELLITUS WITH MILD NONPROLIFERATIVE RETINOPATHY OF BOTH EYES WITHOUT MACULAR EDEMA (H): Chronic | ICD-10-CM

## 2023-10-23 DIAGNOSIS — R97.20 ELEVATED PROSTATE SPECIFIC ANTIGEN (PSA): ICD-10-CM

## 2023-10-23 LAB
ANION GAP SERPL CALCULATED.3IONS-SCNC: 8 MMOL/L (ref 7–15)
BUN SERPL-MCNC: 20.7 MG/DL (ref 8–23)
CALCIUM SERPL-MCNC: 9.5 MG/DL (ref 8.8–10.2)
CHLORIDE SERPL-SCNC: 104 MMOL/L (ref 98–107)
CREAT SERPL-MCNC: 0.79 MG/DL (ref 0.67–1.17)
DEPRECATED HCO3 PLAS-SCNC: 28 MMOL/L (ref 22–29)
EGFRCR SERPLBLD CKD-EPI 2021: >90 ML/MIN/1.73M2
GLUCOSE SERPL-MCNC: 118 MG/DL (ref 70–99)
HBA1C MFR BLD: 6.1 % (ref 0–5.6)
POTASSIUM SERPL-SCNC: 4.8 MMOL/L (ref 3.4–5.3)
PSA SERPL DL<=0.01 NG/ML-MCNC: 3.61 NG/ML (ref 0–4.5)
SODIUM SERPL-SCNC: 140 MMOL/L (ref 135–145)

## 2023-10-23 PROCEDURE — 80048 BASIC METABOLIC PNL TOTAL CA: CPT

## 2023-10-23 PROCEDURE — 36415 COLL VENOUS BLD VENIPUNCTURE: CPT

## 2023-10-23 PROCEDURE — 84153 ASSAY OF PSA TOTAL: CPT

## 2023-10-23 PROCEDURE — 83036 HEMOGLOBIN GLYCOSYLATED A1C: CPT

## 2023-10-29 ASSESSMENT — ENCOUNTER SYMPTOMS
EYE PAIN: 0
JOINT SWELLING: 0
DYSURIA: 0
ARTHRALGIAS: 1
SORE THROAT: 0
CHILLS: 0
WEAKNESS: 0
CONSTIPATION: 0
HEARTBURN: 0
HEMATOCHEZIA: 0
DIZZINESS: 0
DIARRHEA: 0
FREQUENCY: 0
PALPITATIONS: 0
MYALGIAS: 0
NERVOUS/ANXIOUS: 0
HEADACHES: 0
SHORTNESS OF BREATH: 0
NAUSEA: 0
PARESTHESIAS: 0
HEMATURIA: 0
COUGH: 0
ABDOMINAL PAIN: 0
FEVER: 0

## 2023-10-30 ENCOUNTER — OFFICE VISIT (OUTPATIENT)
Dept: FAMILY MEDICINE | Facility: CLINIC | Age: 58
End: 2023-10-30
Payer: COMMERCIAL

## 2023-10-30 ENCOUNTER — HOSPITAL ENCOUNTER (OUTPATIENT)
Dept: GENERAL RADIOLOGY | Facility: CLINIC | Age: 58
Discharge: HOME | End: 2023-10-30
Attending: FAMILY MEDICINE | Admitting: FAMILY MEDICINE
Payer: COMMERCIAL

## 2023-10-30 VITALS
SYSTOLIC BLOOD PRESSURE: 130 MMHG | HEIGHT: 71 IN | DIASTOLIC BLOOD PRESSURE: 70 MMHG | BODY MASS INDEX: 26.04 KG/M2 | WEIGHT: 186 LBS | RESPIRATION RATE: 20 BRPM | TEMPERATURE: 97 F | HEART RATE: 69 BPM | OXYGEN SATURATION: 99 %

## 2023-10-30 DIAGNOSIS — Z12.11 COLON CANCER SCREENING: ICD-10-CM

## 2023-10-30 DIAGNOSIS — E10.9 TYPE 1 DIABETES MELLITUS WITHOUT COMPLICATION (H): ICD-10-CM

## 2023-10-30 DIAGNOSIS — M25.551 HIP PAIN, RIGHT: ICD-10-CM

## 2023-10-30 DIAGNOSIS — Z00.00 ROUTINE GENERAL MEDICAL EXAMINATION AT A HEALTH CARE FACILITY: Primary | ICD-10-CM

## 2023-10-30 PROCEDURE — 99396 PREV VISIT EST AGE 40-64: CPT | Mod: 25 | Performed by: FAMILY MEDICINE

## 2023-10-30 PROCEDURE — 73502 X-RAY EXAM HIP UNI 2-3 VIEWS: CPT

## 2023-10-30 PROCEDURE — 90678 RSV VACC PREF BIVALENT IM: CPT | Performed by: FAMILY MEDICINE

## 2023-10-30 PROCEDURE — 90471 IMMUNIZATION ADMIN: CPT | Performed by: FAMILY MEDICINE

## 2023-10-30 PROCEDURE — 99213 OFFICE O/P EST LOW 20 MIN: CPT | Mod: 25 | Performed by: FAMILY MEDICINE

## 2023-10-30 RX ORDER — EZETIMIBE 10 MG/1
1 TABLET ORAL EVERY MORNING
COMMUNITY
Start: 2023-04-13 | End: 2024-04-12

## 2023-10-30 ASSESSMENT — ENCOUNTER SYMPTOMS
DIARRHEA: 0
JOINT SWELLING: 0
HEMATURIA: 0
CONSTIPATION: 0
COUGH: 0
NAUSEA: 0
CHILLS: 0
FEVER: 0
WEAKNESS: 0
FREQUENCY: 0
SORE THROAT: 0
ARTHRALGIAS: 1
PARESTHESIAS: 0
HEARTBURN: 0
DYSURIA: 0
HEMATOCHEZIA: 0
EYE PAIN: 0
HEADACHES: 0
DIZZINESS: 0
NERVOUS/ANXIOUS: 0
ABDOMINAL PAIN: 0
SHORTNESS OF BREATH: 0
PALPITATIONS: 0
MYALGIAS: 0

## 2023-10-30 ASSESSMENT — PAIN SCALES - GENERAL: PAINLEVEL: MILD PAIN (2)

## 2023-10-30 NOTE — PROGRESS NOTES
SUBJECTIVE:   CC: Escobar is an 58 year old who presents for preventative health visit.       10/30/2023     6:47 AM   Additional Questions   Roomed by Vibha HANEY       Healthy Habits:     Getting at least 3 servings of Calcium per day:  Yes    Bi-annual eye exam:  Yes    Dental care twice a year:  Yes    Sleep apnea or symptoms of sleep apnea:  None    Diet:  Regular (no restrictions)    Frequency of exercise:  6-7 days/week    Duration of exercise:  15-30 minutes    Taking medications regularly:  Yes    Medication side effects:  None    Additional concerns today:  No    58 year old male who presents to clinic for annual exam.   [facility]       Immunizations: RSV vaccine today.   Cholesterol: recently screen  Aspirin: 81 mg daily   Diabetes: A1c of 6.1 on 10/23/2023  Colon Cancer: Colonoscopy 1/3/2014 repeat 10 years.  Prostate: PSA on 3.61   Diet/Exercise: active   Tobacco: non-user.       DMT1  Has been on insulin since year 2000   Follows endocrinology with [facility]   Last A1c of 6.1  [facility].         Right hip   Has ran multiple marathons (21 total) in the past 15 years.   Feels like pain is in the joint  Does not bother with standing or working around the house.   Able to do leg workouts, lunges without any issues.   Will get discomfort with sitting on a soft couch. Can get up and then the pain goes away.   Has some occasional pain over the right lower leg. He states this feels different than when he had his back issues.   No weakness in the leg.   Occasional some discomfort/numbness on the calf.   Had a Right L4-L5 microdiscectomy for decompression of the L4 and L5 nerve roots on 2/2023      Have you ever done Advance Care Planning? (For example, a Health Directive, POLST, or a discussion with a medical provider or your loved ones about your wishes): No, advance care planning information given to patient to review.  Advanced care planning was  "discussed at today's visit.    Social History     Tobacco Use    Smoking status: Never    Smokeless tobacco: Never   Substance Use Topics    Alcohol use: Yes     Comment: occ. 2-3 times a month             10/29/2023     7:46 PM   Alcohol Use   Prescreen: >3 drinks/day or >7 drinks/week? No       Last PSA:   PSA   Date Value Ref Range Status   06/14/2021 3.09 0 - 4 ug/L Final     Comment:     Assay Method:  Chemiluminescence using Siemens Vista analyzer     PSA Tumor Marker   Date Value Ref Range Status   10/23/2023 3.61 0.00 - 4.50 ng/mL Final       Reviewed orders with patient. Reviewed health maintenance and updated orders accordingly - Yes      Reviewed and updated as needed this visit by clinical staff   Tobacco  Allergies  Meds  Problems  Med Hx  Surg Hx  Fam Hx          Reviewed and updated as needed this visit by Provider                   Review of Systems   Constitutional:  Negative for chills and fever.   HENT:  Negative for congestion, ear pain, hearing loss and sore throat.    Eyes:  Negative for pain and visual disturbance.   Respiratory:  Negative for cough and shortness of breath.    Cardiovascular:  Negative for chest pain, palpitations and peripheral edema.   Gastrointestinal:  Negative for abdominal pain, constipation, diarrhea, heartburn, hematochezia and nausea.   Genitourinary:  Negative for dysuria, frequency, genital sores, hematuria, impotence, penile discharge and urgency.   Musculoskeletal:  Positive for arthralgias. Negative for joint swelling and myalgias.   Skin:  Negative for rash.   Neurological:  Negative for dizziness, weakness, headaches and paresthesias.   Psychiatric/Behavioral:  Negative for mood changes. The patient is not nervous/anxious.        OBJECTIVE:   /70 (BP Location: Right arm, Patient Position: Chair, Cuff Size: Adult Regular)   Pulse 69   Temp 97  F (36.1  C) (Tympanic)   Resp 20   Ht 1.795 m (5' 10.67\")   Wt 84.4 kg (186 lb)   SpO2 99%   BMI " 26.19 kg/m      Physical Exam  GENERAL: healthy, alert and no distress  EYES: Eyes grossly normal to inspection, PERRL and conjunctivae and sclerae normal  HENT: ear canals and TM's normal, nose and mouth without ulcers or lesions  NECK: no adenopathy, no asymmetry, masses, or scars and thyroid normal to palpation  RESP: lungs clear to auscultation - no rales, rhonchi or wheezes  CV: regular rate and rhythm, normal S1 S2, no S3 or S4, no murmur, click or rub, no peripheral edema and peripheral pulses strong  ABDOMEN: soft, nontender, no hepatosplenomegaly, no masses and bowel sounds normal  MS: no gross musculoskeletal defects noted, no edema  SKIN: no suspicious lesions or rashes  NEURO: Normal strength and tone, mentation intact and speech normal  PSYCH: mentation appears normal, affect normal/bright  Back: Nontender over thoracic and lumbar spine.  Nontender over SI joints.  Nontender over the lateral hips.  MSK hip right: Hip range of motion full.  Pain with hip flexion.  Strength full.  Sensation intact.  Logroll test negative.  Pain with hip and knee flexed at 90 degrees of internal rotation.  Pain with stretching piriformis muscle.      ASSESSMENT/PLAN:   Ray was seen today for physical and hip pain.    Diagnoses and all orders for this visit:    Routine general medical examination at a health care facility  Immunizations: RSV vaccine today.   Cholesterol: recently screen  Aspirin: 81 mg daily   Diabetes: A1c of 6.1 on 10/23/2023  Colon Cancer: Colonoscopy 1/3/2014 repeat 10 years.  Prostate: PSA on 3.61   Diet/Exercise: active   Tobacco: non-user.     Colon cancer screening  -     Colonoscopy Screening  Referral; Future    Type 1 diabetes mellitus without complication (H)  Follows with Endocrinology [facility]. A1c well controlled.     Hip pain, right  No specific trauma or injury.  Pain is only present with sitting on soft couches.  Able to walk and workout without any issues.  He has been quite  "active in his life and has ran 21 marathons in the last 15 years.  Will obtain x-ray of the right hip to rule out osteoarthritis.  Exam is consistent with piriformis syndrome.  Discussed neck steps regarding physical therapy versus meeting with sports med/orthopedics.  Patient wishes to meet with sports med/orthopedics.  -     XR Hip Right 2-3 Views; Future  -     Orthopedic  Referral; Future    Other orders  -     RSV VACCINE (ABRYSVO)  -     PRIMARY CARE FOLLOW-UP SCHEDULING; Future    The risks, benefits and treatment options of prescribed medications or other treatments have been discussed with the patient. The patient verbalized their understanding and should call or follow up if no improvement or if they develop further problems.        Patient has been advised of split billing requirements and indicates understanding: Yes      COUNSELING:   Reviewed preventive health counseling, as reflected in patient instructions       Regular exercise       Healthy diet/nutrition       Colorectal cancer screening      BMI:   Estimated body mass index is 26.19 kg/m  as calculated from the following:    Height as of this encounter: 1.795 m (5' 10.67\").    Weight as of this encounter: 84.4 kg (186 lb).   Weight management plan: Discussed healthy diet and exercise guidelines      He reports that he has never smoked. He has never used smokeless tobacco.            Juan Allen,   [facility]  "

## 2023-10-30 NOTE — NURSING NOTE
Prior to immunization administration, verified patients identity using patient s name and date of birth. Please see Immunization Activity for additional information.     Screening Questionnaire for Adult Immunization    Are you sick today?   No   Do you have allergies to medications, food, a vaccine component or latex?   No   Have you ever had a serious reaction after receiving a vaccination?   No   Do you have a long-term health problem with heart, lung, kidney, or metabolic disease (e.g., diabetes), asthma, a blood disorder, no spleen, complement component deficiency, a cochlear implant, or a spinal fluid leak?  Are you on long-term aspirin therapy?   Yes   Do you have cancer, leukemia, HIV/AIDS, or any other immune system problem?   Yes   Do you have a parent, brother, or sister with an immune system problem?   No   In the past 3 months, have you taken medications that affect  your immune system, such as prednisone, other steroids, or anticancer drugs; drugs for the treatment of rheumatoid arthritis, Crohn s disease, or psoriasis; or have you had radiation treatments?   No   Have you had a seizure, or a brain or other nervous system problem?   No   During the past year, have you received a transfusion of blood or blood    products, or been given immune (gamma) globulin or antiviral drug?   No   For women: Are you pregnant or is there a chance you could become       pregnant during the next month?   No   Have you received any vaccinations in the past 4 weeks?   Yes     Immunization questionnaire was positive for at least one answer.  Notified .      Patient instructed to remain in clinic for 15 minutes afterwards, and to report any adverse reactions.     Screening performed by Vibha Burleson CMA on 10/30/2023 at 7:39 AM.

## 2023-10-30 NOTE — PATIENT INSTRUCTIONS
Xray of hip today.     Follow up with sports medicine/ orthopedics for the hip.       Preventive Health Recommendations  Male Ages 50 - 64    Yearly exam:             See your health care provider every year in order to  o   Review health changes.   o   Discuss preventive care.    o   Review your medicines if your doctor has prescribed any.   Have a cholesterol test every 5 years, or more frequently if you are at risk for high cholesterol/heart disease.   Have a diabetes test (fasting glucose) every three years. If you are at risk for diabetes, you should have this test more often.   Have a colonoscopy at age 45, or have a yearly FIT test (stool test). These exams will check for colon cancer.    Talk with your health care provider about whether or not a prostate cancer screening test (PSA) is right for you.  You should be tested each year for STDs (sexually transmitted diseases), if you re at risk.     Shots: Get a flu shot each year. Get a tetanus shot every 10 years.     Nutrition:  Eat at least 5 servings of fruits and vegetables daily.   Eat whole-grain bread, whole-wheat pasta and brown rice instead of white grains and rice.   Get adequate Calcium and Vitamin D.     Lifestyle  Exercise for at least 150 minutes a week (30 minutes a day, 5 days a week). This will help you control your weight and prevent disease.   Limit alcohol to one drink per day.   No smoking.   Wear sunscreen to prevent skin cancer.   See your dentist every six months for an exam and cleaning.   See your eye doctor every 1 to 2 years.

## 2023-11-13 NOTE — PROGRESS NOTES
ASSESSMENT & PLAN    Escobar was seen today for pain.    Diagnoses and all orders for this visit:    Arthritis of right hip    Hip pain, right  -     Orthopedic  Referral  -     XR Pelvis 1/2 Views    Right hip impingement syndrome    Greater trochanteric pain syndrome      This issue is chronic and Unchanged.      ICD-10-CM    1. Arthritis of right hip  M16.11       2. Hip pain, right  M25.551 Orthopedic  Referral     XR Pelvis 1/2 Views      3. Right hip impingement syndrome  M25.851       4. Greater trochanteric pain syndrome  M25.559         Patient Instructions   We discussed these other possible diagnosis: Symptoms likely combination of right hip impingement early arthritis and greater trochanteric pain syndrome.    We discussed the following treatment options: symptom treatment, activity modification/rest, imaging, rehab and referral. Following discussion, plan: will start with physical therapy.    Right lower leg pain more likely related to prior back surgery - monitor symptoms.    Plan:  - Today's Plan of Care:  Rehab: Physical Therapy: [facility] - [phone]    Discussed activity considerations and other supportive care including Ice/Heat, OTC and other topical medications as needed.    -We also discussed other future treatment options:  Consideration of injections (US guided hip joint v. Bursa)  MRI if more severe pain  Referral to Orthopedic Surgery    Follow Up: 6 - 8 weeks    Concerning signs and symptoms were reviewed and all questions were answered at this time.    Thanks for the opportunity to participate in the care of this patient, I will keep you updated on their progress.    CC: Juan Griffin MD [facility]  Sports Medicine Physician  [facility]    -----  Chief Complaint   Patient presents with    Right Hip - Pain       SUBJECTIVE  Rangel Singh is a/an 58 year old male who is seen in consultation at the request of  Juan BORRERO  "Alejandro [facility] for evaluation of right hip pain.     The patient is seen with their wife.    Onset: 3 month(s) ago. Reports insidious onset without acute precipitating event.  Location of Pain: right hip, mostly lateral  Worsened by: sitting, prolonged sitting is awful, prolonged standing on hard surfaced, sleeping   Better with: moving around, naproxen   Treatments tried: Aleve and previous imaging (xray 10/30/23)  Associated symptoms: pain and pain that radiates to middle low leg     Orthopedic/Surgical history: YES - Previous low back surgery for right sided dis herniation  Social History/Occupation: retired       REVIEW OF SYSTEMS:  Review of Systems    OBJECTIVE:  Ht 1.778 m (5' 10\")   Wt 84.4 kg (186 lb)   BMI 26.69 kg/m     General: healthy, alert and in no distress  Skin: no suspicious lesions or rash.  CV: distal perfusion intact   Resp: normal respiratory effort without conversational dyspnea   Psych: normal mood and affect  Gait: NORMAL  Neuro: Normal light sensory exam of lower extremity    Bilateral hip exam    Inspection:      no edema or ecchymosis in hip area    Tender:      none right, points to lateral hip    Non Tender:      remainder of the hip area bilateral    ROM:     Range of motion limited in internal rotation bilaterally    Strength:      flexion 5/5 bilateral       abduction 5-/5 right       adduction 5/5 bilateral    Sensation:      grossly intact in hip and thigh    Special Tests:      neg (-) MARQUES bilateral       positive (+) FADIR right    Flexibility:      positive (+) Nichole's bilateral      RADIOLOGY:  Final results and radiologist's interpretation, available in the [facility] health record.  Images were reviewed with the patient in the office today.  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 No acute fracture.    Lower lumbar facet arthropathy.    JER JORDAN DO         SYSTEM ID:  [id number]         Review of prior external note(s) from - PCP  Review of the result(s) of each unique test - XRs

## 2023-11-15 ENCOUNTER — OFFICE VISIT (OUTPATIENT)
Dept: ORTHOPEDICS | Facility: CLINIC | Age: 58
End: 2023-11-15
Attending: FAMILY MEDICINE
Payer: COMMERCIAL

## 2023-11-15 ENCOUNTER — ANCILLARY PROCEDURE (OUTPATIENT)
Dept: GENERAL RADIOLOGY | Facility: CLINIC | Age: 58
End: 2023-11-15
Attending: PEDIATRICS
Payer: COMMERCIAL

## 2023-11-15 VITALS — BODY MASS INDEX: 26.63 KG/M2 | HEIGHT: 70 IN | WEIGHT: 186 LBS

## 2023-11-15 DIAGNOSIS — M25.551 HIP PAIN, RIGHT: ICD-10-CM

## 2023-11-15 DIAGNOSIS — M25.559 GREATER TROCHANTERIC PAIN SYNDROME: ICD-10-CM

## 2023-11-15 DIAGNOSIS — M25.851 RIGHT HIP IMPINGEMENT SYNDROME: ICD-10-CM

## 2023-11-15 DIAGNOSIS — M16.11 ARTHRITIS OF RIGHT HIP: Primary | ICD-10-CM

## 2023-11-15 PROCEDURE — 99244 OFF/OP CNSLTJ NEW/EST MOD 40: CPT | Performed by: PEDIATRICS

## 2023-11-15 PROCEDURE — 72170 X-RAY EXAM OF PELVIS: CPT | Mod: TC | Performed by: RADIOLOGY

## 2023-11-15 NOTE — LETTER
11/15/2023         RE: Rangel Singh  [id number]  [facility] [id number]        Dear Colleague,    Thank you for referring your patient, Rangel Singh, to the [facility] [place]. Please see a copy of my visit note below.    ASSESSMENT & PLAN    Escobar was seen today for pain.    Diagnoses and all orders for this visit:    Arthritis of right hip    Hip pain, right  -     Orthopedic  Referral  -     XR Pelvis 1/2 Views    Right hip impingement syndrome    Greater trochanteric pain syndrome      This issue is chronic and Unchanged.      ICD-10-CM    1. Arthritis of right hip  M16.11       2. Hip pain, right  M25.551 Orthopedic  Referral     XR Pelvis 1/2 Views      3. Right hip impingement syndrome  M25.851       4. Greater trochanteric pain syndrome  M25.559         Patient Instructions   We discussed these other possible diagnosis: Symptoms likely combination of right hip impingement early arthritis and greater trochanteric pain syndrome.    We discussed the following treatment options: symptom treatment, activity modification/rest, imaging, rehab and referral. Following discussion, plan: will start with physical therapy.    Right lower leg pain more likely related to prior back surgery - monitor symptoms.    Plan:  - Today's Plan of Care:  Rehab: Physical Therapy: [facility] - [phone]    Discussed activity considerations and other supportive care including Ice/Heat, OTC and other topical medications as needed.    -We also discussed other future treatment options:  Consideration of injections (US guided hip joint v. Bursa)  MRI if more severe pain  Referral to Orthopedic Surgery    Follow Up: 6 - 8 weeks    Concerning signs and symptoms were reviewed and all questions were answered at this time.    Thanks for the opportunity to participate in the care of this patient, I will keep you updated on their progress.    CC: Juan Allen D.O.  "    Breann Griffin MD [facility]  Sports Medicine Physician  [facility]    -----  Chief Complaint   Patient presents with     Right Hip - Pain       SUBJECTIVE  Rangel Singh is a/an 58 year old male who is seen in consultation at the request of  Juan Allen D.O. for evaluation of right hip pain.     The patient is seen with their wife.    Onset: 3 month(s) ago. Reports insidious onset without acute precipitating event.  Location of Pain: right hip, mostly lateral  Worsened by: sitting, prolonged sitting is awful, prolonged standing on hard surfaced, sleeping   Better with: moving around, naproxen   Treatments tried: Aleve and previous imaging (xray 10/30/23)  Associated symptoms: pain and pain that radiates to middle low leg     Orthopedic/Surgical history: YES - Previous low back surgery for right sided dis herniation  Social History/Occupation: retired       REVIEW OF SYSTEMS:  Review of Systems    OBJECTIVE:  Ht 1.778 m (5' 10\")   Wt 84.4 kg (186 lb)   BMI 26.69 kg/m     General: healthy, alert and in no distress  Skin: no suspicious lesions or rash.  CV: distal perfusion intact   Resp: normal respiratory effort without conversational dyspnea   Psych: normal mood and affect  Gait: NORMAL  Neuro: Normal light sensory exam of lower extremity    Bilateral hip exam    Inspection:      no edema or ecchymosis in hip area    Tender:      none right, points to lateral hip    Non Tender:      remainder of the hip area bilateral    ROM:     Range of motion limited in internal rotation bilaterally    Strength:      flexion 5/5 bilateral       abduction 5-/5 right       adduction 5/5 bilateral    Sensation:      grossly intact in hip and thigh    Special Tests:      neg (-) MARQUES bilateral       positive (+) FADIR right    Flexibility:      positive (+) Nichole's bilateral      RADIOLOGY:  Final results and radiologist's interpretation, available in the [facility] health record.  Images were reviewed with the " patient in the office today.  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 No acute fracture.    Lower lumbar facet arthropathy.    JER JORDAN DO         SYSTEM ID:  [id number]         Review of prior external note(s) from - PCP  Review of the result(s) of each unique test - XRs             Again, thank you for allowing me to participate in the care of your patient.        Sincerely,        Breann Griffin MD

## 2023-11-15 NOTE — PATIENT INSTRUCTIONS
We discussed these other possible diagnosis: Symptoms likely combination of right hip impingement early arthritis and greater trochanteric pain syndrome.    We discussed the following treatment options: symptom treatment, activity modification/rest, imaging, rehab and referral. Following discussion, plan: will start with physical therapy.    Right lower leg pain more likely related to prior back surgery - monitor symptoms.    Plan:  - Today's Plan of Care:  [facility]hab: [facility] [phone]    Discussed activity considerations and other supportive care including Ice/Heat, OTC and other topical medications as needed.    -We also discussed other future treatment options:  Consideration of injections (US guided hip joint v. Bursa)  MRI if more severe pain  Referral to Orthopedic Surgery    Follow Up: 6 - 8 weeks    If you have any further questions for your physician or physician s care team you can call [phone] and use option 3 to leave a voice message.

## 2023-11-16 ENCOUNTER — TELEPHONE (OUTPATIENT)
Dept: ORTHOPEDICS | Facility: CLINIC | Age: 58
End: 2023-11-16
Payer: COMMERCIAL

## 2023-11-16 DIAGNOSIS — M16.11 ARTHRITIS OF RIGHT HIP: ICD-10-CM

## 2023-11-16 DIAGNOSIS — M25.851 RIGHT HIP IMPINGEMENT SYNDROME: ICD-10-CM

## 2023-11-16 DIAGNOSIS — M25.551 HIP PAIN, RIGHT: Primary | ICD-10-CM

## 2023-11-16 NOTE — TELEPHONE ENCOUNTER
[facility] Call Center    Phone Message    May a detailed message be left on voicemail: yes     Reason for Call: Order(s): Other:   Reason for requested: Physical Therapy  Date needed: asap  Provider name: Breann Griffin    Send to [facility]    Action Taken: Other: Sports Medicine    Travel Screening: Not Applicable

## 2023-11-16 NOTE — TELEPHONE ENCOUNTER
Orders for physical therapy are placed, My Chart message is sent to patient regarding this.    Sara Ken ATC, CSCS  Dr. Griffin's Clinical Coordinator  [facility] Sports Medicine Team

## 2023-12-05 ENCOUNTER — THERAPY VISIT (OUTPATIENT)
Dept: PHYSICAL THERAPY | Facility: CLINIC | Age: 58
End: 2023-12-05
Attending: PEDIATRICS
Payer: COMMERCIAL

## 2023-12-05 DIAGNOSIS — M25.551 HIP PAIN, RIGHT: ICD-10-CM

## 2023-12-05 DIAGNOSIS — M25.851 RIGHT HIP IMPINGEMENT SYNDROME: ICD-10-CM

## 2023-12-05 DIAGNOSIS — M16.11 ARTHRITIS OF RIGHT HIP: ICD-10-CM

## 2023-12-05 PROCEDURE — 97530 THERAPEUTIC ACTIVITIES: CPT | Mod: GP | Performed by: PHYSICAL THERAPIST

## 2023-12-05 PROCEDURE — 97110 THERAPEUTIC EXERCISES: CPT | Mod: GP | Performed by: PHYSICAL THERAPIST

## 2023-12-05 PROCEDURE — 97161 PT EVAL LOW COMPLEX 20 MIN: CPT | Mod: GP | Performed by: PHYSICAL THERAPIST

## 2023-12-05 NOTE — PROGRESS NOTES
PHYSICAL THERAPY EVALUATION  Type of Visit: Evaluation    See electronic medical record for Abuse and Falls Screening details.    Subjective  Patient presents today with hip pain complaints on both sides. Insidious onset and has been going on over the past few months.  Had low back surgery for disc back in February.  Got back to running up to 8 miles after surgery and feeling pretty good over the summer.  Around August stopped running (not due to pain but to due to time) and about a month later started having lateral hip pain on both sides.  Notices pain the most with walking long term or pivoting/twisting causes pain in both hips. No pain with laying on his side or laying down.   The  Has been telling him the cartilage in his joints are wearing out and he has impingement that is causing the pain.       Presenting condition or subjective complaint:   B hip pain   Date of onset: 12/05/23    Relevant medical history:   disc surgery February 2023, DM type I  Dates & types of surgery:      Prior diagnostic imaging/testing results:     AP Pelvis XR view reviewed with XRs 10/30/2023 reviewed: no acute bony abnormality, CAM deformity noted, mild degenerative change   Prior therapy history for the same diagnosis, illness or injury:    doing some HS stretching from last round in therapy.  Does squats, planks and other exercises at home about every other day.     Prior Level of Function  Transfers:   Ambulation:   ADL:   IADL:     Living Environment  Social support:     Type of home:     Stairs to enter the home:         Ramp:     Stairs inside the home:         Help at home:    Equipment owned:       Employment:    retired   Hobbies/Interests:  running, chopping wood     Patient goals for therapy:  walk a couple miles without pain, decrease pain    Pain assessment:      Objective   HIP EVALUATION  PAIN: Pain Level at Rest: 0/10  Pain Level with Use: 3/10  Pain Location: hip  Pain Quality: Aching and Dull  Pain Frequency:  constant  Pain is Worst: daytime  Pain is Exacerbated By: sitting, walking, certain positions   Pain is Relieved By: rest, OTC meds, laying down   INTEGUMENTARY (edema, incisions):   POSTURE:   GAIT:   Weightbearing Status:   Assistive Device(s):   Gait Deviations:  mild decrease in B hip extension   BALANCE/PROPRIOCEPTION: SL stance 10 sec B (feels pain in lateral hip on stance leg that decreases once pressure is off)   ROM:   (Degrees) Left AROM Left PROM  Right AROM Right PROM   Hip Flexion 110  110    Hip Extension       Hip Abduction 40  30    Hip Adduction 20  20    Hip Internal Rotation 25 mild discomfort lateral hip  20 pain on lateral hip     Hip External Rotation 25  30    Knee Flexion       Knee Extension       Lumbar Side glide Finger tips to knee joint Finger tips to join line    Lumbar Flexion Finger tips 12 inches from floor, tight in HS    Lumbar Extension 25% limited no increase in pain    STRENGTH:   Pain: - none + mild ++ moderate +++ severe  Strength Scale: 0-5/5 Left Right   Hip Flexion 5- 4+  mild lateral hip discomfrt    Hip Extension     Hip Abduction 4 4   Hip Adduction     Hip Internal Rotation 5- 5-   Hip External Rotation 5- 5-   Knee Flexion 5 5   Knee Extension 5 5     LE FLEXIBILITY: mod tightness B hamstrings   SPECIAL TESTS:    Left Right   MARQUES Positive Positive   FADIR/Labrum/WANDA Positive Positive   Femoral Nerve     Nichole's     Piriformis     Quadrant Testing     SLR     Slump     Stork with Extension     Justen            FUNCTIONAL TESTS:   Sit <> stand: ache down the lateral hips with standing.   PALPATION:  ttp over ITB and greater trochanter B.  Ttp along pirirformis and glut med B   JOINT MOBILITY: PA glide hip:  mod hypomobility left,  mild hypomobility right    Assessment & Plan   CLINICAL IMPRESSIONS  Medical Diagnosis: Hip pain, right (M25.551)    Arthritis of right hip (M16.11)    Right hip impingement syndrome    Treatment Diagnosis: B hip pain   Impression/Assessment:  Patient is a 58 year old male with B hip pain complaints.  The following significant findings have been identified: Pain, Decreased ROM/flexibility, Decreased joint mobility, and Decreased strength. These impairments interfere with their ability to perform recreational activities and running/walking  as compared to previous level of function.     Clinical Decision Making (Complexity):  Clinical Presentation: Stable/Uncomplicated  Clinical Presentation Rationale: based on medical and personal factors listed in PT evaluation  Clinical Decision Making (Complexity): Low complexity    PLAN OF CARE  Treatment Interventions:  Interventions: Gait Training, Manual Therapy, Neuromuscular Re-education, Therapeutic Activity, Therapeutic Exercise, Self-Care/Home Management    Long Term Goals     PT Goal 1  Goal Identifier: 1  Goal Description: Patient will be able to identify which positions to avoid to decrease irritation from CAM lesion in hip.  Target Date: 01/16/24  PT Goal 2  Goal Identifier: 2  Goal Description: Patient will be independent in HEP in order to improve strength, ROM and pain outside of PT.  Target Date: 01/02/24  PT Goal 3  Goal Identifier: 3  Goal Description: Patient will improve B hip abd strength to 5-/5 in order to improve stability at hips for prolonged walking and running.  Target Date: 01/02/24      Frequency of Treatment: 1x/week  Duration of Treatment: 8 weeks    Recommended Referrals to Other Professionals:   Education Assessment:   Learner/Method: Patient  Education Comments: educated on role of PT, POC and HEP    Risks and benefits of evaluation/treatment have been explained.   Patient/Family/caregiver agrees with Plan of Care.     Evaluation Time:     PT Eval, Low Complexity Minutes (15394): 19       Signing Clinician: Marisa Pacheco PT

## 2024-02-23 ENCOUNTER — ANESTHESIA EVENT (OUTPATIENT)
Dept: GASTROENTEROLOGY | Facility: CLINIC | Age: 59
End: 2024-02-23
Payer: COMMERCIAL

## 2024-02-23 NOTE — ANESTHESIA PREPROCEDURE EVALUATION
Anesthesia Pre-Procedure Evaluation    Patient: Rangel Singh   MRN: [id number] : 1965        Procedure : Procedure(s):  Colonoscopy          Past Medical History:   Diagnosis Date    CELLULITIS NOS     left danny-auricular     Facial cellulitis 2014    Type 1 diabetes mellitus (H)       Past Surgical History:   Procedure Laterality Date    COLONOSCOPY  1/3/2014    Procedure: COMBINED COLONOSCOPY, SINGLE BIOPSY/POLYPECTOMY BY BIOPSY;  Colonoscopy;  Surgeon: Ed Galvez MD;  Location: [facility]    DISCECTOMY LUMBAR POSTERIOR MICROSCOPIC ONE LEVEL Right 2023    Procedure: Right Lumbar 4-5 Microdiscectomy;  Surgeon: Stephan Fregoso MD;  [facility]    EXAM UNDER ANESTHESIA, MANIPULATE JOINT (LOCATION) Left 3/30/2016    Procedure: EXAM UNDER ANESTHESIA, MANIPULATE JOINT (LOCATION);  Surgeon: Justen Ford MD;  Location: [facility]    SURGICAL HISTORY OF -       appendectomy ([place], [place])       Allergies   Allergen Reactions    Nkda [No Known Drug Allergy]       Social History     Tobacco Use    Smoking status: Never    Smokeless tobacco: Never   Substance Use Topics    Alcohol use: Yes     Comment: occ. 2-3 times a month      Wt Readings from Last 1 Encounters:   11/15/23 84.4 kg (186 lb)        Anesthesia Evaluation   Pt has had prior anesthetic.         ROS/MED HX  ENT/Pulmonary:       Neurologic:       Cardiovascular: Comment: LVH (left ventricular hypertrophy)    (+) Dyslipidemia - -   -  - -                                 Previous cardiac testing   Echo: Date:  Results:  Procedure  Complete Echo Adult.  ______________________________________________________________________________  Interpretation Summary     Left ventricular systolic function is normal.The visual ejection fraction is  55-60%.  The right ventricle is mildly dilated.The right ventricular systolic function  is normal.  The inferior vena cava was normal in size with preserved  respiratory  variability.  ______________________________________________________________________________  Left Ventricle  The left ventricle is normal in size. There is concentric remodeling present.  Left ventricular systolic function is normal. The visual ejection fraction is  55-60%. Diastolic Doppler findings (E/E' ratio and/or other parameters)  suggest left ventricular filling pressures are indeterminate. Septal motion is  consistent with conduction abnormality.     Right Ventricle  The right ventricle is mildly dilated. The right ventricular systolic function  is normal.     Atria  Normal left atrial size. Right atrial size is normal. There is no color  Doppler evidence of an atrial shunt.     Mitral Valve  There is trace mitral regurgitation.     Tricuspid Valve  There is trace tricuspid regurgitation. Right ventricular systolic pressure  could not be approximated due to inadequate tricuspid regurgitation.     Aortic Valve  Mild aortic valve sclerosis. No aortic regurgitation is present. No aortic  stenosis is present.     Pulmonic Valve  There is trace pulmonic valvular regurgitation. There is no pulmonic valvular  stenosis.     Vessels  The aortic root is normal size. Normal size ascending aorta. The inferior vena  cava was normal in size with preserved respiratory variability.     Pericardium  There is no pericardial effusion.     Rhythm    Stress Test:  Date: Results:    ECG Reviewed:  Date: 2/23 Results:  Sinus rhythm   Left axis deviation   Left bundle branch block     Cath:  Date: Results:      METS/Exercise Tolerance:     Hematologic:       Musculoskeletal:       GI/Hepatic:       Renal/Genitourinary:       Endo:     (+)  type II DM,                    Psychiatric/Substance Use:       Infectious Disease:       Malignancy:       Other:            Physical Exam    Airway        Mallampati: II   TM distance: > 3 FB   Neck ROM: full   Mouth opening: > 3 cm    Respiratory Devices and Support  Comment:  "Not on oxygen currently       Dental  no notable dental history         Cardiovascular   cardiovascular exam normal          Pulmonary   pulmonary exam normal                OUTSIDE LABS:  CBC:   Lab Results   Component Value Date    WBC 5.0 02/17/2023    WBC 4.6 11/01/2021    HGB 13.4 02/17/2023    HGB 13.5 11/01/2021    HCT 40.2 02/17/2023    HCT 40.6 11/01/2021     02/17/2023     11/01/2021     BMP:   Lab Results   Component Value Date     10/23/2023     02/01/2023    POTASSIUM 4.8 10/23/2023    POTASSIUM 4.5 02/01/2023    CHLORIDE 104 10/23/2023    CHLORIDE 104 02/01/2023    CO2 28 10/23/2023    CO2 30 (H) 02/01/2023    BUN 20.7 10/23/2023    BUN 25.1 (H) 02/01/2023    CR 0.79 10/23/2023    CR 0.82 02/01/2023     (H) 10/23/2023     (H) 02/17/2023     COAGS: No results found for: \"PTT\", \"INR\", \"FIBR\"  POC:   Lab Results   Component Value Date    BGM 82 03/30/2016     HEPATIC:   Lab Results   Component Value Date    ALBUMIN 3.8 01/11/2020    PROTTOTAL 7.2 01/11/2020    ALT 31 01/11/2020    AST 25 01/11/2020    ALKPHOS 96 01/11/2020    BILITOTAL 0.7 01/11/2020     OTHER:   Lab Results   Component Value Date    A1C 6.1 (H) 10/23/2023    ISMAEL 9.5 10/23/2023    LIPASE 95 03/06/2019    TSH 2.03 01/04/2021       Anesthesia Plan    ASA Status:  3       Anesthesia Type: General.   Induction: Intravenous, Propofol.   Maintenance: TIVA.        Consents    Anesthesia Plan(s) and associated risks, benefits, and realistic alternatives discussed. Questions answered and patient/representative(s) expressed understanding.     - Discussed: Risks, Benefits and Alternatives for BOTH SEDATION and the PROCEDURE were discussed     - Discussed with:  Patient            Postoperative Care    Pain management: IV analgesics, Oral pain medications, Multi-modal analgesia.   PONV prophylaxis: Ondansetron (or other 5HT-3), Dexamethasone or Solumedrol     Comments:    Other Comments: Patient aware of plan, " what to expect, and potential risks. Timeout was performed before bringing the patient back to OR, and once again, patient was asked if they had any questions           LEXI Lainez CRNA    I have reviewed the pertinent notes and labs in the chart from the past 30 days and (re)examined the patient.  Any updates or changes from those notes are reflected in this note.

## 2024-02-26 ENCOUNTER — ANESTHESIA (OUTPATIENT)
Dept: GASTROENTEROLOGY | Facility: CLINIC | Age: 59
End: 2024-02-26
Payer: COMMERCIAL

## 2024-02-26 ENCOUNTER — HOSPITAL ENCOUNTER (OUTPATIENT)
Facility: CLINIC | Age: 59
Discharge: HOME | End: 2024-02-26
Attending: SURGERY | Admitting: SURGERY
Payer: COMMERCIAL

## 2024-02-26 VITALS
BODY MASS INDEX: 26.63 KG/M2 | HEIGHT: 70 IN | HEART RATE: 64 BPM | DIASTOLIC BLOOD PRESSURE: 76 MMHG | SYSTOLIC BLOOD PRESSURE: 125 MMHG | RESPIRATION RATE: 16 BRPM | WEIGHT: 186 LBS | TEMPERATURE: 98.3 F | OXYGEN SATURATION: 97 %

## 2024-02-26 LAB
COLONOSCOPY: NORMAL
GLUCOSE BLDC GLUCOMTR-MCNC: 110 MG/DL (ref 70–99)

## 2024-02-26 PROCEDURE — 258N000003 HC RX IP 258 OP 636: Performed by: PHYSICIAN ASSISTANT

## 2024-02-26 PROCEDURE — 258N000003 HC RX IP 258 OP 636: Performed by: NURSE ANESTHETIST, CERTIFIED REGISTERED

## 2024-02-26 PROCEDURE — G0121 COLON CA SCRN NOT HI RSK IND: HCPCS | Performed by: SURGERY

## 2024-02-26 PROCEDURE — 370N000017 HC ANESTHESIA TECHNICAL FEE, PER MIN: Performed by: SURGERY

## 2024-02-26 PROCEDURE — 45378 DIAGNOSTIC COLONOSCOPY: CPT | Performed by: SURGERY

## 2024-02-26 PROCEDURE — 250N000011 HC RX IP 250 OP 636

## 2024-02-26 PROCEDURE — 82962 GLUCOSE BLOOD TEST: CPT

## 2024-02-26 RX ORDER — SODIUM CHLORIDE, SODIUM LACTATE, POTASSIUM CHLORIDE, CALCIUM CHLORIDE 600; 310; 30; 20 MG/100ML; MG/100ML; MG/100ML; MG/100ML
INJECTION, SOLUTION INTRAVENOUS CONTINUOUS
Status: DISCONTINUED | OUTPATIENT
Start: 2024-02-26 | End: 2024-02-26 | Stop reason: HOSPADM

## 2024-02-26 RX ORDER — LIDOCAINE 40 MG/G
CREAM TOPICAL
Status: DISCONTINUED | OUTPATIENT
Start: 2024-02-26 | End: 2024-02-26 | Stop reason: HOSPADM

## 2024-02-26 RX ORDER — PROPOFOL 10 MG/ML
INJECTION, EMULSION INTRAVENOUS PRN
Status: DISCONTINUED | OUTPATIENT
Start: 2024-02-26 | End: 2024-02-26

## 2024-02-26 RX ADMIN — SODIUM CHLORIDE, POTASSIUM CHLORIDE, SODIUM LACTATE AND CALCIUM CHLORIDE: 600; 310; 30; 20 INJECTION, SOLUTION INTRAVENOUS at 10:25

## 2024-02-26 RX ADMIN — SODIUM CHLORIDE, POTASSIUM CHLORIDE, SODIUM LACTATE AND CALCIUM CHLORIDE: 600; 310; 30; 20 INJECTION, SOLUTION INTRAVENOUS at 10:18

## 2024-02-26 RX ADMIN — PROPOFOL 100 MG: 10 INJECTION, EMULSION INTRAVENOUS at 10:27

## 2024-02-26 RX ADMIN — PROPOFOL 200 MCG/KG/MIN: 10 INJECTION, EMULSION INTRAVENOUS at 10:29

## 2024-02-26 NOTE — ANESTHESIA CARE TRANSFER NOTE
Patient: Rangel Singh    Procedure: Procedure(s):  Colonoscopy       Diagnosis: Screen for colon cancer [Z12.11]  Diagnosis Additional Information: No value filed.    Anesthesia Type:   General     Note:    Oropharynx: oropharynx clear of all foreign objects  Level of Consciousness: awake      Independent Airway: airway patency satisfactory and stable  Dentition: dentition unchanged  Vital Signs Stable: post-procedure vital signs reviewed and stable  Report to RN Given: handoff report given  Patient transferred to: Phase II    Handoff Report: Identifed the Patient, Identified the Reponsible Provider, Reviewed the pertinent medical history, Discussed the surgical course, Reviewed Intra-OP anesthesia mangement and issues during anesthesia, Set expectations for post-procedure period and Allowed opportunity for questions and acknowledgement of understanding      Vitals:  Vitals Value Taken Time   /69 02/26/24 1107   Temp     Pulse 80 02/26/24 1107   Resp 16 02/26/24 1107   SpO2 97 % 02/26/24 1107   Vitals shown include unfiled device data.    Electronically Signed By: LEXI Kitchen CRNA  February 26, 2024  11:08 AM

## 2024-02-26 NOTE — H&P
Formerly [facility]    Pre-Endoscopy History and Physical     Rangel Singh MRN# [id number]   YOB: 1965 Age: 58 year old     Date of Procedure: 2/26/2024  Primary care provider: No Ref-Primary, Physician  Type of Endoscopy: Colonoscopy with possible biopsy, possible polypectomy  Reason for Procedure: screening  Type of Anesthesia Anticipated: MAC    HPI:    Rangel is a 58 year old male who will be undergoing the above procedure.  Colon 2014 (nl; lymphoid agg); no blood thinner; no famhx of colon cancer; AF    A history and physical has been performed. The patient's medications and allergies have been reviewed. The risks and benefits of the procedure and the sedation options and risks were discussed with the patient.  All questions were answered and informed consent was obtained.      He denies a personal or family history of anesthesia complications or bleeding disorders.     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 (H)    Hyperlipidemia LDL goal <100    Erectile dysfunction    Urinary urgency    Adhesive capsulitis of shoulder, left    Type 1 diabetes mellitus with hypoglycemia and without coma (H)    Lumbar back pain with radiculopathy affecting right lower extremity    Lumbar radiculopathy    Herniated lumbar intervertebral disc    Spondylolisthesis, grade 1    Hip pain, right    Arthritis of right hip    Right hip impingement syndrome        Past Medical History:   Diagnosis Date    CELLULITIS NOS     left danny-auricular 2004    Facial cellulitis 08/11/2014    Type 1 diabetes mellitus (H)         Past Surgical History:   Procedure Laterality Date    COLONOSCOPY  1/3/2014    Procedure: COMBINED COLONOSCOPY, SINGLE BIOPSY/POLYPECTOMY BY BIOPSY;  Colonoscopy;  Surgeon: Ed Galvez MD;  Location: [facility]    DISCECTOMY LUMBAR POSTERIOR  MICROSCOPIC ONE LEVEL Right 2/17/2023    Procedure: Right Lumbar 4-5 Microdiscectomy;  Surgeon: Stephan Fregoso MD;  [facility]    EXAM UNDER ANESTHESIA, MANIPULATE JOINT (LOCATION) Left 3/30/2016    Procedure: EXAM UNDER ANESTHESIA, MANIPULATE JOINT (LOCATION);  Surgeon: Justen Ford MD;  Location: [facility]    SURGICAL HISTORY OF -   08/97    appendectomy ([place], [place])        Social History     Tobacco Use    Smoking status: Never    Smokeless tobacco: Never   Substance Use Topics    Alcohol use: Yes     Comment: occ. 2-3 times a month       Family History   Problem Relation Age of Onset    C.A.D. Father         50s     Hypertension Father     Atrial fibrillation Father     Hypertension Mother     Cerebrovascular Disease Mother     Atrial fibrillation Mother     Cancer Maternal Grandfather         ?throat/lung cancer    Cerebrovascular Disease Maternal Grandmother     Arthritis Sister         RA    C.A.D. Brother     Atrial fibrillation Brother     Circulatory Other         2 cousins on maternal side .w anurysms    Cancer - colorectal No family hx of     Diabetes No family hx of        Prior to Admission medications    Medication Sig Start Date End Date Taking? Authorizing Provider   ASPIRIN 81 MG OR TABS  12/15/05   Blair Henao MD   blood glucose (NO BRAND SPECIFIED) test strip Check BS 4-5x/day.  Dispense 3 month supply. Accu-Check Compact Plus. 1/8/19   Ana Laura Ji DO   Cholecalciferol (VITAMIN D) 50 MCG (2000 UT) CAPS     Reported, Patient   ezetimibe (ZETIA) 10 MG tablet Take 1 tablet by mouth every morning 4/13/23 4/12/24  Reported, Patient   glucagon (GLUCAGON EMERGENCY) 1 MG kit Inject 1 mg into the muscle once for 1 dose 1/8/19 1/8/19  Ana Laura Ji DO   insulin glargine (LANTUS VIAL) 100 UNIT/ML vial INJECT 2 TO 5 UNITS SUBCUTANEOUSLY AT BEDTIME  Patient taking differently: 8 Units at bedtime INJECT 2 TO 5 UNITS SUBCUTANEOUSLY AT BEDTIME 1/8/19   Ana Laura Ji DO  "  insulin lispro (HUMALOG) 100 UNIT/ML vial INJECT 2-3 UNITS IN THE MORNING, 2-3 AT LUNCH, AND 2-4 IN THE EVENING AND SLIDING SCALE. ESTIMATED MAXIMUM UNITS DAILY IS 25 UNITS PER DAY  Patient taking differently: Inject 8-10 Units Subcutaneous 3 times daily (before meals) INJECT 2-3 UNITS IN THE MORNING, 2-3 AT LUNCH, AND 2-4 IN THE EVENING AND SLIDING SCALE. ESTIMATED MAXIMUM UNITS DAILY IS 25 UNITS PER DAY 1/8/19   Ana Laura Ji,    insulin syringe-needle U-100 (BD VEO INSULIN SYRINGE U/F) 31G X 15/64\" 0.3 ML Use 4 syringes daily or as directed. 1/8/19   Ana Laura Ji DO   insulin syringes, disposable, U-100 0.3 ML MISC 1 Device 4 times daily 1/8/19   Ana Laura Ji DO   lisinopril (PRINIVIL/ZESTRIL) 10 MG tablet Take 1 tablet (10 mg) by mouth daily 1/8/19   Ana Laura Ji DO   order for DME Equipment being ordered: Continue glucose monitor - Dexcom G4 - , transmitter, sensor 10/30/15   Ana Laura iJ DO   rosuvastatin (CRESTOR) 40 MG tablet  1/6/23   Reported, Patient   TRUEPLUS LANCETS 33G MISC 1 Device 4 times daily 1/8/19   Ana Laura Ji DO       Allergies   Allergen Reactions    Nkda [No Known Drug Allergy]         REVIEW OF SYSTEMS:   5 point ROS negative except as noted above in HPI, including Gen., Resp., CV, GI &  system review.    PHYSICAL EXAM:   BP (!) 159/84 (BP Location: Right arm)   Pulse 66   Temp 98.3  F (36.8  C) (Oral)   Ht 1.778 m (5' 10\")   Wt 84.4 kg (186 lb)   SpO2 98%   BMI 26.69 kg/m   Estimated body mass index is 26.69 kg/m  as calculated from the following:    Height as of this encounter: 1.778 m (5' 10\").    Weight as of this encounter: 84.4 kg (186 lb).   Constitutional: Awake, alert, no acute distress.  Eyes: No scleral icterus.  Conjunctiva are without injection.  ENMT: Mucous membranes moist, dentition and gums are intact.   Neck: Soft, supple, trachea midline.    Endocrine: n/a   Lymphatic: There is no cervical, " submandibularadenopathy.  Respiratory: normal effortgs   Cardiovascular: S1, S2  Abdomen: Non-distended, non-tender,  No masses,  Musculoskeletal: No spinal or CVA tenderness. Full range of motion in the upper and lower extremities.    Skin: No skin rashes or lesions to inspection.  No petechia.    Neurologic: alerted and oriented 3x  Psychiatric: The patient's affect is not blunted and mood is appropriate.  DIAGNOSTICS:    Not indicated    IMPRESSION   ASA Class 2 - Mild systemic disease    PLAN:   Plan for Colonoscopy with possible biopsy, possible polypectomy. We discussed the risks, benefits and alternatives and the patient wished to proceed.  Patient is cleared for the above procedure.    The above has been forwarded to the consulting provider.    [facility], [facility]

## 2024-02-26 NOTE — ANESTHESIA CARE TRANSFER NOTE
Patient: Rangel Singh    Procedure: Procedure(s):  Colonoscopy       Diagnosis: Screen for colon cancer [Z12.11]  Diagnosis Additional Information: No value filed.    Anesthesia Type:   General     Note:    Oropharynx: oropharynx clear of all foreign objects  Level of Consciousness: awake      Independent Airway: airway patency satisfactory and stable  Dentition: dentition unchanged  Vital Signs Stable: post-procedure vital signs reviewed and stable  Report to RN Given: handoff report given  Patient transferred to: Phase II    Handoff Report: Identifed the Patient, Identified the Reponsible Provider, Reviewed the pertinent medical history, Discussed the surgical course, Reviewed Intra-OP anesthesia mangement and issues during anesthesia, Set expectations for post-procedure period and Allowed opportunity for questions and acknowledgement of understanding      Vitals:  Vitals Value Taken Time   /69 02/26/24 1107   Temp     Pulse 80 02/26/24 1107   Resp 16 02/26/24 1107   SpO2 97 % 02/26/24 1112   Vitals shown include unfiled device data.    Electronically Signed By: LEXI Kitchen CRNA  February 26, 2024  11:13 AM

## 2024-02-26 NOTE — ANESTHESIA POSTPROCEDURE EVALUATION
Patient: Rangel Singh    Procedure: Procedure(s):  Colonoscopy       Anesthesia Type:  General    Note:  Disposition: Outpatient   Postop Pain Control: Uneventful            Sign Out: Well controlled pain   PONV: No   Neuro/Psych: Uneventful            Sign Out: Acceptable/Baseline neuro status   Airway/Respiratory: Uneventful            Sign Out: Acceptable/Baseline resp. status   CV/Hemodynamics: Uneventful            Sign Out: Acceptable CV status; No obvious hypovolemia; No obvious fluid overload   Other NRE:    DID A NON-ROUTINE EVENT OCCUR?            Last vitals:  Vitals Value Taken Time   /69 02/26/24 1107   Temp     Pulse 80 02/26/24 1107   Resp 16 02/26/24 1107   SpO2 97 % 02/26/24 1107   Vitals shown include unfiled device data.    Electronically Signed By: LEXI Kitchen CRNA  February 26, 2024  11:09 AM

## 2024-03-21 PROBLEM — M25.551 HIP PAIN, RIGHT: Status: RESOLVED | Noted: 2023-12-05 | Resolved: 2024-03-21

## 2024-03-21 NOTE — PROGRESS NOTES
Physical Therapy Discharge Summary    Rangel Singh has completed the ordered physical therapy evaluation. Treatment recommendations were made, but pt has not returned for any further recommended PT sessions past the initial evaluation.  Pt was unable to be re-assessed, and present status is unknown.    Please contact me with any questions or concerns.  Thank you for your referral.    Marisa Pacheco  PT, DPT   3/21/2024  [place] [address]  [email or web address]  Voicemail: [phone]

## 2024-05-25 ENCOUNTER — HEALTH MAINTENANCE LETTER (OUTPATIENT)
Age: 59
End: 2024-05-25

## 2024-08-16 ENCOUNTER — TRANSFERRED RECORDS (OUTPATIENT)
Dept: MULTI SPECIALTY CLINIC | Facility: CLINIC | Age: 59
End: 2024-08-16

## 2024-08-16 LAB — RETINOPATHY: NEGATIVE

## 2024-10-24 ENCOUNTER — APPOINTMENT (OUTPATIENT)
Dept: MRI IMAGING | Facility: CLINIC | Age: 59
End: 2024-10-24
Attending: EMERGENCY MEDICINE
Payer: COMMERCIAL

## 2024-10-24 ENCOUNTER — OFFICE VISIT (OUTPATIENT)
Dept: URGENT CARE | Facility: URGENT CARE | Age: 59
End: 2024-10-24
Payer: COMMERCIAL

## 2024-10-24 ENCOUNTER — HOSPITAL ENCOUNTER (EMERGENCY)
Facility: CLINIC | Age: 59
Discharge: HOME | End: 2024-10-24
Attending: EMERGENCY MEDICINE | Admitting: EMERGENCY MEDICINE
Payer: COMMERCIAL

## 2024-10-24 VITALS
TEMPERATURE: 100.4 F | WEIGHT: 181 LBS | DIASTOLIC BLOOD PRESSURE: 62 MMHG | HEART RATE: 74 BPM | OXYGEN SATURATION: 99 % | RESPIRATION RATE: 16 BRPM | SYSTOLIC BLOOD PRESSURE: 135 MMHG | BODY MASS INDEX: 25.97 KG/M2

## 2024-10-24 VITALS
DIASTOLIC BLOOD PRESSURE: 77 MMHG | WEIGHT: 181 LBS | OXYGEN SATURATION: 99 % | TEMPERATURE: 98.9 F | BODY MASS INDEX: 25.34 KG/M2 | RESPIRATION RATE: 20 BRPM | HEIGHT: 71 IN | HEART RATE: 72 BPM | SYSTOLIC BLOOD PRESSURE: 144 MMHG

## 2024-10-24 DIAGNOSIS — D70.9 FEBRILE NEUTROPENIA (H): ICD-10-CM

## 2024-10-24 DIAGNOSIS — Z91.89 AT HIGH RISK FOR TICK BORNE ILLNESS: ICD-10-CM

## 2024-10-24 DIAGNOSIS — R55 SYNCOPE, UNSPECIFIED SYNCOPE TYPE: Primary | ICD-10-CM

## 2024-10-24 DIAGNOSIS — D69.6 LOW PLATELET COUNT (H): ICD-10-CM

## 2024-10-24 DIAGNOSIS — M79.10 GENERALIZED MUSCLE ACHE: ICD-10-CM

## 2024-10-24 DIAGNOSIS — R53.1 GENERALIZED WEAKNESS: ICD-10-CM

## 2024-10-24 DIAGNOSIS — R50.81 FEBRILE NEUTROPENIA (H): ICD-10-CM

## 2024-10-24 LAB
ALBUMIN SERPL BCG-MCNC: 3.6 G/DL (ref 3.5–5.2)
ALBUMIN UR-MCNC: 30 MG/DL
ALP SERPL-CCNC: 87 U/L (ref 40–150)
ALT SERPL W P-5'-P-CCNC: 103 U/L (ref 0–70)
ANION GAP SERPL CALCULATED.3IONS-SCNC: 12 MMOL/L (ref 7–15)
APPEARANCE UR: ABNORMAL
AST SERPL W P-5'-P-CCNC: 131 U/L (ref 0–45)
BASOPHILS # BLD AUTO: 0 10E3/UL (ref 0–0.2)
BASOPHILS NFR BLD AUTO: 1 %
BILIRUB SERPL-MCNC: 0.6 MG/DL
BILIRUB UR QL STRIP: NEGATIVE
BUN SERPL-MCNC: 17 MG/DL (ref 8–23)
CALCIUM SERPL-MCNC: 8.3 MG/DL (ref 8.8–10.4)
CHLORIDE SERPL-SCNC: 100 MMOL/L (ref 98–107)
COLOR UR AUTO: YELLOW
CREAT SERPL-MCNC: 0.8 MG/DL (ref 0.67–1.17)
EGFRCR SERPLBLD CKD-EPI 2021: >90 ML/MIN/1.73M2
EOSINOPHIL # BLD AUTO: 0 10E3/UL (ref 0–0.7)
EOSINOPHIL NFR BLD AUTO: 0 %
ERYTHROCYTE [DISTWIDTH] IN BLOOD BY AUTOMATED COUNT: 12.6 % (ref 10–15)
FLUAV RNA SPEC QL NAA+PROBE: NEGATIVE
FLUBV RNA RESP QL NAA+PROBE: NEGATIVE
GLUCOSE SERPL-MCNC: 161 MG/DL (ref 70–99)
GLUCOSE UR STRIP-MCNC: NEGATIVE MG/DL
HCO3 SERPL-SCNC: 24 MMOL/L (ref 22–29)
HCT VFR BLD AUTO: 42.6 % (ref 40–53)
HGB BLD-MCNC: 14.4 G/DL (ref 13.3–17.7)
HGB UR QL STRIP: NEGATIVE
IMM GRANULOCYTES # BLD: 0 10E3/UL
IMM GRANULOCYTES NFR BLD: 1 %
KETONES UR STRIP-MCNC: 20 MG/DL
LACTATE SERPL-SCNC: 0.8 MMOL/L (ref 0.7–2)
LEUKOCYTE ESTERASE UR QL STRIP: NEGATIVE
LIPASE SERPL-CCNC: 36 U/L (ref 13–60)
LYMPHOCYTES # BLD AUTO: 0.3 10E3/UL (ref 0.8–5.3)
LYMPHOCYTES NFR BLD AUTO: 25 %
MCH RBC QN AUTO: 31.4 PG (ref 26.5–33)
MCHC RBC AUTO-ENTMCNC: 33.8 G/DL (ref 31.5–36.5)
MCV RBC AUTO: 93 FL (ref 78–100)
MONOCYTES # BLD AUTO: 0.2 10E3/UL (ref 0–1.3)
MONOCYTES NFR BLD AUTO: 14 %
MUCOUS THREADS #/AREA URNS LPF: PRESENT /LPF
NEUTROPHILS # BLD AUTO: 0.8 10E3/UL (ref 1.6–8.3)
NEUTROPHILS NFR BLD AUTO: 60 %
NITRATE UR QL: NEGATIVE
NRBC # BLD AUTO: 0 10E3/UL
NRBC BLD AUTO-RTO: 0 /100
PH UR STRIP: 5 [PH] (ref 5–7)
PLATELET # BLD AUTO: 75 10E3/UL (ref 150–450)
POTASSIUM SERPL-SCNC: 4.2 MMOL/L (ref 3.4–5.3)
PROT SERPL-MCNC: 6.1 G/DL (ref 6.4–8.3)
RBC # BLD AUTO: 4.58 10E6/UL (ref 4.4–5.9)
RBC URINE: 1 /HPF
RSV RNA SPEC NAA+PROBE: NEGATIVE
SARS-COV-2 RNA RESP QL NAA+PROBE: NEGATIVE
SODIUM SERPL-SCNC: 136 MMOL/L (ref 135–145)
SP GR UR STRIP: 1.02 (ref 1–1.03)
TROPONIN T SERPL HS-MCNC: 15 NG/L
TROPONIN T SERPL HS-MCNC: 15 NG/L
UROBILINOGEN UR STRIP-MCNC: NORMAL MG/DL
WBC # BLD AUTO: 1.4 10E3/UL (ref 4–11)
WBC URINE: 2 /HPF

## 2024-10-24 PROCEDURE — 99285 EMERGENCY DEPT VISIT HI MDM: CPT | Mod: 25 | Performed by: EMERGENCY MEDICINE

## 2024-10-24 PROCEDURE — 93005 ELECTROCARDIOGRAM TRACING: CPT | Performed by: EMERGENCY MEDICINE

## 2024-10-24 PROCEDURE — 86618 LYME DISEASE ANTIBODY: CPT | Performed by: EMERGENCY MEDICINE

## 2024-10-24 PROCEDURE — 84484 ASSAY OF TROPONIN QUANT: CPT | Performed by: EMERGENCY MEDICINE

## 2024-10-24 PROCEDURE — 80053 COMPREHEN METABOLIC PANEL: CPT | Performed by: EMERGENCY MEDICINE

## 2024-10-24 PROCEDURE — 96361 HYDRATE IV INFUSION ADD-ON: CPT | Performed by: EMERGENCY MEDICINE

## 2024-10-24 PROCEDURE — 87637 SARSCOV2&INF A&B&RSV AMP PRB: CPT | Performed by: EMERGENCY MEDICINE

## 2024-10-24 PROCEDURE — 96360 HYDRATION IV INFUSION INIT: CPT | Mod: 59 | Performed by: EMERGENCY MEDICINE

## 2024-10-24 PROCEDURE — 36415 COLL VENOUS BLD VENIPUNCTURE: CPT | Performed by: EMERGENCY MEDICINE

## 2024-10-24 PROCEDURE — 250N000013 HC RX MED GY IP 250 OP 250 PS 637: Performed by: EMERGENCY MEDICINE

## 2024-10-24 PROCEDURE — 83605 ASSAY OF LACTIC ACID: CPT | Performed by: EMERGENCY MEDICINE

## 2024-10-24 PROCEDURE — 258N000003 HC RX IP 258 OP 636: Performed by: EMERGENCY MEDICINE

## 2024-10-24 PROCEDURE — 81001 URINALYSIS AUTO W/SCOPE: CPT | Performed by: EMERGENCY MEDICINE

## 2024-10-24 PROCEDURE — 83690 ASSAY OF LIPASE: CPT | Performed by: EMERGENCY MEDICINE

## 2024-10-24 PROCEDURE — 70553 MRI BRAIN STEM W/O & W/DYE: CPT

## 2024-10-24 PROCEDURE — 87040 BLOOD CULTURE FOR BACTERIA: CPT | Performed by: EMERGENCY MEDICINE

## 2024-10-24 PROCEDURE — 99213 OFFICE O/P EST LOW 20 MIN: CPT

## 2024-10-24 PROCEDURE — 87798 DETECT AGENT NOS DNA AMP: CPT | Performed by: EMERGENCY MEDICINE

## 2024-10-24 PROCEDURE — A9585 GADOBUTROL INJECTION: HCPCS | Performed by: EMERGENCY MEDICINE

## 2024-10-24 PROCEDURE — 85025 COMPLETE CBC W/AUTO DIFF WBC: CPT | Performed by: EMERGENCY MEDICINE

## 2024-10-24 PROCEDURE — 255N000002 HC RX 255 OP 636: Performed by: EMERGENCY MEDICINE

## 2024-10-24 RX ORDER — GADOBUTROL 604.72 MG/ML
8 INJECTION INTRAVENOUS ONCE
Status: COMPLETED | OUTPATIENT
Start: 2024-10-24 | End: 2024-10-24

## 2024-10-24 RX ORDER — ACETAMINOPHEN 500 MG
1000 TABLET ORAL ONCE
Status: COMPLETED | OUTPATIENT
Start: 2024-10-24 | End: 2024-10-24

## 2024-10-24 RX ORDER — DOXYCYCLINE 100 MG/1
100 CAPSULE ORAL 2 TIMES DAILY
Qty: 20 CAPSULE | Refills: 0 | Status: SHIPPED | OUTPATIENT
Start: 2024-10-24 | End: 2024-11-03

## 2024-10-24 RX ADMIN — GADOBUTROL 8 ML: 604.72 INJECTION INTRAVENOUS at 17:49

## 2024-10-24 RX ADMIN — ACETAMINOPHEN 1000 MG: 500 TABLET ORAL at 12:35

## 2024-10-24 RX ADMIN — SODIUM CHLORIDE 1000 ML: 9 INJECTION, SOLUTION INTRAVENOUS at 16:11

## 2024-10-24 ASSESSMENT — ACTIVITIES OF DAILY LIVING (ADL)
ADLS_ACUITY_SCORE: 0

## 2024-10-24 ASSESSMENT — COLUMBIA-SUICIDE SEVERITY RATING SCALE - C-SSRS
1. IN THE PAST MONTH, HAVE YOU WISHED YOU WERE DEAD OR WISHED YOU COULD GO TO SLEEP AND NOT WAKE UP?: NO
6. HAVE YOU EVER DONE ANYTHING, STARTED TO DO ANYTHING, OR PREPARED TO DO ANYTHING TO END YOUR LIFE?: NO
2. HAVE YOU ACTUALLY HAD ANY THOUGHTS OF KILLING YOURSELF IN THE PAST MONTH?: NO

## 2024-10-24 NOTE — ED NOTES
Pt reported having loose stools, chills that started 2 hours PTA. Pt denied abd pain, CP, SOB, dizziness, recent illness.

## 2024-10-24 NOTE — ED PROVIDER NOTES
History     Chief Complaint   Patient presents with    Generalized Body Aches    Fever     HPI  Rangel Singh is a 59 year old male past medical history significant for type 1 diabetes hyperlipidemia lumbar back pain with radiculopathy spondylolisthesis who presents to the emergency department complaining of generalized bodyaches and fever.  Patient states she helped move house of a relative over the weekend and Sunday was feeling tired he developed body aches and fevers on Monday and Tuesday was feeling better but the last few days has been having generalized weakness low-grade fevers lightheadedness and due to generalized weakness went down to the ground a couple times.  He did not completely lose consciousness but is feeling generalized weak denies any severe headaches has not any vomiting denies neck pain has not had chest pain or shortness of breath denies any abdominal pain or back pain denies any bowel or bladder dysfunction.  Has not had any calf pain leg swelling or focal numbness weakness in extremity.  States he did find a tick on him on Sunday with no rash present.  Was on his right leg denies rashes anywhere.    Allergies:  Allergies   Allergen Reactions    Nkda [No Known Drug Allergy]        Problem List:    Patient Active Problem List    Diagnosis Date Noted    Arthritis of right hip 12/05/2023     Priority: Medium    Right hip impingement syndrome 12/05/2023     Priority: Medium    Herniated lumbar intervertebral disc 01/09/2023     Priority: Medium    Spondylolisthesis, grade 1 01/09/2023     Priority: Medium    Lumbar back pain with radiculopathy affecting right lower extremity 11/28/2022     Priority: Medium    Lumbar radiculopathy 11/28/2022     Priority: Medium    Type 1 diabetes mellitus with hypoglycemia and without coma (H) 11/21/2017     Priority: Medium     Hypoglycemia unawareness.  Uses continuous glucose monitor (Dexcom) for this purpose      Adhesive capsulitis of shoulder, left  04/04/2016     Priority: Medium     Follows with [facility] Dr. 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 Insulin started 1999. Cpeptide 0.5 2004. Glutamic acid decarboylase antibody 1.1 12/05 (normal <1.45). No peripheral neuropathy. 11/3/09 Mild to moderate non-proliferative retinopathy.  2016, has mild background retinopathy  Has DEXCOM      Hyperlipidemia LDL goal <100 10/31/2010     Priority: Medium    LVH (left ventricular hypertrophy) 01/08/2009     Priority: Medium     Echo 1/09- borderline right ventricular hypertrophy,  borderline concentric left ventricular hypertrophy          Past Medical History:    Past Medical History:   Diagnosis Date    CELLULITIS NOS     Facial cellulitis 08/11/2014    Type 1 diabetes mellitus (H)        Past Surgical History:    Past Surgical History:   Procedure Laterality Date    COLONOSCOPY  1/3/2014    Procedure: COMBINED COLONOSCOPY, SINGLE BIOPSY/POLYPECTOMY BY BIOPSY;  Colonoscopy;  Surgeon: Ed Galvez MD;  Location: [facility]    COLONOSCOPY N/A 2/26/2024    Procedure: Colonoscopy;  Surgeon: Jose F Vance MD;  Location: [facility]    DISCECTOMY LUMBAR POSTERIOR MICROSCOPIC ONE LEVEL Right 2/17/2023    Procedure: Right Lumbar 4-5 Microdiscectomy;  Surgeon: Stephan Fregoso MD;  Location:  OR    EXAM UNDER ANESTHESIA, MANIPULATE JOINT (LOCATION) Left 3/30/2016    Procedure: EXAM UNDER ANESTHESIA, MANIPULATE JOINT (LOCATION);  Surgeon: Justen Ford MD;  Location: [facility]    SURGICAL HISTORY OF -   08/97    appendectomy ([place], [place])        Family History:    Family History   Problem Relation Age of Onset    C.A.D. Father         50s     Hypertension Father     Atrial fibrillation Father     Hypertension Mother      "Cerebrovascular Disease Mother     Atrial fibrillation Mother     Cancer Maternal Grandfather         ?throat/lung cancer    Cerebrovascular Disease Maternal Grandmother     Arthritis Sister         RA    ADRIANA Brother     Atrial fibrillation Brother     Circulatory Other         2 cousins on maternal side .w anurysms    Cancer - colorectal No family hx of     Diabetes No family hx of        Social History:  Marital Status:   [2]  Social History     Tobacco Use    Smoking status: Never    Smokeless tobacco: Never   Vaping Use    Vaping status: Never Used   Substance Use Topics    Alcohol use: Yes     Comment: occ. 2-3 times a month    Drug use: No        Medications:    ASPIRIN 81 MG OR TABS  blood glucose (NO BRAND SPECIFIED) test strip  Cholecalciferol (VITAMIN D) 50 MCG (2000 UT) CAPS  ezetimibe (ZETIA) 10 MG tablet  glucagon (GLUCAGON EMERGENCY) 1 MG kit  insulin glargine (LANTUS VIAL) 100 UNIT/ML vial  insulin lispro (HUMALOG) 100 UNIT/ML vial  insulin syringe-needle U-100 (BD VEO INSULIN SYRINGE U/F) 31G X 15/64\" 0.3 ML  insulin syringes, disposable, U-100 0.3 ML MISC  lisinopril (PRINIVIL/ZESTRIL) 10 MG tablet  order for DME  rosuvastatin (CRESTOR) 40 MG tablet  TRUEPLUS LANCETS 33G MISC          Review of Systems  Per HPI  Physical Exam   BP: (!) 143/72  Pulse: 75  Temp: (!) 100.7  F (38.2  C)  Resp: 20  Height: 180.3 cm (5' 11\")  Weight: 82.1 kg (181 lb)  SpO2: 99 %      Physical Exam  Vitals and nursing note reviewed.   Constitutional:       General: He is not in acute distress.     Appearance: Normal appearance. He is not ill-appearing, toxic-appearing or diaphoretic.   HENT:      Head: Normocephalic and atraumatic.      Nose: Nose normal.      Mouth/Throat:      Mouth: Mucous membranes are moist.      Pharynx: Oropharynx is clear.   Eyes:      Conjunctiva/sclera: Conjunctivae normal.   Cardiovascular:      Rate and Rhythm: Normal rate and regular rhythm.      Pulses: Normal pulses.      Heart " sounds: Normal heart sounds.   Pulmonary:      Effort: Pulmonary effort is normal.      Breath sounds: Normal breath sounds. No stridor. No wheezing or rhonchi.   Abdominal:      General: Abdomen is flat. There is no distension.      Palpations: Abdomen is soft.      Tenderness: There is no abdominal tenderness. There is no right CVA tenderness or left CVA tenderness.   Musculoskeletal:         General: No swelling or tenderness. Normal range of motion.      Cervical back: Normal range of motion and neck supple.      Right lower leg: No edema.      Left lower leg: No edema.   Skin:     General: Skin is warm and dry.      Findings: No rash.   Neurological:      General: No focal deficit present.      Mental Status: He is alert and oriented to person, place, and time.      Motor: No weakness.      Coordination: Coordination normal.   Psychiatric:         Mood and Affect: Mood normal.         ED Course        Procedures              EKG Interpretation:      Interpreted by Harpreet Corey MD  Rhythm: normal sinus   Rate: Normal  Axis: Left Axis Deviation  Ectopy: none  Conduction: left bundle branch block (complete)  ST Segments/ T Waves: Non-specific ST-T wave changes  Q Waves: anterior leads  Comparison to prior: Unchanged from 2/17/23    Clinical Impression: Normal sinus rhythm with left axis deviation complete left bundle branch block and nonspecific ST-T wave abnormalities.            Critical Care time:  none              Results for orders placed or performed during the hospital encounter of 10/24/24 (from the past 24 hours)   Symptomatic Influenza A/B, RSV, & SARS-CoV2 PCR (COVID-19) Nose    Specimen: Nose; Swab   Result Value Ref Range    Influenza A PCR Negative Negative    Influenza B PCR Negative Negative    RSV PCR Negative Negative    SARS CoV2 PCR Negative Negative    Narrative    Testing was performed using the Xpert Xpress CoV2/Flu/RSV Assay on the Cepheid GeneXpert Instrument. This test should be  ordered for the detection of SARS-CoV2, influenza, and RSV viruses in individuals with signs and symptoms of respiratory tract infection. This test is for in vitro diagnostic use under the [place] FDA for laboratories certified under CLIA to perform high or moderate complexity testing. This test has been [place] FDA cleared. A negative result does not rule out the presence of PCR inhibitors in the specimen or target RNA in concentration below the limit of detection for the assay. If only one viral target is positive but coinfection with multiple targets is suspected, the sample should be re-tested with another FDA cleared, approved, or authorized test, if coninfection would change clinical management. This test was validated by the [facility]. These laboratories are certified under the Clinical Laboratory Improvement Amendments of 1988 (CLIA-88) as qualified to perfom high complexity laboratory testing.   CBC with platelets differential    Narrative    The following orders were created for panel order CBC with platelets differential.  Procedure                               Abnormality         Status                     ---------                               -----------         ------                     CBC with platelets and d...[[id number]]  Abnormal            Final result                 Please view results for these tests on the individual orders.   CBC with platelets and differential   Result Value Ref Range    WBC Count 1.4 (L) 4.0 - 11.0 10e3/uL    RBC Count 4.58 4.40 - 5.90 10e6/uL    Hemoglobin 14.4 13.3 - 17.7 g/dL    Hematocrit 42.6 40.0 - 53.0 %    MCV 93 78 - 100 fL    MCH 31.4 26.5 - 33.0 pg    MCHC 33.8 31.5 - 36.5 g/dL    RDW 12.6 10.0 - 15.0 %    Platelet Count 75 (L) 150 - 450 10e3/uL    % Neutrophils 60 %    % Lymphocytes 25 %    % Monocytes 14 %    % Eosinophils 0 %    % Basophils 1 %    % Immature Granulocytes 1 %    NRBCs per 100 WBC 0 <1 /100    Absolute Neutrophils 0.8 (L) 1.6 -  8.3 10e3/uL    Absolute Lymphocytes 0.3 (L) 0.8 - 5.3 10e3/uL    Absolute Monocytes 0.2 0.0 - 1.3 10e3/uL    Absolute Eosinophils 0.0 0.0 - 0.7 10e3/uL    Absolute Basophils 0.0 0.0 - 0.2 10e3/uL    Absolute Immature Granulocytes 0.0 <=0.4 10e3/uL    Absolute NRBCs 0.0 10e3/uL   Lactic acid whole blood   Result Value Ref Range    Lactic Acid 0.8 0.7 - 2.0 mmol/L       Medications   sodium chloride 0.9% BOLUS 1,000 mL (has no administration in time range)   acetaminophen (TYLENOL) tablet 1,000 mg (1,000 mg Oral $Given 10/24/24 1235)       Assessments & Plan (with Medical Decision Making) records were reviewed including past medical history medications and allergies.  Office visit from earlier today was reviewed.  Office visit on 8/22/2024 for type 1 diabetes was reviewed.  Patient was given IV fluids and Tylenol.  EKG revealed a normal sinus rhythm with left bundle branch block and left axis.  This is similar to previous EKG on 2/17/2023.  Labs were obtained.  I independently reviewed and interpreted labs.  Due to patient's presentation blood cultures were also obtained.  Patient's white count was low at 1.4 with a hemoglobin of 14.4 and a platelet count had 75.  Both absolute neutrophil and lymphocyte counts were low.  Comprehensive metabolic panel significant for a glucose of 161.  AST and ALT were mildly elevated at 131 and 103.  UA was unremarkable.  Lactic acid was 0.8.  Initial troponin was 15 repeat troponin 2 hours later was also 15.  Due to patient's possible syncopal event and weakness a MRI of the brain was obtained.  I independently reviewed this and agree with radiologist findings of no obvious acute abnormality.  Due to patient's history of tick bite and both neutropenia and thrombocytopenia there is a significant concern for a tickborne illness.  I discussed this with patient and his wife he feels comfortable going home at this time.  I am going to treat the patient with doxycycline and await for  tickborne cultures.  If these are negative I have written for patient to return to the emergency department/urgent care for recheck of his CBC and liver functions.  Patient agrees with this plan.  If worsening symptoms including further altered mental status weakness fevers not controlled with ibuprofen or Tylenol or other symptoms present he should immediately return for recheck.  Patient feels comfortable with this plan at this time.    Addendum: At time of dictation labs revealed a positive anaplasmosis.  Patient was contacted and I discussed the case with his wife he is feeling much better at this time.  He has an appointment on Tuesday with his primary care and I feel this is reasonable to follow-up at that time.  I advised that if worsening symptoms patient should return sooner to urgent care or ED to have the labs rechecked.  Wife confirms this plan.     I have reviewed the nursing notes.    I have reviewed the findings, diagnosis, plan and need for follow up with the patient.             Discharge Medication List as of 10/24/2024  8:01 PM        START taking these medications    Details   doxycycline hyclate (VIBRAMYCIN) 100 MG capsule Take 1 capsule (100 mg) by mouth 2 times daily for 10 days., Disp-20 capsule, R-0, E-Prescribe             Final diagnoses:   Generalized weakness   Generalized muscle ache   At high risk for tick borne illness   Febrile neutropenia (H)   Low platelet count (H)       10/24/2024   [facility] EMERGENCY DEPT       Harpreet Corey MD  10/25/24 5483

## 2024-10-24 NOTE — ED TRIAGE NOTES
Triage Assessment (Adult)       Row Name 10/24/24 1228          Triage Assessment    Airway WDL WDL        Cardiac WDL    Cardiac WDL WDL        Cognitive/Neuro/Behavioral WDL    Cognitive/Neuro/Behavioral WDL WDL

## 2024-10-24 NOTE — ED TRIAGE NOTES
"Pt presents to ED with c/o extreme body aches, frequent falls, diarrhea, reports balance has been off for past 3 days. Was seen in [facility] and advised to be evaluated in ED. Pt reports hx of DM I, reports sugars have been \"terrible\". States BG has been elevated.         "

## 2024-10-24 NOTE — PROGRESS NOTES
"URGENT CARE  Assessment & Plan   Assessment:   Rangel Singh is a 59 year old male who's clinical presentation today is consistent with:   1. Syncope, unspecified syncope type (Primary)  Plan:  Sending patient to the ED for higher level of care, patient is a type one diabetic who has had a fever for 4-5 days, who endorses he got dizzy/lightheaded and passed out last night. Patient endorses generalized weakness and I suspect dehydration, I feel he needs to be evaluated at a higher level of care for a comprehensive evaluation to delineate specific diagnosis and possibly get IV rehydration. Patient's wife can drive him.          LEXI Moran [facility] [place]      ______________________________________________________________________      Subjective     HPI: Rangel Singh \"Ray\"} is a 59 year old  male who presents today for evaluation the following concerns:   Patient presents today endorsing he has had a fever for 5 days. He endorses shaking chills, body aches, weakness. He states he is a type one diabetic. He states he got dizzy, off balance and \"fell over\" last night. Patient states he laid there awhile as it was difficult to get up. Patient endorses he is drinking water but not eating. Endorses urine is dark yellow. States he did a covid which was negative   Denies a cough, nasal congestion    Review of Systems:  Pertinent review of systems as reflected in HPI, otherwise negative.     Objective    Physical Exam:  Vitals:    10/24/24 1102   BP: 135/62   Pulse: 74   Resp: 16   Temp: 100.4  F (38  C)   TempSrc: Tympanic   SpO2: 99%   Weight: 82.1 kg (181 lb)      General: Alert and oriented, no acute distress, Vital signs reviewed: fever noted,  normotensive   Psy/mental status: Cooperative, nonanxious  SKIN: Intact, no rashes  EYES: EOMs intact, PERRLA bilaterally   Conjunctiva: Clear bilaterally, no injection or erythema present  EARS: TMs intact, translucent gray in color " with normal landmarks present no erythema  or bulging tympanic membrane   Canals are without swelling, however have a mild amount of cerumen, no impaction  NOSE:  mucosa moist               No frontal or maxillary sinus tenderness present bilaterally  MOUTH/THROAT: lips, tongue, & oral mucosa appear normal upon inspection                Posterior oropharynx is erythematous but without exudate, lesions or tonsillar  Edema, no dysphonia, no unilateral tonsillar edema, no uvular deviation,   no signs of peritonsillar abscess  NECK: supple, has full range of motion with no meningeal signs              No lymphadenopathy present  LUNG: normal work of breathing, good respiratory effort without retractions, good air  movement, non labored, inspection reveals normal chest expansion w/  inspiration            Lung sounds are clear to auscultation bilaterally,            No rales/rhonic/crackles wheezing noted           No cough noted       ______________________________________________________________________    I explained my diagnostic considerations and recommendations to the patient, who voiced understanding and agreement with the treatment plan.   All questions were answered.   We discussed potential side effects, risks and benefits of any prescribed or recommended therapies, as well as expectations for response to treatments.  Please see AVS for any patient instructions & handouts given.   Patient was advised to contact the Nurse Care Line, their Primary Care provider, Urgent Care, or the Emergency Department if there are new or worsening symptoms, or call 911 for emergencies.

## 2024-10-25 ENCOUNTER — TELEPHONE (OUTPATIENT)
Dept: NURSING | Facility: CLINIC | Age: 59
End: 2024-10-25
Payer: COMMERCIAL

## 2024-10-25 LAB
A PHAGOCYTOPH DNA BLD QL NAA+PROBE: DETECTED
B BURGDOR IGG+IGM SER QL: 0.12
BABESIA DNA BLD QL NAA+PROBE: NOT DETECTED
EHRLICHIA DNA SPEC QL NAA+PROBE: NOT DETECTED

## 2024-10-25 NOTE — PATIENT INSTRUCTIONS
1.  If you want to look into 1/2 unit syringes, let Dr. Ji know, I can order over the phone.  2. Return to clinic in Nov, fasting blood work prior.  If need to do non-fasting, that's ok too  3. Make appointment for shingles shot after your race.   maximum assist (25% patients effort)

## 2024-10-25 NOTE — TELEPHONE ENCOUNTER
[facility]    Reason for call: Lab Result Notification     Lab Result (including Rx patient on, if applicable).  If culture, copy of lab report at bottom.  Lab Result: Tick borne illnesses    Component      Latest Ref Rng 10/24/2024  4:15 PM   Anaplasma Phagocytophilum      Not Detected  Detected !    Babesia Species by PCR      Not Detected  Not Detected    Ehrlichia species by PCR      Not Detected  Not Detected       Legend:  ! Abnormal    ED Rx: doxycycline hyclate (VIBRAMYCIN) 100 MG capsule - Take 1 capsule (100 mg) by mouth 2 times daily for 10 days     Creatinine Level (mg/dl)   Creatinine   Date Value Ref Range Status   10/24/2024 0.80 0.67 - 1.17 mg/dL Final   01/04/2021 0.81 0.72 - 1.25 mg/dL Final    Creatinine clearance (ml/min), if applicable    Serum creatinine: 0.8 mg/dL 10/24/24 1445  Estimated creatinine clearance: 112.6 mL/min     Patient presented to [place] ED on 10/24/2024 complaining of generalized body aches and fever    RN Recommendations/Instructions per [place] ED lab result protocol:   [facility] ED lab result protocol utilized: tick bourne illnesses panel      Patient's current Symptoms:   Spoke to patient and reviewed tick panel results with them. Patient states symptoms have improved, but still feeling body aches. Advised patient continue doxycycline as prescribed until gone. Return precautions reviewed. Patient verbalized understanding and agreement.    Patient/care giver notified to contact your PCP clinic or return to the Emergency department if your:  Symptoms return.  Symptoms do not improve after 3 days on antibiotic.  Symptoms do not resolve after completing antibiotic.  Symptoms worsen or other concerning symptoms.     Meghana Perez RN

## 2024-10-29 LAB — BACTERIA BLD CULT: NO GROWTH

## 2024-11-12 ENCOUNTER — MYC MEDICAL ADVICE (OUTPATIENT)
Dept: FAMILY MEDICINE | Facility: CLINIC | Age: 59
End: 2024-11-12
Payer: COMMERCIAL

## 2024-11-12 DIAGNOSIS — R97.20 ELEVATED PROSTATE SPECIFIC ANTIGEN (PSA): Primary | ICD-10-CM

## 2024-11-13 ENCOUNTER — DOCUMENTATION ONLY (OUTPATIENT)
Dept: FAMILY MEDICINE | Facility: CLINIC | Age: 59
End: 2024-11-13
Payer: COMMERCIAL

## 2024-11-13 DIAGNOSIS — E10.649 TYPE 1 DIABETES MELLITUS WITH HYPOGLYCEMIA AND WITHOUT COMA (H): Primary | ICD-10-CM

## 2024-11-13 NOTE — PROGRESS NOTES
Rangel Singh has an upcoming lab appointment:    Future Appointments   Date Time Provider Department Center   11/19/2024 11:30 AM [facility]   11/20/2024  8:00 AM Juan Allen, DO [facility]     Patient is scheduled for the following lab(s): LAB    There is no order available. Please review and place either future orders or HMPO (Review of Health Maintenance Protocol Orders), as appropriate.    Health Maintenance Due   Topic    ANNUAL REVIEW OF HM ORDERS     MICROALBUMIN     LIPID     A1C      Tiki Garcia

## 2024-11-13 NOTE — TELEPHONE ENCOUNTER
Patient requesting to add a PSA lab for lab appointment on 11/19/2024.    Hannah RICKS RN  [facility]  [phone]

## 2024-11-15 SDOH — HEALTH STABILITY: PHYSICAL HEALTH: ON AVERAGE, HOW MANY DAYS PER WEEK DO YOU ENGAGE IN MODERATE TO STRENUOUS EXERCISE (LIKE A BRISK WALK)?: 6 DAYS

## 2024-11-15 SDOH — HEALTH STABILITY: PHYSICAL HEALTH: ON AVERAGE, HOW MANY MINUTES DO YOU ENGAGE IN EXERCISE AT THIS LEVEL?: 60 MIN

## 2024-11-15 ASSESSMENT — SOCIAL DETERMINANTS OF HEALTH (SDOH): HOW OFTEN DO YOU GET TOGETHER WITH FRIENDS OR RELATIVES?: PATIENT DECLINED

## 2024-11-19 ENCOUNTER — LAB (OUTPATIENT)
Dept: LAB | Facility: CLINIC | Age: 59
End: 2024-11-19
Payer: COMMERCIAL

## 2024-11-19 DIAGNOSIS — M79.10 GENERALIZED MUSCLE ACHE: ICD-10-CM

## 2024-11-19 DIAGNOSIS — A77.49 ANAPLASMOSIS: ICD-10-CM

## 2024-11-19 DIAGNOSIS — R74.8 ELEVATED LIVER ENZYMES: ICD-10-CM

## 2024-11-19 DIAGNOSIS — R53.1 GENERALIZED WEAKNESS: ICD-10-CM

## 2024-11-19 DIAGNOSIS — Z91.89 AT HIGH RISK FOR TICK BORNE ILLNESS: ICD-10-CM

## 2024-11-19 DIAGNOSIS — R97.20 ELEVATED PROSTATE SPECIFIC ANTIGEN (PSA): ICD-10-CM

## 2024-11-19 DIAGNOSIS — E10.649 TYPE 1 DIABETES MELLITUS WITH HYPOGLYCEMIA AND WITHOUT COMA (H): ICD-10-CM

## 2024-11-19 LAB
CREAT UR-MCNC: 81.9 MG/DL
ERYTHROCYTE [DISTWIDTH] IN BLOOD BY AUTOMATED COUNT: 13.5 % (ref 10–15)
HCT VFR BLD AUTO: 42.2 % (ref 40–53)
HGB BLD-MCNC: 13.6 G/DL (ref 13.3–17.7)
MCH RBC QN AUTO: 30.6 PG (ref 26.5–33)
MCHC RBC AUTO-ENTMCNC: 32.2 G/DL (ref 31.5–36.5)
MCV RBC AUTO: 95 FL (ref 78–100)
MICROALBUMIN UR-MCNC: <12 MG/L
MICROALBUMIN/CREAT UR: NORMAL MG/G{CREAT}
PLATELET # BLD AUTO: 142 10E3/UL (ref 150–450)
PSA SERPL DL<=0.01 NG/ML-MCNC: 4.32 NG/ML (ref 0–4.5)
RBC # BLD AUTO: 4.45 10E6/UL (ref 4.4–5.9)
WBC # BLD AUTO: 3.7 10E3/UL (ref 4–11)

## 2024-11-19 PROCEDURE — 80053 COMPREHEN METABOLIC PANEL: CPT

## 2024-11-19 PROCEDURE — 82043 UR ALBUMIN QUANTITATIVE: CPT

## 2024-11-19 PROCEDURE — 85027 COMPLETE CBC AUTOMATED: CPT

## 2024-11-19 PROCEDURE — 36415 COLL VENOUS BLD VENIPUNCTURE: CPT

## 2024-11-19 PROCEDURE — 82570 ASSAY OF URINE CREATININE: CPT

## 2024-11-19 PROCEDURE — 84153 ASSAY OF PSA TOTAL: CPT

## 2024-11-20 ENCOUNTER — OFFICE VISIT (OUTPATIENT)
Dept: FAMILY MEDICINE | Facility: CLINIC | Age: 59
End: 2024-11-20
Attending: FAMILY MEDICINE
Payer: COMMERCIAL

## 2024-11-20 VITALS
HEART RATE: 62 BPM | DIASTOLIC BLOOD PRESSURE: 62 MMHG | BODY MASS INDEX: 25.2 KG/M2 | RESPIRATION RATE: 16 BRPM | WEIGHT: 180 LBS | TEMPERATURE: 97.2 F | HEIGHT: 71 IN | OXYGEN SATURATION: 96 % | SYSTOLIC BLOOD PRESSURE: 110 MMHG

## 2024-11-20 DIAGNOSIS — R74.8 ELEVATED LIVER ENZYMES: ICD-10-CM

## 2024-11-20 DIAGNOSIS — A77.49 ANAPLASMOSIS: ICD-10-CM

## 2024-11-20 DIAGNOSIS — E10.649 TYPE 1 DIABETES MELLITUS WITH HYPOGLYCEMIA AND WITHOUT COMA (H): ICD-10-CM

## 2024-11-20 DIAGNOSIS — D72.819 LEUKOPENIA, UNSPECIFIED TYPE: ICD-10-CM

## 2024-11-20 DIAGNOSIS — Z00.00 ROUTINE GENERAL MEDICAL EXAMINATION AT A HEALTH CARE FACILITY: Primary | ICD-10-CM

## 2024-11-20 DIAGNOSIS — R35.0 FREQUENT URINATION: ICD-10-CM

## 2024-11-20 DIAGNOSIS — R74.01 ELEVATED AST (SGOT): Primary | ICD-10-CM

## 2024-11-20 DIAGNOSIS — E78.5 HYPERLIPIDEMIA LDL GOAL <70: ICD-10-CM

## 2024-11-20 DIAGNOSIS — R39.9 LOWER URINARY TRACT SYMPTOMS (LUTS): ICD-10-CM

## 2024-11-20 LAB
ALBUMIN SERPL BCG-MCNC: 4.2 G/DL (ref 3.5–5.2)
ALBUMIN UR-MCNC: NEGATIVE MG/DL
ALP SERPL-CCNC: 113 U/L (ref 40–150)
ALT SERPL W P-5'-P-CCNC: 66 U/L (ref 0–70)
ANION GAP SERPL CALCULATED.3IONS-SCNC: 10 MMOL/L (ref 7–15)
APPEARANCE UR: CLEAR
AST SERPL W P-5'-P-CCNC: 50 U/L (ref 0–45)
BILIRUB SERPL-MCNC: 0.5 MG/DL
BILIRUB UR QL STRIP: NEGATIVE
BUN SERPL-MCNC: 21.7 MG/DL (ref 8–23)
CALCIUM SERPL-MCNC: 9.3 MG/DL (ref 8.8–10.4)
CHLORIDE SERPL-SCNC: 104 MMOL/L (ref 98–107)
COLOR UR AUTO: YELLOW
CREAT SERPL-MCNC: 0.83 MG/DL (ref 0.67–1.17)
EGFRCR SERPLBLD CKD-EPI 2021: >90 ML/MIN/1.73M2
GLUCOSE SERPL-MCNC: 102 MG/DL (ref 70–99)
GLUCOSE UR STRIP-MCNC: NEGATIVE MG/DL
HCO3 SERPL-SCNC: 26 MMOL/L (ref 22–29)
HGB UR QL STRIP: NEGATIVE
KETONES UR STRIP-MCNC: NEGATIVE MG/DL
LEUKOCYTE ESTERASE UR QL STRIP: NEGATIVE
NITRATE UR QL: NEGATIVE
PH UR STRIP: 6.5 [PH] (ref 5–7)
POTASSIUM SERPL-SCNC: 4.8 MMOL/L (ref 3.4–5.3)
PROT SERPL-MCNC: 6.6 G/DL (ref 6.4–8.3)
SODIUM SERPL-SCNC: 140 MMOL/L (ref 135–145)
SP GR UR STRIP: 1.02 (ref 1–1.03)
UROBILINOGEN UR STRIP-ACNC: 0.2 E.U./DL

## 2024-11-20 PROCEDURE — 81003 URINALYSIS AUTO W/O SCOPE: CPT | Performed by: FAMILY MEDICINE

## 2024-11-20 RX ORDER — ROSUVASTATIN CALCIUM 40 MG/1
40 TABLET, COATED ORAL DAILY
Qty: 90 TABLET | Refills: 3 | Status: SHIPPED | OUTPATIENT
Start: 2024-11-20

## 2024-11-20 RX ORDER — TAMSULOSIN HYDROCHLORIDE 0.4 MG/1
0.4 CAPSULE ORAL DAILY
Qty: 90 CAPSULE | Refills: 3 | Status: SHIPPED | OUTPATIENT
Start: 2024-11-20

## 2024-11-20 ASSESSMENT — PAIN SCALES - GENERAL: PAINLEVEL_OUTOF10: NO PAIN (0)

## 2024-11-20 NOTE — PROGRESS NOTES
"Preventive Care Visit  [facility]  Juan Allen DO, Family Medicine  Nov 20, 2024      Assessment & Plan     Routine general medical examination at a health care facility  Immunizations: covid and flu today   Cholesterol: rosuvastatin 40 mg daily. Zetia 10 mg daily   Diabetes: type 1 diabetic   Colon Cancer: colonoscopy 2/26/2024, repeat 10 years   Prostate: PSA 4.32 on 11/19/2024  Diet/Exercise: active.   Tobacco: non-user.     Type 1 diabetes mellitus with hypoglycemia and without coma (H)  Hyperlipidemia LDL goal <70  Follows with Endocrinology. Needs refill of cholesterol.   - rosuvastatin (CRESTOR) 40 MG tablet  Dispense: 90 tablet; Refill: 3    Frequent urination  Lower urinary tract symptoms (LUTS)  PSA WNL. Check UA. Start on tamsulosin 0.4 mg daily. Medication and possible side effects discussed.   - UA Macroscopic with reflex to Microscopic and Culture - Lab Collect    - tamsulosin (FLOMAX) 0.4 MG capsule  Dispense: 90 capsule; Refill: 3      Anaplasmosis  Treated with doxycycline. Doing well. Needs recheck of labs.   Leukopenia, unspecified type  Elevated liver enzymes  -- check CMP, recheck CBC in 1 month as still slightly low platelets and WBC but improving from prior.   - CBC with platelets      The risks, benefits and treatment options of prescribed medications or other treatments have been discussed with the patient. The patient verbalized their understanding and should call or follow up if no improvement or if they develop further problems.      Patient has been advised of split billing requirements and indicates understanding: Yes        BMI  Estimated body mass index is 25.46 kg/m  as calculated from the following:    Height as of this encounter: 1.791 m (5' 10.5\").    Weight as of this encounter: 81.6 kg (180 lb).   Weight management plan: Discussed healthy diet and exercise guidelines    Counseling  Appropriate preventive services were addressed with this patient " via screening, questionnaire, or discussion as appropriate for fall prevention, nutrition, physical activity, Tobacco-use cessation, social engagement, weight loss and cognition.  Checklist reviewing preventive services available has been given to the patient.  Reviewed patient's diet, addressing concerns and/or questions.         Danika Cody is a 59 year old, presenting for the following:  Physical, Insect Bites (Recheck labs after being treated for anaplasmosis ), and Medication Update (Ezetimibe(zetia) 10mg 1 tab once a day )        11/20/2024     7:42 AM   Additional Questions   Roomed by Dorinda JACKSON        Via the [employer] Maintenance questionnaire, the patient has reported the following services have been completed -Eye Exam: [employer] 2024-07-15, this information has been sent to the abstraction team.    HPI    Immunizations: covid and flu today   Cholesterol: rosuvastatin 40 mg daily. Zetia 10 mg daily   Diabetes: type 1 diabetic   Colon Cancer: colonoscopy 2/26/2024, repeat 10 years   Prostate: PSA 4.32 on 11/19/2024  Diet/Exercise: active.   Tobacco: non-user.     DMT1  Has been on insulin since year 2000   [facility] [place].   No HTN, no albuminuria       Recent bout of anaplasmosis   Needs recheck of labs.   Treated with doxycycline. Symptoms resolved.       Frequent urination   Reports urinating about every 1.5 hours.   Nocturia 1-2 times per night.   Recent PSA   No burning with urination   Not painful.  No blood in the urine.   No abdominal pain.   Interested in medication for this.       Health Care Directive  Patient does not have a Health Care Directive: Patient states has Advance Directive and will bring in a copy to clinic.      11/15/2024   General Health   How would you rate your overall physical health? Good   Feel stress (tense, anxious, or unable to sleep) Not at all            11/15/2024   Nutrition   Three or more servings  of calcium each day? Yes   Diet: Regular (no restrictions)   How many servings of fruit and vegetables per day? (!) 2-3   How many sweetened beverages each day? 0-1            11/15/2024   Exercise   Days per week of moderate/strenous exercise 6 days   Average minutes spent exercising at this level 60 min            11/15/2024   Social Factors   Frequency of gathering with friends or relatives Patient declined   Worry food won't last until get money to buy more No   Food not last or not have enough money for food? No   Do you have housing? (Housing is defined as stable permanent housing and does not include staying ouside in a car, in a tent, in an abandoned building, in an overnight shelter, or couch-surfing.) Yes   Are you worried about losing your housing? No   Lack of transportation? No   Unable to get utilities (heat,electricity)? No            11/15/2024   Fall Risk   Fallen 2 or more times in the past year? No    Trouble with walking or balance? No        Patient-reported          11/15/2024   Dental   Dentist two times every year? Yes            11/15/2024   TB Screening   Were you born outside of the [place]? No            Today's PHQ-2 Score:       11/20/2024     7:33 AM   PHQ-2 ( 1999 Pf[employer])   Q1: Little interest or pleasure in doing things 0    Q2: Feeling down, depressed or hopeless 0    PHQ-2 Score 0    Q1: Little interest or pleasure in doing things Not at all   Q2: Feeling down, depressed or hopeless Not at all   PHQ-2 Score 0       Patient-reported           11/15/2024   Substance Use   Alcohol more than 3/day or more than 7/wk No   Do you use any other substances recreationally? No        Social History     Tobacco Use    Smoking status: Never    Smokeless tobacco: Never   Vaping Use    Vaping status: Never Used   Substance Use Topics    Alcohol use: Yes     Comment: occ. 2-3 times a month    Drug use: No           11/15/2024   STI Screening   New sexual partner(s) since last STI/HIV test? No      Last  "PSA:   PSA   Date Value Ref Range Status   06/14/2021 3.09 0 - 4 ug/L Final     Comment:     Assay Method:  Chemiluminescence using Siemens Vista analyzer     PSA Tumor Marker   Date Value Ref Range Status   11/19/2024 4.32 0.00 - 4.50 ng/mL Final     ASCVD Risk   The 10-year ASCVD risk score (Kwame KELSEY, et al., 2019) is: 11.4%    Values used to calculate the score:      Age: 59 years      Sex: Male      Is Non- : No      Diabetic: Yes      Tobacco smoker: No      Systolic Blood Pressure: 110 mmHg      Is BP treated: No      HDL Cholesterol: 53 mg/dL      Total Cholesterol: 172 mg/dL         Reviewed and updated as needed this visit by Provider   Tobacco  Allergies  Meds  Problems  Med Hx  Surg Hx  Fam Hx                  Review of Systems  Constitutional, HEENT, cardiovascular, pulmonary, gi and gu systems are negative, except as otherwise noted.     Objective    Exam  /62   Pulse 62   Temp 97.2  F (36.2  C) (Tympanic)   Resp 16   Ht 1.791 m (5' 10.5\")   Wt 81.6 kg (180 lb)   SpO2 96%   BMI 25.46 kg/m     Estimated body mass index is 25.46 kg/m  as calculated from the following:    Height as of this encounter: 1.791 m (5' 10.5\").    Weight as of this encounter: 81.6 kg (180 lb).    Physical Exam  GENERAL: alert and no distress  EYES: Eyes grossly normal to inspection, PERRL and conjunctivae and sclerae normal  HENT: ear canals and TM's normal, nose and mouth without ulcers or lesions  NECK: no adenopathy, no asymmetry, masses, or scars  RESP: lungs clear to auscultation - no rales, rhonchi or wheezes  CV: regular rate and rhythm, normal S1 S2, no S3 or S4, no murmur, click or rub, no peripheral edema  ABDOMEN: soft, nontender, no hepatosplenomegaly, no masses and bowel sounds normal  MS: no gross musculoskeletal defects noted, no edema  SKIN: no suspicious lesions or rashes  NEURO: Normal strength and tone, mentation intact and speech normal  PSYCH: mentation " appears normal, affect normal/bright        Signed Electronically by: Juan Allen, DO

## 2024-11-20 NOTE — PATIENT INSTRUCTIONS
Patient Education       Rosuvastatin 40 mg daily refilled today.     Check urine study today.     Schedule CBC in 1 month for recheck.       Bladder irritants  Caffeine (eg, coffee, tea, chocolate, certain pain relievers, cold medications, and supplements)  Citrus fruits and juices  Carbonated beverages with or without caffeine (including sparkling mineral water)  Vitamin C supplements and large doses of vitamin B  Alcohol  Chili peppers and onions  Products containing aspartame or saccharin  Products containing vinegar  Spicy foods  Tomatoes and tomato-based foods      Start on tamsulosin 0.4 mg daily for the frequent urination.       Preventive Care Advice   This is general advice given by our system to help you stay healthy. However, your care team may have specific advice just for you. Please talk to your care team about your preventive care needs.  Nutrition  Eat 5 or more servings of fruits and vegetables each day.  Try wheat bread, brown rice and whole grain pasta (instead of white bread, rice, and pasta).  Get enough calcium and vitamin D. Check the label on foods and aim for 100% of the RDA (recommended daily allowance).  Lifestyle  Exercise at least 150 minutes each week  (30 minutes a day, 5 days a week).  Do muscle strengthening activities 2 days a week. These help control your weight and prevent disease.  No smoking.  Wear sunscreen to prevent skin cancer.  Have a dental exam and cleaning every 6 months.  Yearly exams  See your health care team every year to talk about:  Any changes in your health.  Any medicines your care team has prescribed.  Preventive care, family planning, and ways to prevent chronic diseases.  Shots (vaccines)   HPV shots (up to age 26), if you've never had them before.  Hepatitis B shots (up to age 59), if you've never had them before.  COVID-19 shot: Get this shot when it's due.  Flu shot: Get a flu shot every year.  Tetanus shot: Get a tetanus shot every 10  years.  Pneumococcal, hepatitis A, and RSV shots: Ask your care team if you need these based on your risk.  Shingles shot (for age 50 and up)  General health tests  Diabetes screening:  Starting at age 35, Get screened for diabetes at least every 3 years.  If you are younger than age 35, ask your care team if you should be screened for diabetes.  Cholesterol test: At age 39, start having a cholesterol test every 5 years, or more often if advised.  Bone density scan (DEXA): At age 50, ask your care team if you should have this scan for osteoporosis (brittle bones).  Hepatitis C: Get tested at least once in your life.  STIs (sexually transmitted infections)  Before age 24: Ask your care team if you should be screened for STIs.  After age 24: Get screened for STIs if you're at risk. You are at risk for STIs (including HIV) if:  You are sexually active with more than one person.  You don't use condoms every time.  You or a partner was diagnosed with a sexually transmitted infection.  If you are at risk for HIV, ask about PrEP medicine to prevent HIV.  Get tested for HIV at least once in your life, whether you are at risk for HIV or not.  Cancer screening tests  Cervical cancer screening: If you have a cervix, begin getting regular cervical cancer screening tests starting at age 21.  Breast cancer scan (mammogram): If you've ever had breasts, begin having regular mammograms starting at age 40. This is a scan to check for breast cancer.  Colon cancer screening: It is important to start screening for colon cancer at age 45.  Have a colonoscopy test every 10 years (or more often if you're at risk) Or, ask your provider about stool tests like a FIT test every year or Cologuard test every 3 years.  To learn more about your testing options, visit:   .  For help making a decision, visit:   https://bit.ly/pb04812.  Prostate cancer screening test: If you have a prostate, ask your care team if a prostate cancer screening test  (PSA) at age 55 is right for you.  Lung cancer screening: If you are a current or former smoker ages 50 to 80, ask your care team if ongoing lung cancer screenings are right for you.  For informational purposes only. Not to replace the advice of your health care provider. Copyright   2023 [employer]. All rights reserved. Clinically reviewed by the [facility] Transitions Program. turntable.fm [id number] - REV 01/24.

## 2024-12-21 ENCOUNTER — HEALTH MAINTENANCE LETTER (OUTPATIENT)
Age: 59
End: 2024-12-21

## 2024-12-23 ENCOUNTER — PATIENT OUTREACH (OUTPATIENT)
Dept: ONCOLOGY | Facility: CLINIC | Age: 59
End: 2024-12-23
Payer: COMMERCIAL

## 2024-12-23 DIAGNOSIS — D72.819 LEUKOPENIA, UNSPECIFIED TYPE: Primary | ICD-10-CM

## 2024-12-23 NOTE — PROGRESS NOTES
New Patient Oncology Nurse Navigator Note     Referral Received: 12/23/24      Referring provider:     Juna Allen DO     Referring Clinic/Organization: [facility]     Referred to: Benign Hematology    Requested provider (if applicable): First available - did not specify      Evaluation for :   Diagnosis:  D72.819 (ICD-10-CM) - Leukopenia, unspecified type     Clinical History (per Nurse review of records provided):      11/20 Alejandro OV:   Anaplasmosis  Treated with doxycycline. Doing well. Needs recheck of labs.   Leukopenia, unspecified type  Elevated liver enzymes  -- check CMP, recheck CBC in 1 month as still slightly low platelets and WBC but improving from prior.   - CBC with platelets     Latest Reference Range & Units 10/24/24 14:45 11/19/24 11:33 12/20/24 08:45   WBC 4.0 - 11.0 10e3/uL 1.4 (L) 3.7 (L) 3.7 (L)   Hemoglobin 13.3 - 17.7 g/dL 14.4 13.6 13.0 (L)   Hematocrit 40.0 - 53.0 % 42.6 42.2 40.2   Platelet Count 150 - 450 10e3/uL 75 (L) 142 (L) 153   RBC Count 4.40 - 5.90 10e6/uL 4.58 4.45 4.30 (L)   MCV 78 - 100 fL 93 95 94   MCH 26.5 - 33.0 pg 31.4 30.6 30.2   MCHC 31.5 - 36.5 g/dL 33.8 32.2 32.3   RDW 10.0 - 15.0 % 12.6 13.5 13.1   % Neutrophils % 60     % Lymphocytes % 25     % Monocytes % 14     % Eosinophils % 0     % Basophils % 1     Absolute Basophils 0.0 - 0.2 10e3/uL 0.0     Absolute Eosinophils 0.0 - 0.7 10e3/uL 0.0     Absolute Immature Granulocytes <=0.4 10e3/uL 0.0     Absolute Lymphocytes 0.8 - 5.3 10e3/uL 0.3 (L)     Absolute Monocytes 0.0 - 1.3 10e3/uL 0.2     % Immature Granulocytes % 1     Absolute Neutrophils 1.6 - 8.3 10e3/uL 0.8 (L)     (L): Data is abnormally low    Records Location: [facility]     Additional testing needed prior to consult:     ?    Referral updates and Plan:     12/23/2024 12:52 PM -  Referral received and reviewed. Sent to NPS to schedule next available.     ALY GallegosN, RN  Hematology/Oncology Nurse Navigator  [facility]  [facility]  [phone] / [phone]

## 2025-01-31 ENCOUNTER — APPOINTMENT (OUTPATIENT)
Dept: CT IMAGING | Facility: CLINIC | Age: 60
End: 2025-01-31
Attending: EMERGENCY MEDICINE
Payer: COMMERCIAL

## 2025-01-31 ENCOUNTER — HOSPITAL ENCOUNTER (EMERGENCY)
Facility: CLINIC | Age: 60
Discharge: HOME | End: 2025-01-31
Attending: EMERGENCY MEDICINE | Admitting: EMERGENCY MEDICINE
Payer: COMMERCIAL

## 2025-01-31 VITALS
TEMPERATURE: 99.3 F | WEIGHT: 188 LBS | RESPIRATION RATE: 18 BRPM | OXYGEN SATURATION: 97 % | SYSTOLIC BLOOD PRESSURE: 133 MMHG | HEART RATE: 80 BPM | DIASTOLIC BLOOD PRESSURE: 59 MMHG | BODY MASS INDEX: 26.32 KG/M2 | HEIGHT: 71 IN

## 2025-01-31 DIAGNOSIS — N40.0 ENLARGED PROSTATE: ICD-10-CM

## 2025-01-31 DIAGNOSIS — R59.0 MESENTERIC LYMPHADENOPATHY: ICD-10-CM

## 2025-01-31 DIAGNOSIS — R11.2 NAUSEA AND VOMITING, UNSPECIFIED VOMITING TYPE: ICD-10-CM

## 2025-01-31 DIAGNOSIS — R91.8 PULMONARY NODULES: ICD-10-CM

## 2025-01-31 LAB
ALBUMIN SERPL BCG-MCNC: 3.7 G/DL (ref 3.5–5.2)
ALBUMIN UR-MCNC: 10 MG/DL
ALP SERPL-CCNC: 82 U/L (ref 40–150)
ALT SERPL W P-5'-P-CCNC: 38 U/L (ref 0–70)
ANION GAP SERPL CALCULATED.3IONS-SCNC: 9 MMOL/L (ref 7–15)
APPEARANCE UR: CLEAR
AST SERPL W P-5'-P-CCNC: 39 U/L (ref 0–45)
B-OH-BUTYR SERPL-SCNC: 0.35 MMOL/L
BASE EXCESS BLDV CALC-SCNC: 2.3 MMOL/L (ref -3–3)
BASOPHILS # BLD AUTO: 0 10E3/UL (ref 0–0.2)
BASOPHILS NFR BLD AUTO: 0 %
BILIRUB SERPL-MCNC: 0.7 MG/DL
BILIRUB UR QL STRIP: NEGATIVE
BUN SERPL-MCNC: 27.3 MG/DL (ref 8–23)
CALCIUM SERPL-MCNC: 8.5 MG/DL (ref 8.8–10.4)
CHLORIDE SERPL-SCNC: 105 MMOL/L (ref 98–107)
COLOR UR AUTO: YELLOW
CREAT SERPL-MCNC: 0.84 MG/DL (ref 0.67–1.17)
EGFRCR SERPLBLD CKD-EPI 2021: >90 ML/MIN/1.73M2
EOSINOPHIL # BLD AUTO: 0 10E3/UL (ref 0–0.7)
EOSINOPHIL NFR BLD AUTO: 0 %
ERYTHROCYTE [DISTWIDTH] IN BLOOD BY AUTOMATED COUNT: 13.6 % (ref 10–15)
FLUAV RNA SPEC QL NAA+PROBE: NEGATIVE
FLUBV RNA RESP QL NAA+PROBE: NEGATIVE
GLUCOSE BLDC GLUCOMTR-MCNC: 166 MG/DL (ref 70–99)
GLUCOSE SERPL-MCNC: 176 MG/DL (ref 70–99)
GLUCOSE UR STRIP-MCNC: 30 MG/DL
HCO3 BLDV-SCNC: 27 MMOL/L (ref 21–28)
HCO3 SERPL-SCNC: 26 MMOL/L (ref 22–29)
HCT VFR BLD AUTO: 38.5 % (ref 40–53)
HGB BLD-MCNC: 12.3 G/DL (ref 13.3–17.7)
HGB UR QL STRIP: NEGATIVE
IMM GRANULOCYTES # BLD: 0 10E3/UL
IMM GRANULOCYTES NFR BLD: 0 %
KETONES UR STRIP-MCNC: 20 MG/DL
LEUKOCYTE ESTERASE UR QL STRIP: NEGATIVE
LIPASE SERPL-CCNC: 15 U/L (ref 13–60)
LYMPHOCYTES # BLD AUTO: 0.2 10E3/UL (ref 0.8–5.3)
LYMPHOCYTES NFR BLD AUTO: 4 %
MAGNESIUM SERPL-MCNC: 1.7 MG/DL (ref 1.7–2.3)
MCH RBC QN AUTO: 30.3 PG (ref 26.5–33)
MCHC RBC AUTO-ENTMCNC: 31.9 G/DL (ref 31.5–36.5)
MCV RBC AUTO: 95 FL (ref 78–100)
MONOCYTES # BLD AUTO: 0.4 10E3/UL (ref 0–1.3)
MONOCYTES NFR BLD AUTO: 7 %
MUCOUS THREADS #/AREA URNS LPF: PRESENT /LPF
NEUTROPHILS # BLD AUTO: 4.9 10E3/UL (ref 1.6–8.3)
NEUTROPHILS NFR BLD AUTO: 88 %
NITRATE UR QL: NEGATIVE
NRBC # BLD AUTO: 0 10E3/UL
NRBC BLD AUTO-RTO: 0 /100
O2/TOTAL GAS SETTING VFR VENT: 0 %
OXYHGB MFR BLDV: 68 % (ref 70–75)
PCO2 BLDV: 42 MM HG (ref 40–50)
PH BLDV: 7.42 [PH] (ref 7.32–7.43)
PH UR STRIP: 5.5 [PH] (ref 5–7)
PLATELET # BLD AUTO: 126 10E3/UL (ref 150–450)
PO2 BLDV: 36 MM HG (ref 25–47)
POTASSIUM SERPL-SCNC: 4.1 MMOL/L (ref 3.4–5.3)
PROT SERPL-MCNC: 6.2 G/DL (ref 6.4–8.3)
RBC # BLD AUTO: 4.06 10E6/UL (ref 4.4–5.9)
RBC URINE: 1 /HPF
RSV RNA SPEC NAA+PROBE: NEGATIVE
SAO2 % BLDV: 68.9 % (ref 70–75)
SARS-COV-2 RNA RESP QL NAA+PROBE: NEGATIVE
SODIUM SERPL-SCNC: 140 MMOL/L (ref 135–145)
SP GR UR STRIP: 1.03 (ref 1–1.03)
UROBILINOGEN UR STRIP-MCNC: NORMAL MG/DL
WBC # BLD AUTO: 5.6 10E3/UL (ref 4–11)
WBC URINE: 1 /HPF

## 2025-01-31 PROCEDURE — 93010 ELECTROCARDIOGRAM REPORT: CPT | Performed by: EMERGENCY MEDICINE

## 2025-01-31 PROCEDURE — 83690 ASSAY OF LIPASE: CPT | Performed by: EMERGENCY MEDICINE

## 2025-01-31 PROCEDURE — 82962 GLUCOSE BLOOD TEST: CPT

## 2025-01-31 PROCEDURE — 85004 AUTOMATED DIFF WBC COUNT: CPT | Performed by: EMERGENCY MEDICINE

## 2025-01-31 PROCEDURE — 250N000011 HC RX IP 250 OP 636: Performed by: EMERGENCY MEDICINE

## 2025-01-31 PROCEDURE — 83735 ASSAY OF MAGNESIUM: CPT | Performed by: EMERGENCY MEDICINE

## 2025-01-31 PROCEDURE — 81001 URINALYSIS AUTO W/SCOPE: CPT | Performed by: EMERGENCY MEDICINE

## 2025-01-31 PROCEDURE — 99285 EMERGENCY DEPT VISIT HI MDM: CPT | Mod: 25 | Performed by: EMERGENCY MEDICINE

## 2025-01-31 PROCEDURE — 96360 HYDRATION IV INFUSION INIT: CPT | Mod: 59 | Performed by: EMERGENCY MEDICINE

## 2025-01-31 PROCEDURE — 82010 KETONE BODYS QUAN: CPT | Performed by: EMERGENCY MEDICINE

## 2025-01-31 PROCEDURE — 93005 ELECTROCARDIOGRAM TRACING: CPT | Performed by: EMERGENCY MEDICINE

## 2025-01-31 PROCEDURE — 74177 CT ABD & PELVIS W/CONTRAST: CPT

## 2025-01-31 PROCEDURE — 36415 COLL VENOUS BLD VENIPUNCTURE: CPT | Performed by: EMERGENCY MEDICINE

## 2025-01-31 PROCEDURE — 80053 COMPREHEN METABOLIC PANEL: CPT | Performed by: EMERGENCY MEDICINE

## 2025-01-31 PROCEDURE — 258N000003 HC RX IP 258 OP 636: Performed by: EMERGENCY MEDICINE

## 2025-01-31 PROCEDURE — 250N000009 HC RX 250: Performed by: EMERGENCY MEDICINE

## 2025-01-31 PROCEDURE — 99284 EMERGENCY DEPT VISIT MOD MDM: CPT | Performed by: EMERGENCY MEDICINE

## 2025-01-31 PROCEDURE — 96361 HYDRATE IV INFUSION ADD-ON: CPT | Performed by: EMERGENCY MEDICINE

## 2025-01-31 PROCEDURE — 82805 BLOOD GASES W/O2 SATURATION: CPT | Performed by: EMERGENCY MEDICINE

## 2025-01-31 PROCEDURE — 87637 SARSCOV2&INF A&B&RSV AMP PRB: CPT | Performed by: EMERGENCY MEDICINE

## 2025-01-31 RX ORDER — IOPAMIDOL 755 MG/ML
92 INJECTION, SOLUTION INTRAVASCULAR ONCE
Status: COMPLETED | OUTPATIENT
Start: 2025-01-31 | End: 2025-01-31

## 2025-01-31 RX ADMIN — SODIUM CHLORIDE 1000 ML: 9 INJECTION, SOLUTION INTRAVENOUS at 08:37

## 2025-01-31 RX ADMIN — SODIUM CHLORIDE 63 ML: 9 INJECTION, SOLUTION INTRAVENOUS at 12:24

## 2025-01-31 RX ADMIN — IOPAMIDOL 92 ML: 755 INJECTION, SOLUTION INTRAVENOUS at 12:24

## 2025-01-31 ASSESSMENT — COLUMBIA-SUICIDE SEVERITY RATING SCALE - C-SSRS
1. IN THE PAST MONTH, HAVE YOU WISHED YOU WERE DEAD OR WISHED YOU COULD GO TO SLEEP AND NOT WAKE UP?: NO
2. HAVE YOU ACTUALLY HAD ANY THOUGHTS OF KILLING YOURSELF IN THE PAST MONTH?: NO
6. HAVE YOU EVER DONE ANYTHING, STARTED TO DO ANYTHING, OR PREPARED TO DO ANYTHING TO END YOUR LIFE?: NO

## 2025-01-31 ASSESSMENT — ACTIVITIES OF DAILY LIVING (ADL)
ADLS_ACUITY_SCORE: 41

## 2025-01-31 NOTE — ED PROVIDER NOTES
[facility]  Emergency Department Visit Note    PATIENT:  Rangel Singh     59 year old     male      [id number]    Chief complaint:  Chief Complaint   Patient presents with    Nausea & Vomiting    Hypoglycemia        History of present illness:  Patient is a 59 year old male with a medical history significant for type 1 diabetes, self inject insulin, does not have an insulin pump, history of hyperlipidemia, left-ventricular hypertrophy, lower back pain, history of service in the [employer] with exposure to radiation presenting for evaluation of weakness.  Patient reports that yesterday he was feeling totally normal.  Did his normal physical exercise which included a 2 mile walk as well as a 3 mile run.  He sat down and ate dinner last night however immediately following this he felt very ill.  He vomited profusely.  He also had low blood sugar all night.  Blood sugar was down around 50.  This was seen on his continuous glucometer.  This morning when he woke up his blood sugar was again low despite consuming sugar.  He gave himself 5 units of insulin this morning and had a glass of orange juice as well as some toast.  Still feeling very unwell and is concerned because he typically does not develop low blood sugars unless he is sick.  Of note patiently was recently diagnosed with anaplasmosis a couple of months ago.  Finished a course of doxycycline however had persistent lab abnormalities which was concerning to his provider so they referred him to hematology for follow-up.  This appointment is scheduled in the future.  At this point he denies any specific symptoms except for a little bit of abdominal discomfort.  He does not feel short of breath or like he has been peeing in excess.  He is not especially thirsty.  No dizziness or fainting episodes.  No recent cough, chest congestion, runny nose, fevers, chills, diarrhea.  At this point he denies any chest pain or shortness of breath but continues  "to feel \"off \".    Review of Systems:  As in HPI above    BP (!) 143/63   Pulse 80   Temp 99.3  F (37.4  C) (Oral)   Resp 18   Ht 1.803 m (5' 11\")   Wt 85.3 kg (188 lb)   SpO2 94%   BMI 26.22 kg/m      Physical Exam  Constitutional: laying in hospital bed, alert, oriented, in no apparent distress, conversant, and answering questions appropriately  HEENT: normocephalic, atraumatic, pupils 3mm, equal, round, and reactive to light, sclerae anicteric, extraocular motions intact, and moist mucous membranes  Neck: able to fully range and no midline tenderness  Cardiovascular: regular rate and rhythm  Pulmonary: breathing comfortably on room air and lungs clear to auscultation bilaterally  Abdominal: Soft, mild tenderness palpation especially in the epigastric region, no suprapubic tenderness to palpation, no guarding  Genitourinary: deferred  Extremities/MSK: no peripheral edema, no cyanosis , and no calf tenderness to palpation  Skin: warm, dry and non-diaphoretic  Neurologic: moves all four extremities spontaneously and GCS 15  Psychiatric: calm, appropriate      MDM:  Patient is a 59 year old male with above history presenting for evaluation of lower blood sugars..    Vitals reassuring and within normal limits.  Afebrile. Exam reveals abdominal discomfort, vitally stable, nontoxic-appearing in no respiratory distress.    At this point my differential diagnosis includes but is not limited to DKA, dehydration, electrolyte abnormality, viral infection, anemia, malignancy, insulin overdose, labile diabetes, HHS.    Considering patient's history of lab abnormalities, discomfort.  I am most concerned about a diabetes complication.  Plan for basic labs, urine, beta hydroxybutyrate, EKG, VBG.  Patient is in agreement with this plan.    ECG:  - Ventricular rate 79 bpm, regular  - FL, QRS, QT intervals notable for wide QRS  - Axis left  - no ST segment or T wave changes concerning for acute ischemia  - Comparison to prior " ECGs: no changes   - My independent interpretation: negative for Sgarbossa criteria, overall reassuring in this context      Remainder of ED course below.    ED COURSE:  ED Course as of 01/31/25 1508   Fri Jan 31, 2025   1307 Viral swab negative, beta hydroxybutyrate is notable for 0.35 however low concern for DKA with this.  He has a normal anion gap.  Normal bicarb.  BUN is 27.3 however this is inconclusive.  pH is normal at 7.42.  Labs independently reviewed by me.   1308 No findings here that have me concerned about a specific pathology.  However CBC is concerning because the patient has a low hemoglobin, low hematocrit, low platelets and a decreased absolute lymphocyte count.  This is not clearly pancytopenia however it does not appear normal.  However patient has known blood count abnormalities.  Already has follow-up scheduled with hematology.   1309 Plan for CT abdomen to evaluate for possible abdominal cause of his symptoms.  Pending the results of this, likely will discharge patient.  He is tolerating fluids and has not had any low blood sugars at this point.  It is possible that his transient vomiting last night is to blame for his symptoms.  Likely discharge pending the results of the CT.  Patient is in agreement with this plan.  No additional vomiting while here   1507 CT Abdomen Pelvis w Contrast  IMPRESSION:   1.  Prominent to mildly enlarged central mesenteric lymph nodes of uncertain etiology. No inflammatory change or mass through the abdomen and pelvis. Follow-up CT would be recommended in 3-6 months as a lymphoproliferative disorder cannot be excluded.  2.  Multiple small nodules in both lung bases measuring up to 5 mm in size. Follow-up may be recommended as outlined below. If the patient has a history of smoking, malignancy, or other risk factors, follow-up CT would be recommended in one year.  3.  Enlarged prostate indents the underside of the bladder.     1507 Male with history of enlarged  prostate and pulmonary nodules.  These have been there before.  However the mildly enlarged central mesenteric lymph nodes are new.  This is either related to a gastroenteritis or some sort of other ongoing process.  She is able to keep food down at this time.  As we discussed his workup is largely reassuring against emergent cause of his symptoms at this time so recommend he follow-up with hematologist as scheduled.  Return precautions discussed.  Recommended he continue encouraging oral hydration and closely monitor symptoms and follow-up with hematology as scheduled.   4394 Follow-up with primary care doctor about pulmonary nodules and mesenteric lymphadenopathy.       Encounter Diagnoses:  Final diagnoses:   Nausea and vomiting, unspecified vomiting type   Pulmonary nodules   Enlarged prostate   Mesenteric lymphadenopathy       Final disposition: discharge    Kassy Mccann DO   Physician  [facility]       Kassy Mccann DO  01/31/25 0147

## 2025-01-31 NOTE — DISCHARGE INSTRUCTIONS
You were seen in the emergency department today after you had generalized malaise as well as abdominal pain and vomiting.  As we discussed I am not sure what is causing your symptoms.  It is likely that you have some sort of virus that is contributing to your symptoms which caused your low blood sugar.  However you do have multiple incidental findings today.  If I put all of these pieces together and still not exactly sure what is causing your symptoms.  I recommend that you follow-up with your oncologist as scheduled later this month.  I also recommend that you continue using your insulin and following your blood sugars closely.  Come back if your blood sugar gets low again.    Follow-up with hematology in regards to the pulmonary nodules, lymphadenopathy in your stomach, abnormal blood counts.  As we discussed I think all of these things together are mildly concerning however it is possible that all of this together is not related and you just have some sort of virus and also some sort of ongoing blood abnormality swelling or anaplasmosis.  As always please come back with changing or worsening symptoms that you find concerning.  We be happy to see you here again in the [place] ER if your symptoms persist.  Work to stay hydrated and follow-up with your primary care provider if you need reassessment sooner.

## 2025-01-31 NOTE — ED TRIAGE NOTES
Pt here with nausea and vomiting, bodyaches, blood sugar was 50 last night, he drank orange juice, vomited, then it came up. Hx of DM1.     Triage Assessment (Adult)       Row Name 01/31/25 0812          Triage Assessment    Airway WDL WDL        Respiratory WDL    Respiratory WDL WDL        Cardiac WDL    Cardiac WDL WDL        Peripheral/Neurovascular WDL    Peripheral Neurovascular WDL WDL        Cognitive/Neuro/Behavioral WDL    Cognitive/Neuro/Behavioral WDL WDL

## 2025-02-02 LAB
ATRIAL RATE - MUSE: 79 BPM
DIASTOLIC BLOOD PRESSURE - MUSE: NORMAL MMHG
INTERPRETATION ECG - MUSE: NORMAL
P AXIS - MUSE: 52 DEGREES
PR INTERVAL - MUSE: 192 MS
QRS DURATION - MUSE: 134 MS
QT - MUSE: 428 MS
QTC - MUSE: 490 MS
R AXIS - MUSE: -41 DEGREES
SYSTOLIC BLOOD PRESSURE - MUSE: NORMAL MMHG
T AXIS - MUSE: 94 DEGREES
VENTRICULAR RATE- MUSE: 79 BPM

## 2025-02-03 ENCOUNTER — MYC MEDICAL ADVICE (OUTPATIENT)
Dept: FAMILY MEDICINE | Facility: CLINIC | Age: 60
End: 2025-02-03
Payer: COMMERCIAL

## 2025-02-05 ENCOUNTER — OFFICE VISIT (OUTPATIENT)
Dept: FAMILY MEDICINE | Facility: CLINIC | Age: 60
End: 2025-02-05
Payer: COMMERCIAL

## 2025-02-05 VITALS
HEIGHT: 71 IN | RESPIRATION RATE: 18 BRPM | SYSTOLIC BLOOD PRESSURE: 123 MMHG | BODY MASS INDEX: 26.46 KG/M2 | DIASTOLIC BLOOD PRESSURE: 67 MMHG | HEART RATE: 62 BPM | OXYGEN SATURATION: 95 % | TEMPERATURE: 97.6 F | WEIGHT: 189 LBS

## 2025-02-05 DIAGNOSIS — E10.649 TYPE 1 DIABETES MELLITUS WITH HYPOGLYCEMIA AND WITHOUT COMA (H): ICD-10-CM

## 2025-02-05 DIAGNOSIS — D64.9 ANEMIA, UNSPECIFIED TYPE: ICD-10-CM

## 2025-02-05 DIAGNOSIS — D69.6 THROMBOCYTOPENIA: ICD-10-CM

## 2025-02-05 DIAGNOSIS — R11.2 NAUSEA AND VOMITING, UNSPECIFIED VOMITING TYPE: Primary | ICD-10-CM

## 2025-02-05 DIAGNOSIS — R59.9 LYMPH NODE ENLARGEMENT: ICD-10-CM

## 2025-02-05 LAB
BASOPHILS # BLD AUTO: 0 10E3/UL (ref 0–0.2)
BASOPHILS NFR BLD AUTO: 1 %
EOSINOPHIL # BLD AUTO: 0.1 10E3/UL (ref 0–0.7)
EOSINOPHIL NFR BLD AUTO: 2 %
ERYTHROCYTE [DISTWIDTH] IN BLOOD BY AUTOMATED COUNT: 12.7 % (ref 10–15)
EST. AVERAGE GLUCOSE BLD GHB EST-MCNC: 131 MG/DL
FERRITIN SERPL-MCNC: 88 NG/ML (ref 31–409)
HBA1C MFR BLD: 6.2 % (ref 0–5.6)
HCT VFR BLD AUTO: 41 % (ref 40–53)
HGB BLD-MCNC: 13.5 G/DL (ref 13.3–17.7)
IMM GRANULOCYTES # BLD: 0 10E3/UL
IMM GRANULOCYTES NFR BLD: 0 %
IRON BINDING CAPACITY (ROCHE): 225 UG/DL (ref 240–430)
IRON SATN MFR SERPL: 31 % (ref 15–46)
IRON SERPL-MCNC: 69 UG/DL (ref 61–157)
LYMPHOCYTES # BLD AUTO: 1 10E3/UL (ref 0.8–5.3)
LYMPHOCYTES NFR BLD AUTO: 25 %
MCH RBC QN AUTO: 30.1 PG (ref 26.5–33)
MCHC RBC AUTO-ENTMCNC: 32.9 G/DL (ref 31.5–36.5)
MCV RBC AUTO: 91 FL (ref 78–100)
MONOCYTES # BLD AUTO: 0.5 10E3/UL (ref 0–1.3)
MONOCYTES NFR BLD AUTO: 11 %
NEUTROPHILS # BLD AUTO: 2.4 10E3/UL (ref 1.6–8.3)
NEUTROPHILS NFR BLD AUTO: 61 %
PLATELET # BLD AUTO: 172 10E3/UL (ref 150–450)
RBC # BLD AUTO: 4.49 10E6/UL (ref 4.4–5.9)
RETICS # AUTO: 0.02 10E6/UL (ref 0.03–0.1)
RETICS/RBC NFR AUTO: 0.5 % (ref 0.5–2)
WBC # BLD AUTO: 4 10E3/UL (ref 4–11)

## 2025-02-05 PROCEDURE — 85025 COMPLETE CBC W/AUTO DIFF WBC: CPT | Performed by: FAMILY MEDICINE

## 2025-02-05 PROCEDURE — 36415 COLL VENOUS BLD VENIPUNCTURE: CPT | Performed by: FAMILY MEDICINE

## 2025-02-05 PROCEDURE — 82728 ASSAY OF FERRITIN: CPT | Performed by: FAMILY MEDICINE

## 2025-02-05 PROCEDURE — 99214 OFFICE O/P EST MOD 30 MIN: CPT | Performed by: FAMILY MEDICINE

## 2025-02-05 PROCEDURE — 85045 AUTOMATED RETICULOCYTE COUNT: CPT | Performed by: FAMILY MEDICINE

## 2025-02-05 PROCEDURE — 82607 VITAMIN B-12: CPT | Performed by: FAMILY MEDICINE

## 2025-02-05 PROCEDURE — 83036 HEMOGLOBIN GLYCOSYLATED A1C: CPT | Performed by: FAMILY MEDICINE

## 2025-02-05 PROCEDURE — 83540 ASSAY OF IRON: CPT | Performed by: FAMILY MEDICINE

## 2025-02-05 PROCEDURE — 83550 IRON BINDING TEST: CPT | Performed by: FAMILY MEDICINE

## 2025-02-05 ASSESSMENT — PAIN SCALES - GENERAL: PAINLEVEL_OUTOF10: NO PAIN (0)

## 2025-02-05 NOTE — PROGRESS NOTES
Assessment & Plan     Nausea and vomiting, unspecified vomiting type  - resolved. Overall doing well.     Type 1 diabetes mellitus with hypoglycemia and without coma (H)  Follows with Endocrinology. Check A1c today.   - Hemoglobin A1c    Lymph node enlargement  Noted on most recent CT. Enlarged central mesenteric lymph nodes. Plan for repeat CT in 3 months.   - CT Abdomen Pelvis w Contrast    Anemia, unspecified type  Check labs as below. Follow up with Hematology as scheduled.   - CBC with platelets and differential  - Ferritin  - Iron and iron binding capacity  - Vitamin B12  - Lab Blood Morphology Pathologist Review    Thrombocytopenia  Follow up with Hematology for further evaluation and cares.       The risks, benefits and treatment options of prescribed medications or other treatments have been discussed with the patient. The patient verbalized their understanding and should call or follow up if no improvement or if they develop further problems.    30 minutes spent on the date of the encounter doing chart review, history and exam, documentation and further activities as noted above        MED REC REQUIRED  Post Medication Reconciliation Status: discharge medications reconciled, continue medications without change        Subjective   Ray is a 59 year old, presenting for the following health issues:  Hospital F/U (1/31/25 )        2/5/2025     1:13 PM   Additional Questions   Roomed by Patience Nunez   Accompanied by wife         2/5/2025     1:13 PM   Patient Reported Additional Medications   Patient reports taking the following new medications none     HPI     ED/UC Followup:    Facility:  [facility]   Date of visit: 1/31/25  Reason for visit: Nausea and vomiting, hypoglycemia  Current Status: Patient states no nausea and vomiting, says he is starting to feel better but he isn't all way there.       CT abdomen pelvis 1/31/2025  IMPRESSION:   1.  Prominent to mildly enlarged central mesenteric  "lymph nodes of uncertain etiology. No inflammatory change or mass through the abdomen and pelvis. Follow-up CT would be recommended in 3-6 months as a lymphoproliferative disorder cannot be excluded.  2.  Multiple small nodules in both lung bases measuring up to 5 mm in size. Follow-up may be recommended as outlined below. If the patient has a history of smoking, malignancy, or other risk factors, follow-up CT would be recommended in one year.  3.  Enlarged prostate indents the underside of the bladder.      Overall seems to be improving.  No nausea or vomiting.   No abdominal pain.   No blood loss.      in [employer] for 6 years. ([id number])  Would test radiation levels.         Review of Systems  Constitutional, HEENT, cardiovascular, pulmonary, gi and gu systems are negative, except as otherwise noted.      Objective    /67 (BP Location: Right arm, Patient Position: Sitting, Cuff Size: Adult Regular)   Pulse 62   Temp 97.6  F (36.4  C) (Tympanic)   Resp 18   Ht 1.81 m (5' 11.26\")   Wt 85.7 kg (189 lb)   SpO2 95%   BMI 26.17 kg/m    Body mass index is 26.17 kg/m .  Physical Exam   General: alert, cooperative, no acute distress   CV: RRR, no murmur  Resp: non-labored breathing, clear to auscultation, no wheezing or rales   Abdomen: Soft, non-tender, no guarding.   Extremities: No peripheral edema, calves non-tender.       Signed Electronically by: Juan Allen,     "

## 2025-02-06 LAB — VIT B12 SERPL-MCNC: 341 PG/ML (ref 232–1245)

## 2025-02-19 ENCOUNTER — ONCOLOGY VISIT (OUTPATIENT)
Dept: ONCOLOGY | Facility: CLINIC | Age: 60
End: 2025-02-19
Attending: FAMILY MEDICINE
Payer: COMMERCIAL

## 2025-02-19 VITALS
WEIGHT: 192 LBS | DIASTOLIC BLOOD PRESSURE: 59 MMHG | OXYGEN SATURATION: 95 % | HEART RATE: 66 BPM | TEMPERATURE: 97.6 F | SYSTOLIC BLOOD PRESSURE: 131 MMHG | RESPIRATION RATE: 12 BRPM | HEIGHT: 71 IN | BODY MASS INDEX: 26.88 KG/M2

## 2025-02-19 DIAGNOSIS — D69.6 THROMBOCYTOPENIA: ICD-10-CM

## 2025-02-19 DIAGNOSIS — R91.8 PULMONARY NODULES: ICD-10-CM

## 2025-02-19 DIAGNOSIS — D72.819 LEUKOPENIA, UNSPECIFIED TYPE: ICD-10-CM

## 2025-02-19 DIAGNOSIS — I44.7 LEFT BUNDLE-BRANCH BLOCK: ICD-10-CM

## 2025-02-19 DIAGNOSIS — R59.9 LYMPH NODE ENLARGEMENT: Primary | ICD-10-CM

## 2025-02-19 DIAGNOSIS — E10.3293 TYPE 1 DIABETES MELLITUS WITH MILD NONPROLIFERATIVE RETINOPATHY OF BOTH EYES WITHOUT MACULAR EDEMA (H): Chronic | ICD-10-CM

## 2025-02-19 PROCEDURE — 99213 OFFICE O/P EST LOW 20 MIN: CPT | Performed by: INTERNAL MEDICINE

## 2025-02-19 PROCEDURE — 99205 OFFICE O/P NEW HI 60 MIN: CPT | Performed by: INTERNAL MEDICINE

## 2025-02-19 ASSESSMENT — PAIN SCALES - GENERAL: PAINLEVEL_OUTOF10: NO PAIN (0)

## 2025-02-19 NOTE — PROGRESS NOTES
"Oncology Rooming Note    February 19, 2025 3:04 PM   Rangel Singh is a 59 year old male who presents for:    Chief Complaint   Patient presents with    Hematology     Leukopenia, unspecified type - New consult     Initial Vitals: /59 (BP Location: Right arm, Patient Position: Sitting, Cuff Size: Adult Regular)   Pulse 66   Temp 97.6  F (36.4  C) (Tympanic)   Resp 12   Ht 1.797 m (5' 10.75\")   Wt 87.1 kg (192 lb)   SpO2 95%   BMI 26.97 kg/m   Estimated body mass index is 26.97 kg/m  as calculated from the following:    Height as of this encounter: 1.797 m (5' 10.75\").    Weight as of this encounter: 87.1 kg (192 lb). Body surface area is 2.09 meters squared.  No Pain (0) Comment: Data Unavailable   No LMP for male patient.  Allergies reviewed: Yes  Medications reviewed: Yes    Medications: Medication refills not needed today.  Pharmacy name entered into [facility]:    [facility] [address] [place] [address] - [address] [facility] [place], [place] - [address] [place] [address]    Frailty Screening:   Is the patient here for a new oncology consult visit in cancer care? 2. No    PHQ9:  Did this patient require a PHQ9?: No      Clinical concerns:  New consult      Kristina Ambriz CMA            "

## 2025-02-19 NOTE — PROGRESS NOTES
[facility] Hematology and Oncology Consult Note    Patient: Rangel Singh  MRN: [id number]  Date of Service: Feb 19, 2025       Reason for Visit    I was consulted by   Juan Allen DO     For evaluation of leukopenia.      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  CBC on 1/31/2025 showed mild anemia and thrombocytopenia  However blood morphology on 2/5/2025 with a CBC was entirely normal.  Imaging of the abdomen on 1/31/2025 shows some borderline lymphadenopathy.  Patient does note he gets short of breath easily now for the last year or so.  He was formerly a marathon runner.  EKG shows left bundle branch block.  Advised referral to cardiology as incipient heart failure may explain his dyspnea on exertion.  He also notes queasy stomach after eating.  This may reflect diabetic gastroparesis.  He has a history of pulmonary nodules.  I will add a CT of the chest scheduled CT of the abdomen and pelvis for 5/5/2025.  He should follow-up with his primary care with blood counts every 3 to 4 months to monitor for stability.  Follow-up with hematology as needed      ______________________________________________________________________________    History of Present Illness    Mr. Rangel Singh is a 59 year old 59-year-old man with a history of type 1 diabetes.  Up until 2 years ago he was running marathons.  Then he fairly quickly became short of breath on exertion and now feels out of breath even after walking 1 block.  A previous  "electrocardiogram shows left bundle branch block.      In October 2024 he had anaplasmosis which responded well to doxycycline.  However since then he has had issues with cytopenias.  However he has not had the symptoms and on repeat testing his cytopenias have always resolved.  Recent CT of the abdomen showed possible lymphadenopathy.  A follow-up scan is from May 2025.  He also has a history of pulmonary nodules.    Presently he feels fairly well.  He is maintaining his weight.  He does not have chills, fevers or sweats.  He takes Flomax for prostate symptoms.  He notes that his stomach feels upset after eating.  He is a retired .  There is no family history of blood disorders he does not smoke or use alcohol to excess.    Review of systems.  Pertinent Findings are included in the History of Present Illness    Physical Exam    /59 (BP Location: Right arm, Patient Position: Sitting, Cuff Size: Adult Regular)   Pulse 66   Temp 97.6  F (36.4  C) (Tympanic)   Resp 12   Ht 1.797 m (5' 10.75\")   Wt 87.1 kg (192 lb)   SpO2 95%   BMI 26.97 kg/m       GENERAL APPEARANCE: Healthy appearing man in no apparent distress.  HEAD: Atraumatic; normocephalic; without lesions.  EYES: Conjunctiva, corneas and eyelids normal; pupils equal, round, reactive to light; No Icterus.  MOUTH/OROPHARYNX: Oral mucosa intact  NECK: Supple with no nodes.  LUNGS:  Clear to auscultation and percussion with no extra sounds.  HEART: Regular rhythm and rate; S1 and S2 normal; no murmurs noted.  ABDOMEN: Soft; no masses or tenderness, no hepatosplenomegaly.  NEUROLOGIC: Alert and oriented.  No obvious focal findings.  EXTREMITIES: No cyanosis, or edema.  SKIN: No abnormal bruising or bleeding. No suspicious lesions noted on exposed skin.  PSYCHIATRIC: Mental status normal; no apparent psychiatric issues    Medications:    Current Outpatient Medications   Medication Sig Dispense Refill    blood glucose (NO BRAND SPECIFIED) test " "strip Check BS 4-5x/day.  Dispense 3 month supply. Accu-Check Compact Plus. 510 each 11    Cholecalciferol (VITAMIN D) 50 MCG (2000 UT) CAPS       ezetimibe (ZETIA) 10 MG tablet Take 1 tablet by mouth every morning      insulin glargine (LANTUS VIAL) 100 UNIT/ML vial INJECT 2 TO 5 UNITS SUBCUTANEOUSLY AT BEDTIME 10 mL 11    insulin lispro (HUMALOG) 100 UNIT/ML vial INJECT 2-3 UNITS IN THE MORNING, 2-3 AT LUNCH, AND 2-4 IN THE EVENING AND SLIDING SCALE. ESTIMATED MAXIMUM UNITS DAILY IS 25 UNITS PER DAY 10 mL 11    insulin syringe-needle U-100 (BD VEO INSULIN SYRINGE U/F) 31G X 15/64\" 0.3 ML Use 4 syringes daily or as directed. 120 each 11    insulin syringes, disposable, U-100 0.3 ML MISC 1 Device 4 times daily 100 each 11    rosuvastatin (CRESTOR) 40 MG tablet Take 1 tablet (40 mg) by mouth daily. Profile 90 tablet 3    tamsulosin (FLOMAX) 0.4 MG capsule Take 1 capsule (0.4 mg) by mouth daily. 90 capsule 3    TRUEPLUS LANCETS 33G MISC 1 Device 4 times daily 100 each 11    glucagon (GLUCAGON EMERGENCY) 1 MG kit Inject 1 mg into the muscle once for 1 dose 1 mg 11     Current Facility-Administered Medications   Medication Dose Route Frequency Provider Last Rate Last Admin    bupivacaine 0.5 % -  EPINEPHrine 1:200,000 injection 30 mL  30 mL Intradermal On Call to OR Stephan Fregoso MD        dexamethasone PF (DECADRON) injection 4 mg  4 mg Intramuscular On Call to OR Stephan Fregoso MD               Past History    Past Medical History:   Diagnosis Date    CELLULITIS NOS     left danny-auricular 2004    Facial cellulitis 08/11/2014    Type 1 diabetes mellitus (H)      Past Surgical History:   Procedure Laterality Date    COLONOSCOPY  1/3/2014    Procedure: COMBINED COLONOSCOPY, SINGLE BIOPSY/POLYPECTOMY BY BIOPSY;  Colonoscopy;  Surgeon: Ed Galvez MD;  Location: [facility]    COLONOSCOPY N/A 2/26/2024    Procedure: Colonoscopy;  Surgeon: Jose F Vance MD;  Location: [facility]    DISCECTOMY LUMBAR " POSTERIOR MICROSCOPIC ONE LEVEL Right 2/17/2023    Procedure: Right Lumbar 4-5 Microdiscectomy;  Surgeon: Stephan Fregoso MD;  Location: [facility]    EXAM UNDER ANESTHESIA, MANIPULATE JOINT (LOCATION) Left 3/30/2016    Procedure: EXAM UNDER ANESTHESIA, MANIPULATE JOINT (LOCATION);  Surgeon: Justen Ford MD;  Location: [facility]    SURGICAL HISTORY OF -   08/97    appendectomy ([place], [place])      Allergies   Allergen Reactions    Nkda [No Known Drug Allergy]      Family History   Problem Relation Age of Onset    C.A.D. Father         50s     Hypertension Father     Atrial fibrillation Father     Hypertension Mother     Cerebrovascular Disease Mother     Atrial fibrillation Mother     Cancer Maternal Grandfather         ?throat/lung cancer    Cerebrovascular Disease Maternal Grandmother     Arthritis Sister         RA    C.A.D. Brother     Atrial fibrillation Brother     Circulatory Other         2 cousins on maternal side .w anurysms    Cancer - colorectal No family hx of     Diabetes No family hx of      Social History     Socioeconomic History    Marital status:      Spouse name: None    Number of children: None    Years of education: None    Highest education level: None   Occupational History    Occupation: [employer]     Employer: [employer]   Tobacco Use    Smoking status: Never    Smokeless tobacco: Never   Vaping Use    Vaping status: Never Used   Substance and Sexual Activity    Alcohol use: Yes     Comment: occ. 2-3 times a month    Drug use: No    Sexual activity: Yes     Partners: Female   Other Topics Concern    Parent/sibling w/ CABG, MI or angioplasty before 65F 55M? No     Social Drivers of Health     Financial Resource Strain: Low Risk  (11/15/2024)    Financial Resource Strain     Within the past 12 months, have you or your family members you live with been unable to get utilities (heat, electricity) when it was really needed?: No   Food Insecurity: Low Risk  (11/15/2024)    Food  Insecurity     Within the past 12 months, did you worry that your food would run out before you got money to buy more?: No     Within the past 12 months, did the food you bought just not last and you didn t have money to get more?: No   Transportation Needs: Low Risk  (11/15/2024)    Transportation Needs     Within the past 12 months, has lack of transportation kept you from medical appointments, getting your medicines, non-medical meetings or appointments, work, or from getting things that you need?: No   Physical Activity: Sufficiently Active (11/15/2024)    Exercise Vital Sign     Days of Exercise per Week: 6 days     Minutes of Exercise per Session: 60 min   Stress: No Stress Concern Present (11/15/2024)    [employer] - Occupational Stress Questionnaire     Feeling of Stress : Not at all   Social Connections: Unknown (11/15/2024)    Social Connection and Isolation Panel [NHANES]     Frequency of Social Gatherings with Friends and Family: Patient declined   Interpersonal Safety: Low Risk  (11/20/2024)    Interpersonal Safety     Do you feel physically and emotionally safe where you currently live?: Yes     Within the past 12 months, have you been hit, slapped, kicked or otherwise physically hurt by someone?: No     Within the past 12 months, have you been humiliated or emotionally abused in other ways by your partner or ex-partner?: No   Housing Stability: Low Risk  (11/15/2024)    Housing Stability     Do you have housing? : Yes     Are you worried about losing your housing?: No           Lab Results    Recent Results (from the past 720 hours)   Glucose by meter    Collection Time: 01/31/25  8:02 AM   Result Value Ref Range    GLUCOSE BY METER POCT 166 (H) 70 - 99 mg/dL   Ketone Beta-Hydroxybutyrate Quantitative    Collection Time: 01/31/25  8:37 AM   Result Value Ref Range    Ketone (Beta-Hydroxybutyrate) Quantitative 0.35 (H) <=0.30 mmol/L   Comprehensive metabolic panel     Collection Time: 01/31/25  8:37 AM   Result Value Ref Range    Sodium 140 135 - 145 mmol/L    Potassium 4.1 3.4 - 5.3 mmol/L    Carbon Dioxide (CO2) 26 22 - 29 mmol/L    Anion Gap 9 7 - 15 mmol/L    Urea Nitrogen 27.3 (H) 8.0 - 23.0 mg/dL    Creatinine 0.84 0.67 - 1.17 mg/dL    GFR Estimate >90 >60 mL/min/1.73m2    Calcium 8.5 (L) 8.8 - 10.4 mg/dL    Chloride 105 98 - 107 mmol/L    Glucose 176 (H) 70 - 99 mg/dL    Alkaline Phosphatase 82 40 - 150 U/L    AST 39 0 - 45 U/L    ALT 38 0 - 70 U/L    Protein Total 6.2 (L) 6.4 - 8.3 g/dL    Albumin 3.7 3.5 - 5.2 g/dL    Bilirubin Total 0.7 <=1.2 mg/dL   Blood gas venous    Collection Time: 01/31/25  8:37 AM   Result Value Ref Range    pH Venous 7.42 7.32 - 7.43    pCO2 Venous 42 40 - 50 mm Hg    pO2 Venous 36 25 - 47 mm Hg    Bicarbonate Venous 27 21 - 28 mmol/L    Base Excess/Deficit Venous 2.3 -3.0 - 3.0 mmol/L    FIO2 0     Oxyhemoglobin Venous 68 (L) 70 - 75 %    O2 Sat, Venous 68.9 (L) 70.0 - 75.0 %   Influenza A/B, RSV and SARS-CoV2 PCR (COVID-19) Nasopharyngeal    Collection Time: 01/31/25  8:37 AM    Specimen: Nasopharyngeal; Swab   Result Value Ref Range    Influenza A PCR Negative Negative    Influenza B PCR Negative Negative    RSV PCR Negative Negative    SARS CoV2 PCR Negative Negative   Magnesium    Collection Time: 01/31/25  8:37 AM   Result Value Ref Range    Magnesium 1.7 1.7 - 2.3 mg/dL   Lipase    Collection Time: 01/31/25  8:37 AM   Result Value Ref Range    Lipase 15 13 - 60 U/L   UA with Microscopic reflex to Culture    Collection Time: 01/31/25 10:16 AM    Specimen: Urine, Catheter   Result Value Ref Range    Color Urine Yellow Colorless, Straw, Light Yellow, Yellow    Appearance Urine Clear Clear    Glucose Urine 30 (A) Negative mg/dL    Bilirubin Urine Negative Negative    Ketones Urine 20 (A) Negative mg/dL    Specific Gravity Urine 1.030 1.003 - 1.035    Blood Urine Negative Negative    pH Urine 5.5 5.0 - 7.0    Protein Albumin Urine 10 (A)  Negative mg/dL    Urobilinogen Urine Normal Normal, 2.0 mg/dL    Nitrite Urine Negative Negative    Leukocyte Esterase Urine Negative Negative    Mucus Urine Present (A) None Seen /LPF    RBC Urine 1 <=2 /HPF    WBC Urine 1 <=5 /HPF   CBC with platelets and differential    Collection Time: 01/31/25 10:16 AM   Result Value Ref Range    WBC Count 5.6 4.0 - 11.0 10e3/uL    RBC Count 4.06 (L) 4.40 - 5.90 10e6/uL    Hemoglobin 12.3 (L) 13.3 - 17.7 g/dL    Hematocrit 38.5 (L) 40.0 - 53.0 %    MCV 95 78 - 100 fL    MCH 30.3 26.5 - 33.0 pg    MCHC 31.9 31.5 - 36.5 g/dL    RDW 13.6 10.0 - 15.0 %    Platelet Count 126 (L) 150 - 450 10e3/uL    % Neutrophils 88 %    % Lymphocytes 4 %    % Monocytes 7 %    % Eosinophils 0 %    % Basophils 0 %    % Immature Granulocytes 0 %    NRBCs per 100 WBC 0 <1 /100    Absolute Neutrophils 4.9 1.6 - 8.3 10e3/uL    Absolute Lymphocytes 0.2 (L) 0.8 - 5.3 10e3/uL    Absolute Monocytes 0.4 0.0 - 1.3 10e3/uL    Absolute Eosinophils 0.0 0.0 - 0.7 10e3/uL    Absolute Basophils 0.0 0.0 - 0.2 10e3/uL    Absolute Immature Granulocytes 0.0 <=0.4 10e3/uL    Absolute NRBCs 0.0 10e3/uL   EKG 12 lead    Collection Time: 01/31/25 12:35 PM   Result Value Ref Range    Systolic Blood Pressure  mmHg    Diastolic Blood Pressure  mmHg    Ventricular Rate 79 BPM    Atrial Rate 79 BPM    MI Interval 192 ms    QRS Duration 134 ms     ms    QTc 490 ms    P Axis 52 degrees    R AXIS -41 degrees    T Axis 94 degrees    Interpretation ECG       Sinus rhythm  Left axis deviation  Left bundle branch block  Abnormal ECG  When compared with ECG of 17-Feb-2023 12:13,  T wave amplitude has decreased in Lateral leads  Confirmed by SEE ED PROVIDER NOTE FOR, ECG INTERPRETATION ([id number]),  Heather Eller ([id number]) on 2/2/2025 8:11:00 AM     Ferritin    Collection Time: 02/05/25  2:03 PM   Result Value Ref Range    Ferritin 88 31 - 409 ng/mL   Iron and iron binding capacity    Collection Time: 02/05/25  2:03 PM   Result  Value Ref Range    Iron 69 61 - 157 ug/dL    Iron Binding Capacity 225 (L) 240 - 430 ug/dL    Iron Sat Index 31 15 - 46 %   Vitamin B12    Collection Time: 02/05/25  2:03 PM   Result Value Ref Range    Vitamin B12 341 232 - 1,245 pg/mL   Hemoglobin A1c    Collection Time: 02/05/25  2:03 PM   Result Value Ref Range    Estimated Average Glucose 131 (H) <117 mg/dL    Hemoglobin A1C 6.2 (H) 0.0 - 5.6 %   Bld morphology pathology review    Collection Time: 02/05/25  2:03 PM   Result Value Ref Range    Final Diagnosis       A.  Peripheral blood smear for morphology:  - No diagnostic abnormalities identified, normal peripheral blood smear for age and gender.      Comment       The patient's prior anemia and thrombocytopenia from a CBC of 1/31/2025 has resolved in the present study.      Clinical Information       59-year-old male with a prior history of anemia and thrombocytopenia      Peripheral Smear       PERIPHERAL BLOOD DIFFERENTIAL:    The automated differential associated with the CBC for this case was imported from [employer] and was confirmed accurate with a 200 cell count manual differential review of the smear prepared from a blood sample collected 2/5/2025.    PERIPHERAL BLOOD MORPHOLOGY:    ERYTHROCYTES:  The red cells are normocytic, normochromic and normal in number for the patient's age and gender. No significant anisopoikilocytosis is seen. No features of hemolysis or increased polychromasia are identified.  No parasites are identified.    LEUKOCYTES:  The leukocytes are morphologically normal and normal in number. No immature precursors or evidence of neutrophilic dysplasia is seen. No atypical lymphoid cells are seen. No bacteria,  parasites or parasitic inclusions are seen.           PLATELETS:  The platelets appear normal in number and morphology.      Peripheral Hematologic Data        Latest Reference Range & Units 02/05/25 14:03   WBC 4.0 - 11.0 10e3/uL 4.0   Hemoglobin 13.3 - 17.7 g/dL 13.5   Hematocrit  40.0 - 53.0 % 41.0   Platelet Count 150 - 450 10e3/uL 172   RBC Count 4.40 - 5.90 10e6/uL 4.49   MCV 78 - 100 fL 91   MCH 26.5 - 33.0 pg 30.1   MCHC 31.5 - 36.5 g/dL 32.9   RDW 10.0 - 15.0 % 12.7   % Neutrophils % 61   % Lymphocytes % 25   % Monocytes % 11   % Eosinophils % 2   % Basophils % 1   Absolute Basophils 0.0 - 0.2 10e3/uL 0.0   Absolute Eosinophils 0.0 - 0.7 10e3/uL 0.1   Absolute Immature Granulocytes <=0.4 10e3/uL 0.0   Absolute Lymphocytes 0.8 - 5.3 10e3/uL 1.0   Absolute Monocytes 0.0 - 1.3 10e3/uL 0.5   % Immature Granulocytes % 0   Absolute Neutrophils 1.6 - 8.3 10e3/uL 2.4   % Retic 0.5 - 2.0 % 0.5   Absolute Retic 0.025 - 0.095 10e6/uL 0.023 (L)   (L): Data is abnormally low      Performing Labs       The technical component of this testing was completed at [facility], [facility] and [facility]     CBC with platelets and differential    Collection Time: 02/05/25  2:03 PM   Result Value Ref Range    WBC Count 4.0 4.0 - 11.0 10e3/uL    RBC Count 4.49 4.40 - 5.90 10e6/uL    Hemoglobin 13.5 13.3 - 17.7 g/dL    Hematocrit 41.0 40.0 - 53.0 %    MCV 91 78 - 100 fL    MCH 30.1 26.5 - 33.0 pg    MCHC 32.9 31.5 - 36.5 g/dL    RDW 12.7 10.0 - 15.0 %    Platelet Count 172 150 - 450 10e3/uL    % Neutrophils 61 %    % Lymphocytes 25 %    % Monocytes 11 %    % Eosinophils 2 %    % Basophils 1 %    % Immature Granulocytes 0 %    Absolute Neutrophils 2.4 1.6 - 8.3 10e3/uL    Absolute Lymphocytes 1.0 0.8 - 5.3 10e3/uL    Absolute Monocytes 0.5 0.0 - 1.3 10e3/uL    Absolute Eosinophils 0.1 0.0 - 0.7 10e3/uL    Absolute Basophils 0.0 0.0 - 0.2 10e3/uL    Absolute Immature Granulocytes 0.0 <=0.4 10e3/uL   Reticulocyte count    Collection Time: 02/05/25  2:03 PM   Result Value Ref Range    % Reticulocyte 0.5 0.5 - 2.0 %    Absolute Reticulocyte 0.023 (L) 0.025 - 0.095 10e6/uL         Imaging Results    CT Abdomen Pelvis w  Contrast    Result Date: 1/31/2025  EXAM: CT ABDOMEN AND PELVIS WITH CONTRAST [facility]ATION: [facility] DATE: 01/31/2025 INDICATION: Abdominal pain. COMPARISON: None. TECHNIQUE: CT scan of the abdomen and pelvis was performed following injection of IV contrast. Multiplanar reformats were obtained. Dose reduction techniques were used. CONTRAST: 92 mL Isovue 370. FINDINGS: LOWER CHEST: Small nodules are seen in both lung bases measuring up to 5 mm in size. HEPATOBILIARY: Subcentimeter low-attenuation lesion in the left hepatic lobe on image 56 of series 3 is too small to definitely characterize, but statistically likely represents a cyst or hemangioma. The portal vein is patent. PANCREAS: Normal. SPLEEN: Normal. ADRENAL GLANDS: Normal. KIDNEYS/BLADDER: No enhancing mass or hydronephrosis on either side. The prostate is enlarged and indents the underside of the bladder. BOWEL: Normal. LYMPH NODES: There are several prominent to mildly enlarged central mesenteric lymph nodes with the largest measuring 13 mm in short axis on image 95. No evidence for inflammatory change or free fluid. VASCULATURE: Incidental note is made of a retroaortic left renal vein. PELVIC ORGANS: The prostate is enlarged and indents the underside of the bladder. No free fluid or adenopathy. MUSCULOSKELETAL: Mild changes are noted through the spine.     IMPRESSION: 1.  Prominent to mildly enlarged central mesenteric lymph nodes of uncertain etiology. No inflammatory change or mass through the abdomen and pelvis. Follow-up CT would be recommended in 3-6 months as a lymphoproliferative disorder cannot be excluded. 2.  Multiple small nodules in both lung bases measuring up to 5 mm in size. Follow-up may be recommended as outlined below. If the patient has a history of smoking, malignancy, or other risk factors, follow-up CT would be recommended in one year. 3.  Enlarged prostate indents the underside of the bladder.        I  spent 60 minutes on the patient's visit today.  This included preparation for the visit, face-to-face time with the patient and documentation following the visit.  It did not include teaching or procedure time.    Signed by: Peter E. Friedell, MD

## 2025-02-19 NOTE — LETTER
"2/19/2025      Rangel Singh  [id number]  [facility] [id number]      Dear Colleague,    Thank you for referring your patient, Rangel Singh, to the [facility] [place]. Please see a copy of my visit note below.    Oncology Rooming Note    February 19, 2025 3:04 PM   Rangel Singh is a 59 year old male who presents for:    Chief Complaint   Patient presents with     Hematology     Leukopenia, unspecified type - New consult     Initial Vitals: /59 (BP Location: Right arm, Patient Position: Sitting, Cuff Size: Adult Regular)   Pulse 66   Temp 97.6  F (36.4  C) (Tympanic)   Resp 12   Ht 1.797 m (5' 10.75\")   Wt 87.1 kg (192 lb)   SpO2 95%   BMI 26.97 kg/m   Estimated body mass index is 26.97 kg/m  as calculated from the following:    Height as of this encounter: 1.797 m (5' 10.75\").    Weight as of this encounter: 87.1 kg (192 lb). Body surface area is 2.09 meters squared.  No Pain (0) Comment: Data Unavailable   No LMP for male patient.  Allergies reviewed: Yes  Medications reviewed: Yes    Medications: Medication refills not needed today.  Pharmacy name entered into Fastnote:    [facility] [address] [place] [address] PHARMACY [place] [id number] [address] [place] [address]    Frailty Screening:   Is the patient here for a new oncology consult visit in cancer care? 2. No    PHQ9:  Did this patient require a PHQ9?: No      Clinical concerns:  New consult      Kristina Ambriz [facility] Hematology and Oncology Consult Note    Patient: Rangel Singh  MRN: [id number]  Date of Service: Feb 19, 2025       Reason for Visit    I was consulted by   Juan Allen DO     For evaluation of leukopenia.      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  CBC on 1/31/2025 showed mild anemia and thrombocytopenia  However blood morphology on 2/5/2025 with a CBC was entirely normal.  Imaging of the abdomen on 1/31/2025 shows some borderline lymphadenopathy.  Patient does note he gets short of breath easily now for the last year or so.  He was formerly a marathon runner.  EKG shows left bundle branch block.  Advised referral to cardiology as incipient heart failure may explain his dyspnea on exertion.  He also notes queasy stomach after eating.  This may reflect diabetic gastroparesis.  He has a history of pulmonary nodules.  I will add a CT of the chest scheduled CT of the abdomen and pelvis for 5/5/2025.  He should follow-up with his primary care with blood counts every 3 to 4 months to monitor for stability.  Follow-up with hematology as needed      ______________________________________________________________________________    History of Present Illness    Mr. Rangel Singh is a 59 year old 59-year-old man with a history of type 1 diabetes.  Up until 2 years ago he was running marathons.  Then he fairly quickly became short of breath on exertion and now feels out of breath even after walking 1 block.  A previous electrocardiogram shows left bundle branch block.      In October 2024 he had anaplasmosis which responded well to doxycycline.  However since then he has had issues with cytopenias.  However he has not had the symptoms and on repeat testing his cytopenias have always resolved.  Recent CT of the abdomen showed possible lymphadenopathy.  A follow-up scan is from May 2025.  He also has a history of pulmonary nodules.    Presently he feels fairly well.  He is  "maintaining his weight.  He does not have chills, fevers or sweats.  He takes Flomax for prostate symptoms.  He notes that his stomach feels upset after eating.  He is a retired .  There is no family history of blood disorders he does not smoke or use alcohol to excess.    Review of systems.  Pertinent Findings are included in the History of Present Illness    Physical Exam    /59 (BP Location: Right arm, Patient Position: Sitting, Cuff Size: Adult Regular)   Pulse 66   Temp 97.6  F (36.4  C) (Tympanic)   Resp 12   Ht 1.797 m (5' 10.75\")   Wt 87.1 kg (192 lb)   SpO2 95%   BMI 26.97 kg/m       GENERAL APPEARANCE: Healthy appearing man in no apparent distress.  HEAD: Atraumatic; normocephalic; without lesions.  EYES: Conjunctiva, corneas and eyelids normal; pupils equal, round, reactive to light; No Icterus.  MOUTH/OROPHARYNX: Oral mucosa intact  NECK: Supple with no nodes.  LUNGS:  Clear to auscultation and percussion with no extra sounds.  HEART: Regular rhythm and rate; S1 and S2 normal; no murmurs noted.  ABDOMEN: Soft; no masses or tenderness, no hepatosplenomegaly.  NEUROLOGIC: Alert and oriented.  No obvious focal findings.  EXTREMITIES: No cyanosis, or edema.  SKIN: No abnormal bruising or bleeding. No suspicious lesions noted on exposed skin.  PSYCHIATRIC: Mental status normal; no apparent psychiatric issues    Medications:    Current Outpatient Medications   Medication Sig Dispense Refill     blood glucose (NO BRAND SPECIFIED) test strip Check BS 4-5x/day.  Dispense 3 month supply. Accu-Check Compact Plus. 510 each 11     Cholecalciferol (VITAMIN D) 50 MCG (2000 UT) CAPS        ezetimibe (ZETIA) 10 MG tablet Take 1 tablet by mouth every morning       insulin glargine (LANTUS VIAL) 100 UNIT/ML vial INJECT 2 TO 5 UNITS SUBCUTANEOUSLY AT BEDTIME 10 mL 11     insulin lispro (HUMALOG) 100 UNIT/ML vial INJECT 2-3 UNITS IN THE MORNING, 2-3 AT LUNCH, AND 2-4 IN THE EVENING AND SLIDING SCALE. " "ESTIMATED MAXIMUM UNITS DAILY IS 25 UNITS PER DAY 10 mL 11     insulin syringe-needle U-100 (BD VEO INSULIN SYRINGE U/F) 31G X 15/64\" 0.3 ML Use 4 syringes daily or as directed. 120 each 11     insulin syringes, disposable, U-100 0.3 ML MISC 1 Device 4 times daily 100 each 11     rosuvastatin (CRESTOR) 40 MG tablet Take 1 tablet (40 mg) by mouth daily. Profile 90 tablet 3     tamsulosin (FLOMAX) 0.4 MG capsule Take 1 capsule (0.4 mg) by mouth daily. 90 capsule 3     TRUEPLUS LANCETS 33G MISC 1 Device 4 times daily 100 each 11     glucagon (GLUCAGON EMERGENCY) 1 MG kit Inject 1 mg into the muscle once for 1 dose 1 mg 11     Current Facility-Administered Medications   Medication Dose Route Frequency Provider Last Rate Last Admin     bupivacaine 0.5 % -  EPINEPHrine 1:200,000 injection 30 mL  30 mL Intradermal On Call to OR Stephan Fregoso MD         dexamethasone PF (DECADRON) injection 4 mg  4 mg Intramuscular On Call to OR Stephan Fregoso MD               Past History    Past Medical History:   Diagnosis Date     CELLULITIS NOS     left danny-auricular 2004     Facial cellulitis 08/11/2014     Type 1 diabetes mellitus (H)      Past Surgical History:   Procedure Laterality Date     COLONOSCOPY  1/3/2014    Procedure: COMBINED COLONOSCOPY, SINGLE BIOPSY/POLYPECTOMY BY BIOPSY;  Colonoscopy;  Surgeon: Ed Galvez MD;  Location: [facility]     COLONOSCOPY N/A 2/26/2024    Procedure: Colonoscopy;  Surgeon: Jose F Vance MD;  Location: [facility]     DISCECTOMY LUMBAR POSTERIOR MICROSCOPIC ONE LEVEL Right 2/17/2023    Procedure: Right Lumbar 4-5 Microdiscectomy;  Surgeon: Stephan Fregoso MD;  Location:  OR     EXAM UNDER ANESTHESIA, MANIPULATE JOINT (LOCATION) Left 3/30/2016    Procedure: EXAM UNDER ANESTHESIA, MANIPULATE JOINT (LOCATION);  Surgeon: Justen Ford MD;  Location: [facility]     SURGICAL HISTORY OF -   08/97    appendectomy ([place], [place])      Allergies   Allergen Reactions     Nkda [No " Known Drug Allergy]      Family History   Problem Relation Age of Onset     ADRIANA Father         50s      Hypertension Father      Atrial fibrillation Father      Hypertension Mother      Cerebrovascular Disease Mother      Atrial fibrillation Mother      Cancer Maternal Grandfather         ?throat/lung cancer     Cerebrovascular Disease Maternal Grandmother      Arthritis Sister         RA     ADRIANA Brother      Atrial fibrillation Brother      Circulatory Other         2 cousins on maternal side .w anurysms     Cancer - colorectal No family hx of      Diabetes No family hx of      Social History     Socioeconomic History     Marital status:      Spouse name: None     Number of children: None     Years of education: None     Highest education level: None   Occupational History     Occupation: [employer]     Employer: [employer]   Tobacco Use     Smoking status: Never     Smokeless tobacco: Never   Vaping Use     Vaping status: Never Used   Substance and Sexual Activity     Alcohol use: Yes     Comment: occ. 2-3 times a month     Drug use: No     Sexual activity: Yes     Partners: Female   Other Topics Concern     Parent/sibling w/ CABG, MI or angioplasty before 65F 55M? No     Social Drivers of Health     Financial Resource Strain: Low Risk  (11/15/2024)    Financial Resource Strain      Within the past 12 months, have you or your family members you live with been unable to get utilities (heat, electricity) when it was really needed?: No   Food Insecurity: Low Risk  (11/15/2024)    Food Insecurity      Within the past 12 months, did you worry that your food would run out before you got money to buy more?: No      Within the past 12 months, did the food you bought just not last and you didn t have money to get more?: No   Transportation Needs: Low Risk  (11/15/2024)    Transportation Needs      Within the past 12 months, has lack of transportation kept you from medical appointments, getting your  medicines, non-medical meetings or appointments, work, or from getting things that you need?: No   Physical Activity: Sufficiently Active (11/15/2024)    Exercise Vital Sign      Days of Exercise per Week: 6 days      Minutes of Exercise per Session: 60 min   Stress: No Stress Concern Present (11/15/2024)    [employer] [place] [employer] - Occupational Stress Questionnaire      Feeling of Stress : Not at all   Social Connections: Unknown (11/15/2024)    Social Connection and Isolation Panel [NHANES]      Frequency of Social Gatherings with Friends and Family: Patient declined   Interpersonal Safety: Low Risk  (11/20/2024)    Interpersonal Safety      Do you feel physically and emotionally safe where you currently live?: Yes      Within the past 12 months, have you been hit, slapped, kicked or otherwise physically hurt by someone?: No      Within the past 12 months, have you been humiliated or emotionally abused in other ways by your partner or ex-partner?: No   Housing Stability: Low Risk  (11/15/2024)    Housing Stability      Do you have housing? : Yes      Are you worried about losing your housing?: No           Lab Results    Recent Results (from the past 720 hours)   Glucose by meter    Collection Time: 01/31/25  8:02 AM   Result Value Ref Range    GLUCOSE BY METER POCT 166 (H) 70 - 99 mg/dL   Ketone Beta-Hydroxybutyrate Quantitative    Collection Time: 01/31/25  8:37 AM   Result Value Ref Range    Ketone (Beta-Hydroxybutyrate) Quantitative 0.35 (H) <=0.30 mmol/L   Comprehensive metabolic panel    Collection Time: 01/31/25  8:37 AM   Result Value Ref Range    Sodium 140 135 - 145 mmol/L    Potassium 4.1 3.4 - 5.3 mmol/L    Carbon Dioxide (CO2) 26 22 - 29 mmol/L    Anion Gap 9 7 - 15 mmol/L    Urea Nitrogen 27.3 (H) 8.0 - 23.0 mg/dL    Creatinine 0.84 0.67 - 1.17 mg/dL    GFR Estimate >90 >60 mL/min/1.73m2    Calcium 8.5 (L) 8.8 - 10.4 mg/dL    Chloride 105 98 - 107 mmol/L    Glucose 176 (H) 70 - 99  mg/dL    Alkaline Phosphatase 82 40 - 150 U/L    AST 39 0 - 45 U/L    ALT 38 0 - 70 U/L    Protein Total 6.2 (L) 6.4 - 8.3 g/dL    Albumin 3.7 3.5 - 5.2 g/dL    Bilirubin Total 0.7 <=1.2 mg/dL   Blood gas venous    Collection Time: 01/31/25  8:37 AM   Result Value Ref Range    pH Venous 7.42 7.32 - 7.43    pCO2 Venous 42 40 - 50 mm Hg    pO2 Venous 36 25 - 47 mm Hg    Bicarbonate Venous 27 21 - 28 mmol/L    Base Excess/Deficit Venous 2.3 -3.0 - 3.0 mmol/L    FIO2 0     Oxyhemoglobin Venous 68 (L) 70 - 75 %    O2 Sat, Venous 68.9 (L) 70.0 - 75.0 %   Influenza A/B, RSV and SARS-CoV2 PCR (COVID-19) Nasopharyngeal    Collection Time: 01/31/25  8:37 AM    Specimen: Nasopharyngeal; Swab   Result Value Ref Range    Influenza A PCR Negative Negative    Influenza B PCR Negative Negative    RSV PCR Negative Negative    SARS CoV2 PCR Negative Negative   Magnesium    Collection Time: 01/31/25  8:37 AM   Result Value Ref Range    Magnesium 1.7 1.7 - 2.3 mg/dL   Lipase    Collection Time: 01/31/25  8:37 AM   Result Value Ref Range    Lipase 15 13 - 60 U/L   UA with Microscopic reflex to Culture    Collection Time: 01/31/25 10:16 AM    Specimen: Urine, Catheter   Result Value Ref Range    Color Urine Yellow Colorless, Straw, Light Yellow, Yellow    Appearance Urine Clear Clear    Glucose Urine 30 (A) Negative mg/dL    Bilirubin Urine Negative Negative    Ketones Urine 20 (A) Negative mg/dL    Specific Gravity Urine 1.030 1.003 - 1.035    Blood Urine Negative Negative    pH Urine 5.5 5.0 - 7.0    Protein Albumin Urine 10 (A) Negative mg/dL    Urobilinogen Urine Normal Normal, 2.0 mg/dL    Nitrite Urine Negative Negative    Leukocyte Esterase Urine Negative Negative    Mucus Urine Present (A) None Seen /LPF    RBC Urine 1 <=2 /HPF    WBC Urine 1 <=5 /HPF   CBC with platelets and differential    Collection Time: 01/31/25 10:16 AM   Result Value Ref Range    WBC Count 5.6 4.0 - 11.0 10e3/uL    RBC Count 4.06 (L) 4.40 - 5.90 10e6/uL     Hemoglobin 12.3 (L) 13.3 - 17.7 g/dL    Hematocrit 38.5 (L) 40.0 - 53.0 %    MCV 95 78 - 100 fL    MCH 30.3 26.5 - 33.0 pg    MCHC 31.9 31.5 - 36.5 g/dL    RDW 13.6 10.0 - 15.0 %    Platelet Count 126 (L) 150 - 450 10e3/uL    % Neutrophils 88 %    % Lymphocytes 4 %    % Monocytes 7 %    % Eosinophils 0 %    % Basophils 0 %    % Immature Granulocytes 0 %    NRBCs per 100 WBC 0 <1 /100    Absolute Neutrophils 4.9 1.6 - 8.3 10e3/uL    Absolute Lymphocytes 0.2 (L) 0.8 - 5.3 10e3/uL    Absolute Monocytes 0.4 0.0 - 1.3 10e3/uL    Absolute Eosinophils 0.0 0.0 - 0.7 10e3/uL    Absolute Basophils 0.0 0.0 - 0.2 10e3/uL    Absolute Immature Granulocytes 0.0 <=0.4 10e3/uL    Absolute NRBCs 0.0 10e3/uL   EKG 12 lead    Collection Time: 01/31/25 12:35 PM   Result Value Ref Range    Systolic Blood Pressure  mmHg    Diastolic Blood Pressure  mmHg    Ventricular Rate 79 BPM    Atrial Rate 79 BPM    CA Interval 192 ms    QRS Duration 134 ms     ms    QTc 490 ms    P Axis 52 degrees    R AXIS -41 degrees    T Axis 94 degrees    Interpretation ECG       Sinus rhythm  Left axis deviation  Left bundle branch block  Abnormal ECG  When compared with ECG of 17-Feb-2023 12:13,  T wave amplitude has decreased in Lateral leads  Confirmed by SEE ED PROVIDER NOTE FOR, ECG INTERPRETATION ([id number]),  Heather Eller ([id number]) on 2/2/2025 8:11:00 AM     Ferritin    Collection Time: 02/05/25  2:03 PM   Result Value Ref Range    Ferritin 88 31 - 409 ng/mL   Iron and iron binding capacity    Collection Time: 02/05/25  2:03 PM   Result Value Ref Range    Iron 69 61 - 157 ug/dL    Iron Binding Capacity 225 (L) 240 - 430 ug/dL    Iron Sat Index 31 15 - 46 %   Vitamin B12    Collection Time: 02/05/25  2:03 PM   Result Value Ref Range    Vitamin B12 341 232 - 1,245 pg/mL   Hemoglobin A1c    Collection Time: 02/05/25  2:03 PM   Result Value Ref Range    Estimated Average Glucose 131 (H) <117 mg/dL    Hemoglobin A1C 6.2 (H) 0.0 - 5.6 %   Bld morphology  pathology review    Collection Time: 02/05/25  2:03 PM   Result Value Ref Range    Final Diagnosis       A.  Peripheral blood smear for morphology:  - No diagnostic abnormalities identified, normal peripheral blood smear for age and gender.      Comment       The patient's prior anemia and thrombocytopenia from a CBC of 1/31/2025 has resolved in the present study.      Clinical Information       59-year-old male with a prior history of anemia and thrombocytopenia      Peripheral Smear       PERIPHERAL BLOOD DIFFERENTIAL:    The automated differential associated with the CBC for this case was imported from [employer] and was confirmed accurate with a 200 cell count manual differential review of the smear prepared from a blood sample collected 2/5/2025.    PERIPHERAL BLOOD MORPHOLOGY:    ERYTHROCYTES:  The red cells are normocytic, normochromic and normal in number for the patient's age and gender. No significant anisopoikilocytosis is seen. No features of hemolysis or increased polychromasia are identified.  No parasites are identified.    LEUKOCYTES:  The leukocytes are morphologically normal and normal in number. No immature precursors or evidence of neutrophilic dysplasia is seen. No atypical lymphoid cells are seen. No bacteria,  parasites or parasitic inclusions are seen.           PLATELETS:  The platelets appear normal in number and morphology.      Peripheral Hematologic Data        Latest Reference Range & Units 02/05/25 14:03   WBC 4.0 - 11.0 10e3/uL 4.0   Hemoglobin 13.3 - 17.7 g/dL 13.5   Hematocrit 40.0 - 53.0 % 41.0   Platelet Count 150 - 450 10e3/uL 172   RBC Count 4.40 - 5.90 10e6/uL 4.49   MCV 78 - 100 fL 91   MCH 26.5 - 33.0 pg 30.1   MCHC 31.5 - 36.5 g/dL 32.9   RDW 10.0 - 15.0 % 12.7   % Neutrophils % 61   % Lymphocytes % 25   % Monocytes % 11   % Eosinophils % 2   % Basophils % 1   Absolute Basophils 0.0 - 0.2 10e3/uL 0.0   Absolute Eosinophils 0.0 - 0.7 10e3/uL 0.1   Absolute Immature Granulocytes  <=0.4 10e3/uL 0.0   Absolute Lymphocytes 0.8 - 5.3 10e3/uL 1.0   Absolute Monocytes 0.0 - 1.3 10e3/uL 0.5   % Immature Granulocytes % 0   Absolute Neutrophils 1.6 - 8.3 10e3/uL 2.4   % Retic 0.5 - 2.0 % 0.5   Absolute Retic 0.025 - 0.095 10e6/uL 0.023 (L)   (L): Data is abnormally low      Performing Labs       The technical component of this testing was completed at [facility] and [facility]     CBC with platelets and differential    Collection Time: 02/05/25  2:03 PM   Result Value Ref Range    WBC Count 4.0 4.0 - 11.0 10e3/uL    RBC Count 4.49 4.40 - 5.90 10e6/uL    Hemoglobin 13.5 13.3 - 17.7 g/dL    Hematocrit 41.0 40.0 - 53.0 %    MCV 91 78 - 100 fL    MCH 30.1 26.5 - 33.0 pg    MCHC 32.9 31.5 - 36.5 g/dL    RDW 12.7 10.0 - 15.0 %    Platelet Count 172 150 - 450 10e3/uL    % Neutrophils 61 %    % Lymphocytes 25 %    % Monocytes 11 %    % Eosinophils 2 %    % Basophils 1 %    % Immature Granulocytes 0 %    Absolute Neutrophils 2.4 1.6 - 8.3 10e3/uL    Absolute Lymphocytes 1.0 0.8 - 5.3 10e3/uL    Absolute Monocytes 0.5 0.0 - 1.3 10e3/uL    Absolute Eosinophils 0.1 0.0 - 0.7 10e3/uL    Absolute Basophils 0.0 0.0 - 0.2 10e3/uL    Absolute Immature Granulocytes 0.0 <=0.4 10e3/uL   Reticulocyte count    Collection Time: 02/05/25  2:03 PM   Result Value Ref Range    % Reticulocyte 0.5 0.5 - 2.0 %    Absolute Reticulocyte 0.023 (L) 0.025 - 0.095 10e6/uL         Imaging Results    CT Abdomen Pelvis w Contrast    Result Date: 1/31/2025  EXAM: CT ABDOMEN AND PELVIS WITH CONTRAST [facility]ATION: [facility] DATE: 01/31/2025 INDICATION: Abdominal pain. COMPARISON: None. TECHNIQUE: CT scan of the abdomen and pelvis was performed following injection of IV contrast. Multiplanar reformats were obtained. Dose reduction techniques were used. CONTRAST: 92 mL Isovue 370. FINDINGS: LOWER CHEST: Small  nodules are seen in both lung bases measuring up to 5 mm in size. HEPATOBILIARY: Subcentimeter low-attenuation lesion in the left hepatic lobe on image 56 of series 3 is too small to definitely characterize, but statistically likely represents a cyst or hemangioma. The portal vein is patent. PANCREAS: Normal. SPLEEN: Normal. ADRENAL GLANDS: Normal. KIDNEYS/BLADDER: No enhancing mass or hydronephrosis on either side. The prostate is enlarged and indents the underside of the bladder. BOWEL: Normal. LYMPH NODES: There are several prominent to mildly enlarged central mesenteric lymph nodes with the largest measuring 13 mm in short axis on image 95. No evidence for inflammatory change or free fluid. VASCULATURE: Incidental note is made of a retroaortic left renal vein. PELVIC ORGANS: The prostate is enlarged and indents the underside of the bladder. No free fluid or adenopathy. MUSCULOSKELETAL: Mild changes are noted through the spine.     IMPRESSION: 1.  Prominent to mildly enlarged central mesenteric lymph nodes of uncertain etiology. No inflammatory change or mass through the abdomen and pelvis. Follow-up CT would be recommended in 3-6 months as a lymphoproliferative disorder cannot be excluded. 2.  Multiple small nodules in both lung bases measuring up to 5 mm in size. Follow-up may be recommended as outlined below. If the patient has a history of smoking, malignancy, or other risk factors, follow-up CT would be recommended in one year. 3.  Enlarged prostate indents the underside of the bladder.        I spent 60 minutes on the patient's visit today.  This included preparation for the visit, face-to-face time with the patient and documentation following the visit.  It did not include teaching or procedure time.    Signed by: Peter E. Friedell, MD          Again, thank you for allowing me to participate in the care of your patient.        Sincerely,        Peter E. Friedell, MD    Electronically signed

## 2025-02-21 ENCOUNTER — MYC MEDICAL ADVICE (OUTPATIENT)
Dept: FAMILY MEDICINE | Facility: CLINIC | Age: 60
End: 2025-02-21
Payer: COMMERCIAL

## 2025-02-21 DIAGNOSIS — I44.7 LBBB (LEFT BUNDLE BRANCH BLOCK): Primary | ICD-10-CM

## 2025-02-21 DIAGNOSIS — R06.09 DYSPNEA ON EXERTION: ICD-10-CM

## 2025-05-05 ENCOUNTER — HOSPITAL ENCOUNTER (OUTPATIENT)
Dept: CT IMAGING | Facility: CLINIC | Age: 60
Discharge: HOME | End: 2025-05-05
Attending: FAMILY MEDICINE | Admitting: FAMILY MEDICINE
Payer: COMMERCIAL

## 2025-05-05 DIAGNOSIS — R59.9 LYMPH NODE ENLARGEMENT: ICD-10-CM

## 2025-05-05 LAB
CREAT BLD-MCNC: 1 MG/DL (ref 0.7–1.2)
EGFRCR SERPLBLD CKD-EPI 2021: >60 ML/MIN/1.73M2

## 2025-05-05 PROCEDURE — 82565 ASSAY OF CREATININE: CPT

## 2025-05-05 PROCEDURE — 250N000011 HC RX IP 250 OP 636: Performed by: RADIOLOGY

## 2025-05-05 PROCEDURE — 74177 CT ABD & PELVIS W/CONTRAST: CPT

## 2025-05-05 PROCEDURE — 250N000009 HC RX 250: Performed by: RADIOLOGY

## 2025-05-05 RX ORDER — IOPAMIDOL 755 MG/ML
94 INJECTION, SOLUTION INTRAVASCULAR ONCE
Status: COMPLETED | OUTPATIENT
Start: 2025-05-05 | End: 2025-05-05

## 2025-05-05 RX ADMIN — SODIUM CHLORIDE 64 ML: 9 INJECTION, SOLUTION INTRAVENOUS at 08:36

## 2025-05-05 RX ADMIN — IOPAMIDOL 94 ML: 755 INJECTION, SOLUTION INTRAVENOUS at 08:36

## 2025-05-07 ENCOUNTER — OFFICE VISIT (OUTPATIENT)
Dept: CARDIOLOGY | Facility: CLINIC | Age: 60
End: 2025-05-07
Payer: COMMERCIAL

## 2025-05-07 VITALS
HEIGHT: 71 IN | WEIGHT: 188.7 LBS | HEART RATE: 58 BPM | RESPIRATION RATE: 16 BRPM | SYSTOLIC BLOOD PRESSURE: 127 MMHG | DIASTOLIC BLOOD PRESSURE: 69 MMHG | BODY MASS INDEX: 26.42 KG/M2 | OXYGEN SATURATION: 97 %

## 2025-05-07 DIAGNOSIS — R06.09 DYSPNEA ON EXERTION: ICD-10-CM

## 2025-05-07 DIAGNOSIS — I44.7 LBBB (LEFT BUNDLE BRANCH BLOCK): ICD-10-CM

## 2025-05-07 PROCEDURE — 3074F SYST BP LT 130 MM HG: CPT | Performed by: INTERNAL MEDICINE

## 2025-05-07 PROCEDURE — 3078F DIAST BP <80 MM HG: CPT | Performed by: INTERNAL MEDICINE

## 2025-05-07 PROCEDURE — G2211 COMPLEX E/M VISIT ADD ON: HCPCS | Performed by: INTERNAL MEDICINE

## 2025-05-07 PROCEDURE — 99204 OFFICE O/P NEW MOD 45 MIN: CPT | Performed by: INTERNAL MEDICINE

## 2025-05-07 NOTE — LETTER
5/7/2025    Juan ADAIRYolanda Alejandro, DO  [address] [facility] [address]    RE: Rangel Singh       Dear Colleague,     I had the pleasure of seeing Rangel Singh in the [facility].      Cardiology Consultation       Assessment & Plan    1.  Chronic left bundle branch block  2.  Diabetes mellitus type 1  3.  Hyperlipidemia  4.  Thrombocytopenia and leukopenia noted after a tick bite however since improved per patient  5.  Dyspnea on exertion    Recommendations    1.  Dyspnea on exertion: Patient has a chronic left bundle branch block.  We will get an updated echocardiogram.  I will also get a Shabnam scan to exclude the possibility of significant obstructive coronary artery disease as a potential cause.    2.  Patient will follow-up with her cardiology team to go over testing.    Thank you kindly for consult      The longitudinal plan of care for the diagnosis(es)/condition(s) as documented were addressed during this visit. Due to the added complexity in care, I will continue to support Ray in the subsequent management and with ongoing continuity of care.         Janice Hamilton MD, MD        HPI:    Patient is a pleasant 59-year-old gentleman accompanied by his wife to the cardiovascular medicine clinic.  He has notable history as above.  It appears that he has had a left bundle branch block for several years.  He also has diabetes mellitus and is on insulin as a result.  He had a CT scan which demonstrated some form of coronary calcifications.  They were not further clarified beyond that.  Over the last 2 years he has noticed some dyspnea on exertion progressive.  In the past he was able to run marathons and now after a few miles he starts feeling short of breath.  He has not had any changes in his weight or any other significant factors that could be contributing this and is concerned whether cardiac etiology could be causing his symptoms.  Here for evaluation.  He does not have any  chest pain.        Echo: 2023  Left ventricular systolic function is normal.The visual ejection fraction is  55-60%.  The right ventricle is mildly dilated.The right ventricular systolic function  is normal.  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 (H)     Hyperlipidemia LDL goal <70     Erectile dysfunction     Urinary urgency     Adhesive capsulitis of shoulder, left     Type 1 diabetes mellitus with hypoglycemia and without coma (H)     Lumbar back pain with radiculopathy affecting right lower extremity     Lumbar radiculopathy     Herniated lumbar intervertebral disc     Spondylolisthesis, grade 1     Arthritis of right hip     Right hip impingement syndrome     Lymph node enlargement     Thrombocytopenia     Anemia, unspecified type     Left bundle-branch block     Pulmonary nodules       Past Medical History  I have reviewed this patient's medical history and updated it with pertinent information if needed.   Past Medical History:   Diagnosis Date     CELLULITIS NOS     left danny-auricular 2004     Facial cellulitis 08/11/2014     Type 1 diabetes mellitus (H)        Past Surgical History  I have reviewed this patient's surgical history and updated it with pertinent information if needed.  Past Surgical History:   Procedure Laterality Date     COLONOSCOPY  1/3/2014    Procedure: COMBINED COLONOSCOPY, SINGLE BIOPSY/POLYPECTOMY BY BIOPSY;  Colonoscopy;  Surgeon: Ed Galvez MD;  Location: [facility]     COLONOSCOPY N/A 2/26/2024    Procedure: Colonoscopy;  Surgeon: Jose F Vance MD;  Location: [facility]     DISCECTOMY LUMBAR POSTERIOR MICROSCOPIC ONE LEVEL Right 2/17/2023    Procedure: Right Lumbar 4-5 Microdiscectomy;  Surgeon:  Stephan Fregoso MD;  Location: [facility]     EXAM UNDER ANESTHESIA, MANIPULATE JOINT (LOCATION) Left 3/30/2016    Procedure: EXAM UNDER ANESTHESIA, MANIPULATE JOINT (LOCATION);  Surgeon: Justen Ford MD;  Location: [facility]     SURGICAL HISTORY OF -   08/97    appendectomy ([place], [place])        Prior to Admission Medications  Cannot display prior to admission medications because the patient has not been admitted in this contact.     [unfilled]  [unfilled]  Allergies  Allergies   Allergen Reactions     Nkda [No Known Drug Allergy]        Social History   reports that he has never smoked. He has never used smokeless tobacco. He reports current alcohol use. He reports that he does not use drugs.    Family History  Family History   Problem Relation Age of Onset     C.A.D. Father         50s      Hypertension Father      Atrial fibrillation Father      Hypertension Mother      Cerebrovascular Disease Mother      Atrial fibrillation Mother      Cancer Maternal Grandfather         ?throat/lung cancer     Cerebrovascular Disease Maternal Grandmother      Arthritis Sister         RA     C.A.D. Brother      Atrial fibrillation Brother      Circulatory Other         2 cousins on maternal side .w anurysms     Cancer - colorectal No family hx of      Diabetes No family hx of        Review of Systems  The comprehensive 10 point Review of Systems is negative other than noted in the HPI or here.     Physical Exam  Vital Signs with Ranges     Wt Readings from Last 4 Encounters:   02/19/25 87.1 kg (192 lb)   02/05/25 85.7 kg (189 lb)   01/31/25 85.3 kg (188 lb)   11/20/24 81.6 kg (180 lb)     [unfilled]      Vitals: There were no vitals taken for this visit.        @LABRCNTIPR(tropi:5,troponinies:5)@    @LABRCNTIPR(wbc:3,hgb:3,mcv:3,plt:3,inr:3,na:3,potassium:3,chloride:3,co2:3,bun:3,cr:3,gfrestimated:3,gfrestblack:3,aniongap:3,juvencio:3,glc:3,albumin:2,prottotal:2,bilitotal:2,alkphos:2,alt:2,ast:2,lipase:2,tropi:3)@  Recent  Labs   Lab Test 23  0735 23  0706 21  1200 21  0000   CHOL 172  --   --  182   HDL 53  --   --  64   *  --   --  110   TRIG 42 39   < > 39    < > = values in this interval not displayed.     @LABRCNTIP(wbc:3,hgb:3,hct:3,mcv:3,plt:3,iron:3,ironsat:3,reticabsct:3,retp:3,feb:3,kristin:3,b12:3,folic:3,epoe:3,morph:3)@  @LABRCNTIP(PH:3,PHV:3,PO2:3,PO2V:3,sat:3,PCO2:3,PCO2V:3,HCO3:3,HCO3V:3)@  @LABRCNTIP(NTBNPI:3,NTBNP:3)@  @LABRCNTIP(DD:1)@  @LABRCNTIP(sed:3,crp:3)@  @LABRCNTIP(PLT:3)@  @LABRCNTIP(TSH:3)@  @LABRCNTIP(color:1,appearance:1,urineg,urinebili:1,urineketone:1,s,ubld:1,urineph:1,protein:1,urobilinogen:1,nitrite:1,leukest:1,rbcu:1,wbcu:1)@    Imaging:  No results found for this or any previous visit (from the past 48 hours).    Echo:  No results found for this or any previous visit (from the past 4320 hours).    Clinically Significant Risk Factors Present on Admission                                      The longitudinal plan of care for the diagnosis(es)/condition(s) as documented were addressed during this visit. Due to the added complexity in care, I will continue to support Ray in the subsequent management and with ongoing continuity of care.             Thank you for allowing me to participate in the care of your patient.      Sincerely,     Janice Hamilton MD     [facility]  cc:   Juan Allen, DO  [address] [place] [address]

## 2025-05-07 NOTE — PROGRESS NOTES
Cardiology Consultation       Assessment & Plan     1.  Chronic left bundle branch block  2.  Diabetes mellitus type 1  3.  Hyperlipidemia  4.  Thrombocytopenia and leukopenia noted after a tick bite however since improved per patient  5.  Dyspnea on exertion    Recommendations    1.  Dyspnea on exertion: Patient has a chronic left bundle branch block.  We will get an updated echocardiogram.  I will also get a Shabnam scan to exclude the possibility of significant obstructive coronary artery disease as a potential cause.    2.  Patient will follow-up with her cardiology team to go over testing.    Thank you kindly for consult      The longitudinal plan of care for the diagnosis(es)/condition(s) as documented were addressed during this visit. Due to the added complexity in care, I will continue to support Ray in the subsequent management and with ongoing continuity of care.         Janice Hamilton MD, MD        HPI:    Patient is a pleasant 59-year-old gentleman accompanied by his wife to the cardiovascular medicine clinic.  He has notable history as above.  It appears that he has had a left bundle branch block for several years.  He also has diabetes mellitus and is on insulin as a result.  He had a CT scan which demonstrated some form of coronary calcifications.  They were not further clarified beyond that.  Over the last 2 years he has noticed some dyspnea on exertion progressive.  In the past he was able to run marathons and now after a few miles he starts feeling short of breath.  He has not had any changes in his weight or any other significant factors that could be contributing this and is concerned whether cardiac etiology could be causing his symptoms.  Here for evaluation.  He does not have any chest pain.        Echo: 2023  Left ventricular systolic function is normal.The visual ejection fraction is  55-60%.  The right ventricle is mildly dilated.The right ventricular systolic function  is  normal.  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 (H)    Hyperlipidemia LDL goal <70    Erectile dysfunction    Urinary urgency    Adhesive capsulitis of shoulder, left    Type 1 diabetes mellitus with hypoglycemia and without coma (H)    Lumbar back pain with radiculopathy affecting right lower extremity    Lumbar radiculopathy    Herniated lumbar intervertebral disc    Spondylolisthesis, grade 1    Arthritis of right hip    Right hip impingement syndrome    Lymph node enlargement    Thrombocytopenia    Anemia, unspecified type    Left bundle-branch block    Pulmonary nodules       Past Medical History   I have reviewed this patient's medical history and updated it with pertinent information if needed.   Past Medical History:   Diagnosis Date    CELLULITIS NOS     left danny-auricular 2004    Facial cellulitis 08/11/2014    Type 1 diabetes mellitus (H)        Past Surgical History   I have reviewed this patient's surgical history and updated it with pertinent information if needed.  Past Surgical History:   Procedure Laterality Date    COLONOSCOPY  1/3/2014    Procedure: COMBINED COLONOSCOPY, SINGLE BIOPSY/POLYPECTOMY BY BIOPSY;  Colonoscopy;  Surgeon: Ed Galvez MD;  Location: [facility]    COLONOSCOPY N/A 2/26/2024    Procedure: Colonoscopy;  Surgeon: Jose F Vance MD;  Location: [facility]    DISCECTOMY LUMBAR POSTERIOR MICROSCOPIC ONE LEVEL Right 2/17/2023    Procedure: Right Lumbar 4-5 Microdiscectomy;  Surgeon: Stephan Fregoso MD;  Location:  OR    EXAM UNDER ANESTHESIA, MANIPULATE JOINT (LOCATION) Left 3/30/2016    Procedure: EXAM UNDER ANESTHESIA, MANIPULATE JOINT (LOCATION);  Surgeon: Justen Ford MD;  Location: [facility]     SURGICAL HISTORY OF -   08/97    appendectomy ([place], [place])        Prior to Admission Medications   Cannot display prior to admission medications because the patient has not been admitted in this contact.     [unfilled]  [unfilled]  Allergies   Allergies   Allergen Reactions    Nkda [No Known Drug Allergy]        Social History    reports that he has never smoked. He has never used smokeless tobacco. He reports current alcohol use. He reports that he does not use drugs.    Family History   Family History   Problem Relation Age of Onset    C.A.D. Father         50s     Hypertension Father     Atrial fibrillation Father     Hypertension Mother     Cerebrovascular Disease Mother     Atrial fibrillation Mother     Cancer Maternal Grandfather         ?throat/lung cancer    Cerebrovascular Disease Maternal Grandmother     Arthritis Sister         RA    MALI. Brother     Atrial fibrillation Brother     Circulatory Other         2 cousins on maternal side .w anurysms    Cancer - colorectal No family hx of     Diabetes No family hx of        Review of Systems   The comprehensive 10 point Review of Systems is negative other than noted in the HPI or here.     Physical Exam   Vital Signs with Ranges     Wt Readings from Last 4 Encounters:   02/19/25 87.1 kg (192 lb)   02/05/25 85.7 kg (189 lb)   01/31/25 85.3 kg (188 lb)   11/20/24 81.6 kg (180 lb)     [unfilled]      Vitals: There were no vitals taken for this visit.        @LABRCNTIPR(tropi:5,troponinies:5)@    @LABRCNTIPR(wbc:3,hgb:3,mcv:3,plt:3,inr:3,na:3,potassium:3,chloride:3,co2:3,bun:3,cr:3,gfrestimated:3,gfrestblack:3,aniongap:3,juvencio:3,glc:3,albumin:2,prottotal:2,bilitotal:2,alkphos:2,alt:2,ast:2,lipase:2,tropi:3)@  Recent Labs   Lab Test 04/06/23  0735 01/05/23  0706 07/05/21  1200 01/04/21  0000   CHOL 172  --   --  182   HDL 53  --   --  64   *  --   --  110   TRIG 42 39   < > 39    < > = values in this interval not displayed.      @LABRCNTIP(wbc:3,hgb:3,hct:3,mcv:3,plt:3,iron:3,ironsat:3,reticabsct:3,retp:3,feb:3,kristin:3,b12:3,folic:3,epoe:3,morph:3)@  @LABRCNTIP(PH:3,PHV:3,PO2:3,PO2V:3,sat:3,PCO2:3,PCO2V:3,HCO3:3,HCO3V:3)@  @LABRCNTIP(NTBNPI:3,NTBNP:3)@  @LABRCNTIP(DD:1)@  @LABRCNTIP(sed:3,crp:3)@  @LABRCNTIP(PLT:3)@  @LABRCNTIP(TSH:3)@  @LABRCNTIP(color:1,appearance:1,urineg,urinebili:1,urineketone:1,s,ubld:1,urineph:1,protein:1,urobilinogen:1,nitrite:1,leukest:1,rbcu:1,wbcu:1)@    Imaging:  No results found for this or any previous visit (from the past 48 hours).    Echo:  No results found for this or any previous visit (from the past 4320 hours).    Clinically Significant Risk Factors Present on Admission                                       The longitudinal plan of care for the diagnosis(es)/condition(s) as documented were addressed during this visit. Due to the added complexity in care, I will continue to support Ray in the subsequent management and with ongoing continuity of care.

## 2025-06-09 ENCOUNTER — HOSPITAL ENCOUNTER (OUTPATIENT)
Dept: NUCLEAR MEDICINE | Facility: CLINIC | Age: 60
Setting detail: NUCLEAR MEDICINE
Discharge: HOME | End: 2025-06-09
Attending: INTERNAL MEDICINE
Payer: COMMERCIAL

## 2025-06-09 ENCOUNTER — HOSPITAL ENCOUNTER (OUTPATIENT)
Dept: CARDIOLOGY | Facility: CLINIC | Age: 60
Discharge: HOME | End: 2025-06-09
Attending: INTERNAL MEDICINE
Payer: COMMERCIAL

## 2025-06-09 ENCOUNTER — HOSPITAL ENCOUNTER (OUTPATIENT)
Dept: CARDIOLOGY | Facility: CLINIC | Age: 60
Setting detail: NUCLEAR MEDICINE
Discharge: HOME | End: 2025-06-09
Attending: INTERNAL MEDICINE
Payer: COMMERCIAL

## 2025-06-09 VITALS — BODY MASS INDEX: 26.32 KG/M2 | WEIGHT: 188 LBS | HEIGHT: 71 IN

## 2025-06-09 DIAGNOSIS — I44.7 LBBB (LEFT BUNDLE BRANCH BLOCK): ICD-10-CM

## 2025-06-09 LAB
CV BLOOD PRESSURE: 55 MMHG
CV STRESS MAX HR HE: 80
LVEF ECHO: NORMAL
NUC STRESS EJECTION FRACTION: 58 %
RATE PRESSURE PRODUCT: NORMAL
STRESS ECHO BASELINE DIASTOLIC HE: 76
STRESS ECHO BASELINE HR: 56 BPM
STRESS ECHO BASELINE SYSTOLIC BP: 142
STRESS ECHO CALCULATED PERCENT HR: 51 %
STRESS ECHO LAST STRESS DIASTOLIC BP: 74
STRESS ECHO LAST STRESS SYSTOLIC BP: 140
STRESS ECHO TARGET HR: 158

## 2025-06-09 PROCEDURE — 93016 CV STRESS TEST SUPVJ ONLY: CPT | Performed by: INTERNAL MEDICINE

## 2025-06-09 PROCEDURE — A9502 TC99M TETROFOSMIN: HCPCS | Performed by: INTERNAL MEDICINE

## 2025-06-09 PROCEDURE — 93018 CV STRESS TEST I&R ONLY: CPT | Performed by: INTERNAL MEDICINE

## 2025-06-09 PROCEDURE — 93306 TTE W/DOPPLER COMPLETE: CPT | Mod: 26 | Performed by: INTERNAL MEDICINE

## 2025-06-09 PROCEDURE — 343N000001 HC RX 343 MED OP 636: Performed by: INTERNAL MEDICINE

## 2025-06-09 PROCEDURE — 93306 TTE W/DOPPLER COMPLETE: CPT

## 2025-06-09 PROCEDURE — 78452 HT MUSCLE IMAGE SPECT MULT: CPT

## 2025-06-09 PROCEDURE — 78452 HT MUSCLE IMAGE SPECT MULT: CPT | Mod: 26 | Performed by: INTERNAL MEDICINE

## 2025-06-09 PROCEDURE — 93017 CV STRESS TEST TRACING ONLY: CPT

## 2025-06-09 PROCEDURE — 250N000011 HC RX IP 250 OP 636: Mod: JZ | Performed by: INTERNAL MEDICINE

## 2025-06-09 RX ORDER — REGADENOSON 0.08 MG/ML
0.4 INJECTION, SOLUTION INTRAVENOUS ONCE
Status: COMPLETED | OUTPATIENT
Start: 2025-06-09 | End: 2025-06-09

## 2025-06-09 RX ADMIN — REGADENOSON 0.4 MG: 0.08 INJECTION, SOLUTION INTRAVENOUS at 09:35

## 2025-06-09 RX ADMIN — TETROFOSMIN 32.5 MILLICURIE: 1.38 INJECTION, POWDER, LYOPHILIZED, FOR SOLUTION INTRAVENOUS at 09:40

## 2025-06-09 RX ADMIN — TETROFOSMIN 10.1 MILLICURIE: 1.38 INJECTION, POWDER, LYOPHILIZED, FOR SOLUTION INTRAVENOUS at 07:45

## 2025-06-10 ENCOUNTER — RESULTS FOLLOW-UP (OUTPATIENT)
Dept: CARDIOLOGY | Facility: CLINIC | Age: 60
End: 2025-06-10

## 2025-06-11 NOTE — PROGRESS NOTES
~Cardiology Clinic Visit~    Primary Cardiologist: Dr. Hamilton  Reason for visit: testing follow up    History of Present Illness    Rangel Singh is a very pleasant 60 year old male with a past medical history notable for chronic left bundle branch block, type 1 diabetes mellitus, hyperlipidemia, thrombocytopenia and leukopenia after tick bite, since improved.  Pt carries family history of CAD with CHF, MI/PCI in first degree relatives.    In brief, patient presented to cardiology clinic in on exertion.  He has a history of left bundle branch block that has been present for several years.  With his diabetes he has been on low-dose term insulin.  He has had a prior CT scan which demonstrated coronary calcifications.  This was not clarified beyond that.  From a symptom standpoint, he noticed that over the last 2 years he has had some dyspnea on exertion that has been progressive.  In the past he has been able to run marathons, and now after just a few miles of exertion he starts to feel short of breath.  He endorses no significant changes in his weight or any other significant factors that could be contributing to this.  Based on these new symptoms, he was concerned and therefore elected to have this cardiology evaluation.  He denies any erick chest pain.    An echocardiogram from 2023 showed EF 55-60% with mildly dilated RV and normal RV function.  Normal IVC.    Based on his symptoms and history, he was recommended update echocardiogram, Lexiscan stress test to exclude the possibility of significant as a possible cause of symptoms    Diagnostics:  6/9/2025 echocardiogram: EF 55%, no wall motion abnormalities.  Normal RV.  No hemodynamically significant valvular disease.  Normal aortic dimensions.  No significant change from prior study.    6/9/2025 Lexiscan stress test: Study was negative for inducible myocardial ischemia or infarct.  Small mild in the anterior segment is better at stress versus  rest which likely represents artifact.  At rest 55%, at stress 50%.  EKG portion of the exam was negative for inducible ischemic EKG changes.  There were occasional PACs during stress.    Testing reviewed, today.  Patient doing well.  No CP, HF symptoms, edema, palpitations, SOB at rest.  __________________________________________________________________         Assessment and Impression:     Dyspnea on exertion  Chronic left bundle-branch block  Diabetes mellitus type 1, insulin-dependent  Hyperlipidemia, history of thrombocytopenia and leukopenia, resolved         Recommendations and Plan:     No changes today from a cardiac standpoint.  Reassuring testing.  Ongoing healthy lifestyle - daily activity, mediterranean style diet.  Routine follow up in 1-year.  __________________________________________________________________    Thank you for the opportunity to participate in this pleasant patient's care.    We would be happy to see this patient sooner for any concerns in the meantime.    LEXI Hernández, CNP, CCK   Nurse Practitioner  [facility] [place]    Today's clinic visit entailed:  The following tests were independently interpreted by me as noted in my documentation: labs, stress test, echo  Prescription drug management  The level of medical decision making during this visit was of moderate complexity.  Review of the result(s) of each unique test - cardiac testing, cardiac imaging, labs  Care everywhere reviewed for additional records to facilitate comprehensive patient care.  Recent hospitalization records and notes reviewed to facilitate comprehensive care coordination.    Orders this Visit:  Orders Placed This Encounter   Procedures    Follow-Up with Cardiology- LUIS     No orders of the defined types were placed in this encounter.    There are no discontinued medications.  Encounter Diagnoses   Name Primary?    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  Dispense 3 month supply. Accu-Check Compact Plus. 510 each 11    Cholecalciferol (VITAMIN D) 50 MCG (2000 UT) CAPS       ezetimibe (ZETIA) 10 MG tablet Take 1 tablet by mouth every morning      glucagon (GLUCAGON EMERGENCY) 1 MG kit Inject 1 mg into the muscle once for 1 dose 1 mg 11    insulin glargine (LANTUS VIAL) 100 UNIT/ML vial INJECT 2 TO 5 UNITS SUBCUTANEOUSLY AT BEDTIME 10 mL 11    insulin lispro (HUMALOG) 100 UNIT/ML vial INJECT 2-3 UNITS IN THE MORNING, 2-3 AT LUNCH, AND 2-4 IN THE EVENING AND SLIDING SCALE. ESTIMATED MAXIMUM UNITS DAILY IS 25 UNITS PER DAY 10 mL 11    insulin syringe-needle U-100 (BD VEO INSULIN SYRINGE U/F) 31G X 15/64\" 0.3 ML Use 4 syringes daily or as directed. 120 each 11    insulin syringes, disposable, U-100 0.3 ML MISC 1 Device 4 times daily 100 each 11    rosuvastatin (CRESTOR) 40 MG tablet Take 1 tablet (40 mg) by mouth daily. Profile 90 tablet 3    tamsulosin (FLOMAX) 0.4 MG capsule Take 1 capsule (0.4 mg) by mouth daily. 90 capsule 3    TRUEPLUS LANCETS 33G MISC 1 Device 4 times daily 100 each 11     ALLERGIES     Allergies   Allergen Reactions    Nkda [No Known Drug Allergy]      PAST MEDICAL, SURGICAL, FAMILY, SOCIAL HISTORY:  History was reviewed and updated as needed, see medical record.    Review of Systems:  A 10-point Review Of Systems is otherwise normal except for that which is noted in the HPI and interval summary.    Physical Exam:    Vitals: /66   Pulse 58   Resp 16   Ht 1.803 m (5' 11\")   Wt 83.1 kg (183 lb 3.2 oz)   SpO2 98%   BMI 25.55 kg/m    Constitutional: Appears stated age, well nourished, NAD.  Respiratory: Non-labored. Lungs clear  Cardiovascular: " RRR, normal S1 and S2. No murmur.  No edema.  GI: Soft, non-distended, non-tender.  Skin: Warm and dry.   Musculoskeletal/Extremities: Symmetrical movement.  Neurologic: No gross focal deficits. Alert, awake.  Psychiatric: Affect appropriate. Mentation normal.    Recent Lab Results:  LIPID RESULTS:  Lab Results   Component Value Date    CHOL 172 04/06/2023    CHOL 182 01/04/2021    HDL 53 04/06/2023    HDL 64 01/04/2021     (H) 04/06/2023     01/04/2021    TRIG 42 04/06/2023    TRIG 39 01/05/2023    TRIG 39 01/04/2021    CHOLHDLRATIO 2.6 10/24/2015     LIVER ENZYME RESULTS:  Lab Results   Component Value Date    AST 39 01/31/2025    AST 25 01/11/2020    ALT 38 01/31/2025    ALT 31 01/11/2020     CBC RESULTS:  Lab Results   Component Value Date    WBC 4.0 02/05/2025    WBC 3.1 (L) 01/11/2020    RBC 4.49 02/05/2025    RBC 4.67 01/11/2020    HGB 13.5 02/05/2025    HGB 14.4 01/11/2020    HCT 41.0 02/05/2025    HCT 45.0 01/11/2020    MCV 91 02/05/2025    MCV 96 01/11/2020    MCH 30.1 02/05/2025    MCH 30.8 01/11/2020    MCHC 32.9 02/05/2025    MCHC 32.0 01/11/2020    RDW 12.7 02/05/2025    RDW 13.0 01/11/2020     02/05/2025     01/11/2020     BMP RESULTS:  Lab Results   Component Value Date     01/31/2025     01/11/2020    POTASSIUM 4.1 01/31/2025    POTASSIUM 4.9 01/11/2020    CHLORIDE 105 01/31/2025    CHLORIDE 105 01/11/2020    CO2 26 01/31/2025    CO2 31 01/11/2020    ANIONGAP 9 01/31/2025    ANIONGAP 1 (L) 01/11/2020     (H) 01/31/2025     (H) 01/31/2025     (H) 01/11/2020    BUN 27.3 (H) 01/31/2025    BUN 18 01/11/2020    CR 1.0 05/05/2025    CR 0.84 01/31/2025    CR 0.81 01/04/2021    GFRESTIMATED >60 05/05/2025    GFRESTIMATED >60 01/04/2021    GFRESTBLACK >90 01/11/2020    ISMAEL 8.5 (L) 01/31/2025    ISMAEL 8.9 01/11/2020      A1C RESULTS:  Lab Results   Component Value Date    A1C 6.2 (H) 02/05/2025    A1C 5.9 (H) 01/04/2021     INR RESULTS:  No results  "found for: \"INR\"    Recent Results (from the past 4320 hours)   Echocardiogram Complete   Result Value    LVEF  55%    Narrative    [id number]  [id number]  [id number]  [id number]^NARCISA^FUENTES^WERNER     [facility]  Echocardiography Laboratory  [address]  [place] [address]     Name: MACIEL HAYWOOD  MRN: [id number]  : 1965  Study Date: 2025 08:55 AM  Age: 60 yrs  Gender: Male  Patient Location: [facility]  Reason For Study: LBBB (left bundle branch block)  Ordering Physician: DANIE Hamilton  Referring Physician: Dariana Ontiveros  Performed By: Rupa Calero RDCS     BSA: 2.1 m2  Height: 71 in  Weight: 188 lb  HR: 56  BP: 156/82 mmHg  ______________________________________________________________________________  Procedure  Echocardiogram with two-dimensional, color and spectral Doppler. Adequate  quality two-dimensional was performed and interpreted.  ______________________________________________________________________________  Interpretation Summary     1. Normal left ventricular size and systolic function. Estimated ejection  fraction is 55%.  2. Normal right ventricular size and systolic function.  3. No significant valve disease.  4. No significant change compared to prior exam from 2023.  ______________________________________________________________________________  Left Ventricle  The left ventricle is normal in size. There is normal left ventricular wall  thickness. Left ventricular systolic function is normal. The visual ejection  fraction is estimated at 55%. Left ventricular diastolic function is  indeterminate. No regional wall motion abnormalities noted.     Right Ventricle  The right ventricle is normal in size and function.     Atria  Normal left atrial size. Right atrial size is normal.     Mitral Valve  Mitral valve bowing without prolapse. Trivial mitral regurgitation.     Tricuspid Valve  The tricuspid valve is not well visualized, but is grossly " normal. There is  mild (1+) tricuspid regurgitation.     Aortic Valve  The aortic valve is trileaflet. No aortic regurgitation is present. No aortic  stenosis is present.     Pulmonic Valve  The pulmonic valve is not well visualized. There is mild (1+) pulmonic  valvular regurgitation. Normal pulmonic valve velocity.     Vessels  The aortic root is normal size. Normal size ascending aorta. Dilation of the  inferior vena cava is present with normal respiratory variation in diameter.     Pericardium  There is no pericardial effusion.     Rhythm  Sinus rhythm was noted.  ______________________________________________________________________________  MMode/2D Measurements & Calculations  IVSd: 0.87 cm     LVIDd: 4.8 cm  LVIDs: 3.4 cm  LVPWd: 1.1 cm  FS: 29.3 %  LV mass(C)d: 164.5 grams  LV mass(C)dI: 80.1 grams/m2  Ao root diam: 2.9 cm  LA dimension: 3.4 cm  asc Aorta Diam: 3.2 cm  LA/Ao: 1.2  Ao root diam index Ht(cm/m): 1.6  Ao root diam index BSA (cm/m2): 1.4  Asc Ao diam index BSA (cm/m2): 1.6  Asc Ao diam index Ht(cm/m): 1.8  LA Volume (BP): 51.4 ml     LA Volume Index (BP): 25.1 ml/m2  RV Base: 4.3 cm  RWT: 0.45  TAPSE: 2.9 cm     Doppler Measurements & Calculations  MV E max santos: 73.6 cm/sec  MV A max santos: 56.2 cm/sec  MV E/A: 1.3  MV dec slope: 302.8 cm/sec2  MV dec time: 0.24 sec  PA acc time: 0.15 sec  E/E' avg: 10.1  Lateral E/e': 7.0  Medial E/e': 13.2  RV S Santos: 12.6 cm/sec     ______________________________________________________________________________  Report approved by: Heena Rich MD on 06/09/2025 04:13 PM

## 2025-06-12 ENCOUNTER — OFFICE VISIT (OUTPATIENT)
Dept: CARDIOLOGY | Facility: CLINIC | Age: 60
End: 2025-06-12
Payer: COMMERCIAL

## 2025-06-12 VITALS
RESPIRATION RATE: 16 BRPM | OXYGEN SATURATION: 98 % | SYSTOLIC BLOOD PRESSURE: 114 MMHG | HEART RATE: 58 BPM | BODY MASS INDEX: 25.65 KG/M2 | HEIGHT: 71 IN | WEIGHT: 183.2 LBS | DIASTOLIC BLOOD PRESSURE: 66 MMHG

## 2025-06-12 DIAGNOSIS — I44.7 LBBB (LEFT BUNDLE BRANCH BLOCK): Primary | ICD-10-CM

## 2025-06-12 DIAGNOSIS — R06.09 DYSPNEA ON EXERTION: ICD-10-CM

## 2025-06-12 DIAGNOSIS — E10.3293 TYPE 1 DIABETES MELLITUS WITH MILD NONPROLIFERATIVE RETINOPATHY OF BOTH EYES WITHOUT MACULAR EDEMA (H): Chronic | ICD-10-CM

## 2025-06-12 DIAGNOSIS — Z82.49 FAMILY HISTORY OF ISCHEMIC HEART DISEASE: ICD-10-CM

## 2025-06-12 NOTE — LETTER
6/12/2025    Juan Allen, DO  [address] [address]    RE: Rangel Singh       Dear Colleague,     I had the pleasure of seeing Rangel Singh in the [facility].              ~Cardiology Clinic Visit~    Primary Cardiologist: Dr. Hamilton  Reason for visit: testing follow up    History of Present Illness    Rangel Singh is a very pleasant 60 year old male with a past medical history notable for chronic left bundle branch block, type 1 diabetes mellitus, hyperlipidemia, thrombocytopenia and leukopenia after tick bite, since improved.  Pt carries family history of CAD with CHF, MI/PCI in first degree relatives.    In brief, patient presented to cardiology clinic in on exertion.  He has a history of left bundle branch block that has been present for several years.  With his diabetes he has been on low-dose term insulin.  He has had a prior CT scan which demonstrated coronary calcifications.  This was not clarified beyond that.  From a symptom standpoint, he noticed that over the last 2 years he has had some dyspnea on exertion that has been progressive.  In the past he has been able to run marathons, and now after just a few miles of exertion he starts to feel short of breath.  He endorses no significant changes in his weight or any other significant factors that could be contributing to this.  Based on these new symptoms, he was concerned and therefore elected to have this cardiology evaluation.  He denies any erick chest pain.    An echocardiogram from 2023 showed EF 55-60% with mildly dilated RV and normal RV function.  Normal IVC.    Based on his symptoms and history, he was recommended update echocardiogram, Lexiscan stress test to exclude the possibility of significant as a possible cause of symptoms    Diagnostics:  6/9/2025 echocardiogram: EF 55%, no wall motion abnormalities.  Normal RV.  No hemodynamically significant valvular disease.  Normal aortic  dimensions.  No significant change from prior study.    6/9/2025 Lexiscan stress test: Study was negative for inducible myocardial ischemia or infarct.  Small mild in the anterior segment is better at stress versus rest which likely represents artifact.  At rest 55%, at stress 50%.  EKG portion of the exam was negative for inducible ischemic EKG changes.  There were occasional PACs during stress.    Testing reviewed, today.  Patient doing well.  No CP, HF symptoms, edema, palpitations, SOB at rest.  __________________________________________________________________         Assessment and Impression:     Dyspnea on exertion  Chronic left bundle-branch block  Diabetes mellitus type 1, insulin-dependent  Hyperlipidemia, history of thrombocytopenia and leukopenia, resolved         Recommendations and Plan:     No changes today from a cardiac standpoint.  Reassuring testing.  Ongoing healthy lifestyle - daily activity, mediterranean style diet.  Routine follow up in 1-year.  __________________________________________________________________    Thank you for the opportunity to participate in this pleasant patient's care.    We would be happy to see this patient sooner for any concerns in the meantime.    Annabelle Tejada, LEXI, CNP, [facility]   Nurse Practitioner  [facility]    Today's clinic visit entailed:  The following tests were independently interpreted by me as noted in my documentation: labs, stress test, echo  Prescription drug management  The level of medical decision making during this visit was of moderate complexity.  Review of the result(s) of each unique test - cardiac testing, cardiac imaging, labs  Care everywhere reviewed for additional records to facilitate comprehensive patient care.  Recent hospitalization records and notes reviewed to facilitate comprehensive care coordination.    Orders this Visit:  Orders Placed This Encounter   Procedures     Follow-Up with Cardiology- LUIS     No  "orders of the defined types were placed in this encounter.    There are no discontinued medications.  Encounter Diagnoses   Name Primary?     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  Dispense 3 month supply. Accu-Check Compact Plus. 510 each 11     Cholecalciferol (VITAMIN D) 50 MCG (2000 UT) CAPS        ezetimibe (ZETIA) 10 MG tablet Take 1 tablet by mouth every morning       glucagon (GLUCAGON EMERGENCY) 1 MG kit Inject 1 mg into the muscle once for 1 dose 1 mg 11     insulin glargine (LANTUS VIAL) 100 UNIT/ML vial INJECT 2 TO 5 UNITS SUBCUTANEOUSLY AT BEDTIME 10 mL 11     insulin lispro (HUMALOG) 100 UNIT/ML vial INJECT 2-3 UNITS IN THE MORNING, 2-3 AT LUNCH, AND 2-4 IN THE EVENING AND SLIDING SCALE. ESTIMATED MAXIMUM UNITS DAILY IS 25 UNITS PER DAY 10 mL 11     insulin syringe-needle U-100 (BD VEO INSULIN SYRINGE U/F) 31G X 15/64\" 0.3 ML Use 4 syringes daily or as directed. 120 each 11     insulin syringes, disposable, U-100 0.3 ML MISC 1 Device 4 times daily 100 each 11     rosuvastatin (CRESTOR) 40 MG tablet Take 1 tablet (40 mg) by mouth daily. Profile 90 tablet 3     tamsulosin (FLOMAX) 0.4 MG capsule Take 1 capsule (0.4 mg) by mouth daily. 90 capsule 3     TRUEPLUS LANCETS 33G MISC 1 Device 4 times daily 100 each 11     ALLERGIES     Allergies   Allergen Reactions     Nkda [No Known Drug Allergy]      PAST MEDICAL, SURGICAL, FAMILY, SOCIAL HISTORY:  History was reviewed and updated as needed, see medical record.    Review of Systems:  A 10-point Review Of Systems is otherwise normal except for that which is noted in the HPI and interval summary.    Physical Exam:    Vitals: BP " "114/66   Pulse 58   Resp 16   Ht 1.803 m (5' 11\")   Wt 83.1 kg (183 lb 3.2 oz)   SpO2 98%   BMI 25.55 kg/m    Constitutional: Appears stated age, well nourished, NAD.  Respiratory: Non-labored. Lungs clear  Cardiovascular: RRR, normal S1 and S2. No murmur.  No edema.  GI: Soft, non-distended, non-tender.  Skin: Warm and dry.   Musculoskeletal/Extremities: Symmetrical movement.  Neurologic: No gross focal deficits. Alert, awake.  Psychiatric: Affect appropriate. Mentation normal.    Recent Lab Results:  LIPID RESULTS:  Lab Results   Component Value Date    CHOL 172 04/06/2023    CHOL 182 01/04/2021    HDL 53 04/06/2023    HDL 64 01/04/2021     (H) 04/06/2023     01/04/2021    TRIG 42 04/06/2023    TRIG 39 01/05/2023    TRIG 39 01/04/2021    CHOLHDLRATIO 2.6 10/24/2015     LIVER ENZYME RESULTS:  Lab Results   Component Value Date    AST 39 01/31/2025    AST 25 01/11/2020    ALT 38 01/31/2025    ALT 31 01/11/2020     CBC RESULTS:  Lab Results   Component Value Date    WBC 4.0 02/05/2025    WBC 3.1 (L) 01/11/2020    RBC 4.49 02/05/2025    RBC 4.67 01/11/2020    HGB 13.5 02/05/2025    HGB 14.4 01/11/2020    HCT 41.0 02/05/2025    HCT 45.0 01/11/2020    MCV 91 02/05/2025    MCV 96 01/11/2020    MCH 30.1 02/05/2025    MCH 30.8 01/11/2020    MCHC 32.9 02/05/2025    MCHC 32.0 01/11/2020    RDW 12.7 02/05/2025    RDW 13.0 01/11/2020     02/05/2025     01/11/2020     BMP RESULTS:  Lab Results   Component Value Date     01/31/2025     01/11/2020    POTASSIUM 4.1 01/31/2025    POTASSIUM 4.9 01/11/2020    CHLORIDE 105 01/31/2025    CHLORIDE 105 01/11/2020    CO2 26 01/31/2025    CO2 31 01/11/2020    ANIONGAP 9 01/31/2025    ANIONGAP 1 (L) 01/11/2020     (H) 01/31/2025     (H) 01/31/2025     (H) 01/11/2020    BUN 27.3 (H) 01/31/2025    BUN 18 01/11/2020    CR 1.0 05/05/2025    CR 0.84 01/31/2025    CR 0.81 01/04/2021    GFRESTIMATED >60 05/05/2025    GFRESTIMATED " ">60 2021    GFRESTBLACK >90 2020    ISMAEL 8.5 (L) 2025    ISMAEL 8.9 2020      A1C RESULTS:  Lab Results   Component Value Date    A1C 6.2 (H) 2025    A1C 5.9 (H) 2021     INR RESULTS:  No results found for: \"INR\"    Recent Results (from the past 4320 hours)   Echocardiogram Complete   Result Value    LVEF  55%    Narrative    [id number]  [id number]  [id number]  [id number]^NARCISA^FUENTES^WERNER     [facility]  Echocardiography Laboratory  [address] [place] [address]     Name: MACIEL HAYWOOD  MRN: [id number]  : 1965  Study Date: 2025 08:55 AM  Age: 60 yrs  Gender: Male  Patient Location: [facility]  Reason For Study: LBBB (left bundle branch block)  Ordering Physician: DANIE Hamilton  Referring Physician: Dariana Ontiveros  Performed By: Rupa Calero RDCS     BSA: 2.1 m2  Height: 71 in  Weight: 188 lb  HR: 56  BP: 156/82 mmHg  ______________________________________________________________________________  Procedure  Echocardiogram with two-dimensional, color and spectral Doppler. Adequate  quality two-dimensional was performed and interpreted.  ______________________________________________________________________________  Interpretation Summary     1. Normal left ventricular size and systolic function. Estimated ejection  fraction is 55%.  2. Normal right ventricular size and systolic function.  3. No significant valve disease.  4. No significant change compared to prior exam from 2023.  ______________________________________________________________________________  Left Ventricle  The left ventricle is normal in size. There is normal left ventricular wall  thickness. Left ventricular systolic function is normal. The visual ejection  fraction is estimated at 55%. Left ventricular diastolic function is  indeterminate. No regional wall motion abnormalities noted.     Right Ventricle  The right ventricle is normal in size and function.   "   Atria  Normal left atrial size. Right atrial size is normal.     Mitral Valve  Mitral valve bowing without prolapse. Trivial mitral regurgitation.     Tricuspid Valve  The tricuspid valve is not well visualized, but is grossly normal. There is  mild (1+) tricuspid regurgitation.     Aortic Valve  The aortic valve is trileaflet. No aortic regurgitation is present. No aortic  stenosis is present.     Pulmonic Valve  The pulmonic valve is not well visualized. There is mild (1+) pulmonic  valvular regurgitation. Normal pulmonic valve velocity.     Vessels  The aortic root is normal size. Normal size ascending aorta. Dilation of the  inferior vena cava is present with normal respiratory variation in diameter.     Pericardium  There is no pericardial effusion.     Rhythm  Sinus rhythm was noted.  ______________________________________________________________________________  MMode/2D Measurements & Calculations  IVSd: 0.87 cm     LVIDd: 4.8 cm  LVIDs: 3.4 cm  LVPWd: 1.1 cm  FS: 29.3 %  LV mass(C)d: 164.5 grams  LV mass(C)dI: 80.1 grams/m2  Ao root diam: 2.9 cm  LA dimension: 3.4 cm  asc Aorta Diam: 3.2 cm  LA/Ao: 1.2  Ao root diam index Ht(cm/m): 1.6  Ao root diam index BSA (cm/m2): 1.4  Asc Ao diam index BSA (cm/m2): 1.6  Asc Ao diam index Ht(cm/m): 1.8  LA Volume (BP): 51.4 ml     LA Volume Index (BP): 25.1 ml/m2  RV Base: 4.3 cm  RWT: 0.45  TAPSE: 2.9 cm     Doppler Measurements & Calculations  MV E max santos: 73.6 cm/sec  MV A max santos: 56.2 cm/sec  MV E/A: 1.3  MV dec slope: 302.8 cm/sec2  MV dec time: 0.24 sec  PA acc time: 0.15 sec  E/E' avg: 10.1  Lateral E/e': 7.0  Medial E/e': 13.2  RV S Santos: 12.6 cm/sec     ______________________________________________________________________________  Report approved by: Heena Rich MD on 06/09/2025 04:13 PM                              Thank you for allowing me to participate in the care of your patient.      Sincerely,     LEXI Hernández [facility]  [facility]  cc:   Janice Hamilton MD  [address]  [place] [id number]

## 2025-08-16 ENCOUNTER — HEALTH MAINTENANCE LETTER (OUTPATIENT)
Age: 60
End: 2025-08-16

## (undated) DEVICE — DRSG KERLIX FLUFFS X5

## (undated) DEVICE — SUCTION TIP YANKAUER STR K87

## (undated) DEVICE — GLOVE BIOGEL PI MICRO INDICATOR UNDERGLOVE SZ 8.0 48980

## (undated) DEVICE — TUBING IV EXTENSION SET 60" HI FLOW 2N3349

## (undated) DEVICE — CLOSURE SYS SKIN PREMIERPRO EXOFINFUSION 4X60CM 3473

## (undated) DEVICE — GLOVE BIOGEL PI MICRO SZ 8.0 48580

## (undated) DEVICE — SOL ISOPROPYL RUBBING ALCOHOL USP 70% 4OZ HDX-20 I0020

## (undated) DEVICE — DRAPE MICROSCOPE MICRO-KOVER LEICA 48"X120" 09-MK651

## (undated) DEVICE — Device

## (undated) DEVICE — TOOL DISSECT MIDAS MR8 14CM MATCH HEAD 3MM MR8-14MH30

## (undated) DEVICE — PREP CHLORAPREP 26ML TINTED HI-LITE ORANGE 930815

## (undated) DEVICE — SU VICRYL 2-0 CT-2 8X18" UND D8144

## (undated) DEVICE — SUCTION MANIFOLD NEPTUNE 2 SYS 4 PORT 0702-020-000

## (undated) DEVICE — LINEN BACK PACK 5440

## (undated) DEVICE — SYR 10ML FINGER CONTROL W/O NDL 309695

## (undated) DEVICE — SU VICRYL 1 CT-1 CR 8X18" J741D

## (undated) DEVICE — SYR 30ML LL W/O NDL 302832

## (undated) DEVICE — SOL NACL 0.9% IRRIG 1000ML BOTTLE 2F7124

## (undated) DEVICE — SURGIFLO

## (undated) DEVICE — DRAPE C-ARM W/STRAPS 42X72" 07-CA104

## (undated) DEVICE — POSITIONER ARMBOARD FOAM 1PAIR LF FP-ARMB1

## (undated) DEVICE — SUCTION MINISQUAIR SMOKE EVAC CAPTURE DEVICE SQ20012-01

## (undated) DEVICE — NDL INSUFFLATION 14GA STEP S100000

## (undated) DEVICE — DRSG AQUACEL AG HYDROFIBER 3.5X6" 422604

## (undated) DEVICE — SYR 50ML LL W/O NDL 309653

## (undated) DEVICE — SU MONOCRYL 3-0 PS-2 27" Y427H

## (undated) DEVICE — SOL WATER IRRIG 1000ML BOTTLE 2F7114

## (undated) DEVICE — BLADE CLIPPER SGL USE 9680

## (undated) RX ORDER — ALBUTEROL SULFATE 0.83 MG/ML
SOLUTION RESPIRATORY (INHALATION)
Status: DISPENSED
Start: 2023-02-17

## (undated) RX ORDER — EPHEDRINE SULFATE 50 MG/ML
INJECTION, SOLUTION INTRAMUSCULAR; INTRAVENOUS; SUBCUTANEOUS
Status: DISPENSED
Start: 2023-02-17

## (undated) RX ORDER — PROPOFOL 10 MG/ML
INJECTION, EMULSION INTRAVENOUS
Status: DISPENSED
Start: 2023-02-17

## (undated) RX ORDER — ONDANSETRON 2 MG/ML
INJECTION INTRAMUSCULAR; INTRAVENOUS
Status: DISPENSED
Start: 2023-02-17

## (undated) RX ORDER — FENTANYL CITRATE-0.9 % NACL/PF 10 MCG/ML
PLASTIC BAG, INJECTION (ML) INTRAVENOUS
Status: DISPENSED
Start: 2023-02-17

## (undated) RX ORDER — BUPIVACAINE HYDROCHLORIDE AND EPINEPHRINE 5; 5 MG/ML; UG/ML
INJECTION, SOLUTION EPIDURAL; INTRACAUDAL; PERINEURAL
Status: DISPENSED
Start: 2023-02-17

## (undated) RX ORDER — CELECOXIB 200 MG/1
CAPSULE ORAL
Status: DISPENSED
Start: 2023-02-17

## (undated) RX ORDER — FENTANYL CITRATE 50 UG/ML
INJECTION, SOLUTION INTRAMUSCULAR; INTRAVENOUS
Status: DISPENSED
Start: 2023-02-17

## (undated) RX ORDER — HYDROMORPHONE HYDROCHLORIDE 1 MG/ML
INJECTION, SOLUTION INTRAMUSCULAR; INTRAVENOUS; SUBCUTANEOUS
Status: DISPENSED
Start: 2023-02-17

## (undated) RX ORDER — FENTANYL CITRATE 0.05 MG/ML
INJECTION, SOLUTION INTRAMUSCULAR; INTRAVENOUS
Status: DISPENSED
Start: 2023-02-17

## (undated) RX ORDER — CEFAZOLIN SODIUM/WATER 2 G/20 ML
SYRINGE (ML) INTRAVENOUS
Status: DISPENSED
Start: 2023-02-17

## (undated) RX ORDER — REGADENOSON 0.08 MG/ML
INJECTION, SOLUTION INTRAVENOUS
Status: DISPENSED
Start: 2025-06-09

## (undated) RX ORDER — DEXAMETHASONE SODIUM PHOSPHATE 4 MG/ML
INJECTION, SOLUTION INTRA-ARTICULAR; INTRALESIONAL; INTRAMUSCULAR; INTRAVENOUS; SOFT TISSUE
Status: DISPENSED
Start: 2023-02-17

## (undated) RX ORDER — VANCOMYCIN HYDROCHLORIDE 1 G/20ML
INJECTION, POWDER, LYOPHILIZED, FOR SOLUTION INTRAVENOUS
Status: DISPENSED
Start: 2023-02-17

## (undated) RX ORDER — OXYCODONE HYDROCHLORIDE 5 MG/1
TABLET ORAL
Status: DISPENSED
Start: 2023-02-17

## (undated) RX ORDER — ACETAMINOPHEN 325 MG/1
TABLET ORAL
Status: DISPENSED
Start: 2023-02-17